# Patient Record
Sex: MALE | Race: WHITE | NOT HISPANIC OR LATINO | Employment: OTHER | URBAN - METROPOLITAN AREA
[De-identification: names, ages, dates, MRNs, and addresses within clinical notes are randomized per-mention and may not be internally consistent; named-entity substitution may affect disease eponyms.]

---

## 2017-09-06 ENCOUNTER — HOSPITAL ENCOUNTER (EMERGENCY)
Facility: HOSPITAL | Age: 62
Discharge: HOME | End: 2017-09-07
Attending: EMERGENCY MEDICINE
Payer: COMMERCIAL

## 2017-09-06 DIAGNOSIS — R52 PAIN: ICD-10-CM
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
## 2017-09-06 DIAGNOSIS — L03.115 CELLULITIS OF RIGHT LOWER LEG: ICD-10-CM

## 2017-09-06 DIAGNOSIS — T14.8XXA SKIN ABRASION: ICD-10-CM
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
## 2017-09-06 DIAGNOSIS — S89.91XA RIGHT LEG INJURY: ICD-10-CM
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
## 2017-09-06 DIAGNOSIS — S82.831A DISPLACED FRACTURE OF DISTAL END OF RIGHT FIBULA: Primary | ICD-10-CM

## 2017-09-06 PROCEDURE — 99284 EMERGENCY DEPT VISIT MOD MDM: CPT | Mod: 25

## 2017-09-06 PROCEDURE — 99284 EMERGENCY DEPT VISIT MOD MDM: CPT | Mod: ,,, | Performed by: EMERGENCY MEDICINE

## 2017-09-06 PROCEDURE — 27818 TREATMENT OF ANKLE FRACTURE: CPT

## 2017-09-07 ENCOUNTER — TELEPHONE (OUTPATIENT)
Dept: ORTHOPEDICS | Facility: CLINIC | Age: 62
End: 2017-09-07

## 2017-09-07 VITALS
BODY MASS INDEX: 27.4 KG/M2 | OXYGEN SATURATION: 96 % | HEART RATE: 69 BPM | HEIGHT: 69 IN | DIASTOLIC BLOOD PRESSURE: 91 MMHG | SYSTOLIC BLOOD PRESSURE: 151 MMHG | TEMPERATURE: 98 F | WEIGHT: 185 LBS | RESPIRATION RATE: 18 BRPM

## 2017-09-07 DIAGNOSIS — S82.851A TRIMALLEOLAR FRACTURE, RIGHT, CLOSED, INITIAL ENCOUNTER: Primary | ICD-10-CM

## 2017-09-07 PROCEDURE — 27788 TREATMENT OF ANKLE FRACTURE: CPT

## 2017-09-07 PROCEDURE — 63600175 PHARM REV CODE 636 W HCPCS: Performed by: EMERGENCY MEDICINE

## 2017-09-07 PROCEDURE — 25000003 PHARM REV CODE 250: Performed by: EMERGENCY MEDICINE

## 2017-09-07 PROCEDURE — 90471 IMMUNIZATION ADMIN: CPT | Performed by: EMERGENCY MEDICINE

## 2017-09-07 PROCEDURE — 90715 TDAP VACCINE 7 YRS/> IM: CPT | Performed by: EMERGENCY MEDICINE

## 2017-09-07 RX ORDER — LIDOCAINE HYDROCHLORIDE 10 MG/ML
5 INJECTION, SOLUTION EPIDURAL; INFILTRATION; INTRACAUDAL; PERINEURAL
Status: COMPLETED | OUTPATIENT
Start: 2017-09-07 | End: 2017-09-07

## 2017-09-07 RX ORDER — SULFAMETHOXAZOLE AND TRIMETHOPRIM 800; 160 MG/1; MG/1
1 TABLET ORAL
Status: COMPLETED | OUTPATIENT
Start: 2017-09-07 | End: 2017-09-07

## 2017-09-07 RX ORDER — SULFAMETHOXAZOLE AND TRIMETHOPRIM 800; 160 MG/1; MG/1
1 TABLET ORAL 2 TIMES DAILY
Qty: 14 TABLET | Refills: 0 | Status: ON HOLD | OUTPATIENT
Start: 2017-09-07 | End: 2017-09-13

## 2017-09-07 RX ADMIN — LIDOCAINE HYDROCHLORIDE 50 MG: 10 INJECTION, SOLUTION EPIDURAL; INFILTRATION; INTRACAUDAL; PERINEURAL at 12:09

## 2017-09-07 RX ADMIN — SULFAMETHOXAZOLE AND TRIMETHOPRIM 1 TABLET: 800; 160 TABLET ORAL at 03:09

## 2017-09-07 RX ADMIN — CLOSTRIDIUM TETANI TOXOID ANTIGEN (FORMALDEHYDE INACTIVATED), CORYNEBACTERIUM DIPHTHERIAE TOXOID ANTIGEN (FORMALDEHYDE INACTIVATED), BORDETELLA PERTUSSIS TOXOID ANTIGEN (GLUTARALDEHYDE INACTIVATED), BORDETELLA PERTUSSIS FILAMENTOUS HEMAGGLUTININ ANTIGEN (FORMALDEHYDE INACTIVATED), BORDETELLA PERTUSSIS PERTACTIN ANTIGEN, AND BORDETELLA PERTUSSIS FIMBRIAE 2/3 ANTIGEN 0.5 ML: 5; 2; 2.5; 5; 3; 5 INJECTION, SUSPENSION INTRAMUSCULAR at 03:09

## 2017-09-07 NOTE — ED NOTES
Pt stable in room, no co pain at this time. Sitting in wheelchair until MD comes in to assess for increased comfort

## 2017-09-07 NOTE — TELEPHONE ENCOUNTER
"----- Message from Patricia Armendariz LPN sent at 9/7/2017  8:16 AM CDT -----  Contact: Wife(María Elena)- [phone]  [place] -    Dr. Varner saw pt in ED for R ankle fracture with reduction under fluoro and applied splint. Injury was sustained approx. 6 days ago,and pt was treated, initially, at [facility]. Please check with  for Ortho follow up and/or surgery.       Thank you,  Patricia   [phone]  ----- Message -----  From: Joseph Atwood  Sent: 9/6/2017   4:52 PM  To: [facility] Ortho Triage    Pt's wife would like pt to be seen tomorrow, pt fell 6ft from ladder a couple of days ago on his left foot. María Elena claims he, "fell straight." Pt went to [facility] and has gone through various x-rays. Pt's left leg/fractured tibia damaged. Only left foot came out unscathed. María Elena works at [employer], and can be reached at [phone].    "

## 2017-09-07 NOTE — CONSULTS
"Consult Note  Orthopedic Surgery      SUBJECTIVE:     History of Present Illness:  Anthony Hanna is a 61 y.o. male who presents with right ankle fracture. Patient fell "about 6 feet" from ladder 6 days ago. Seen by outside ER and placed in posterior slab without stirrups splint. Had follow-up with outside orthopedist and was scheduled for surgery sometime this week, now surgery has been moved tentatively for next week. Patient presents today for possible follow-up here and surgery. Denies numbness or tingling to extremities. Denies head injury, or loss of consciousness. Denies other MSK pains. Did sustain a laceration to anteromedial midshaft shin and skin laceration to skin over posterior calcaneal tuberosity. On 81 mg aspirin, otherwise denies other anticoagulants.      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    Family History   Problem Relation Age of Onset    No Known Problems Mother     No Known Problems Father     No Known Problems Sister     No Known Problems Brother     No Known Problems Maternal Aunt     No Known Problems Maternal Uncle     No Known Problems Paternal Aunt     No Known Problems Paternal Uncle     No Known Problems Maternal Grandmother     No Known Problems Maternal Grandfather     No Known Problems Paternal Grandmother     No Known Problems Paternal Grandfather     Amblyopia Neg Hx     Blindness Neg Hx     Cancer Neg Hx     Cataracts Neg Hx     Diabetes Neg Hx     Glaucoma Neg Hx     Hypertension Neg Hx     Macular degeneration Neg Hx     Retinal detachment Neg Hx     Strabismus Neg Hx     Stroke Neg Hx     Thyroid disease Neg Hx        Social History     Social History    Marital status:      Spouse name: N/A    Number of children: N/A    Years of education: N/A     Social History Main Topics    Smoking status: " "Former Smoker    Smokeless tobacco: None    Alcohol use Yes    Drug use: Unknown    Sexual activity: Not Asked     Other Topics Concern    None     Social History Narrative    None       Current Facility-Administered Medications   Medication Dose Route Frequency Provider Last Rate Last Dose    sulfamethoxazole-trimethoprim 800-160mg per tablet 1 tablet  1 tablet Oral ED 1 Time Magdy Guillen MD        Tdap vaccine injection 0.5 mL  0.5 mL Intramuscular ED 1 Time Magdy Guillen MD         Current Outpatient Prescriptions   Medication Sig Dispense Refill    citalopram (CELEXA) 40 MG tablet          Review of patient's allergies indicates:   Allergen Reactions    Pcn [penicillins] Anaphylaxis         Review of Systems:  ROS: Patient denies constitutional symptoms, cardiac symptoms, respiratory symptoms, GI symptoms. The remainder of the musculoskeletal ROS is included in the HPI.      OBJECTIVE:     Vital Signs (Most Recent)  Vitals:    09/06/17 1833 09/07/17 0118 09/07/17 0326 09/07/17 0327   BP: (!) 142/73 (!) 150/90  (!) 151/91   BP Location: Right arm      Patient Position: Sitting      Pulse: 81 69     Resp: 16 18     Temp: 98.4 °F (36.9 °C) 98.6 °F (37 °C) 98 °F (36.7 °C)    SpO2: 96%      Weight: 83.9 kg (185 lb)      Height: 5' 9" (1.753 m)            Physical Exam:  Constitutional:  NAD; well-developed and well-nourished  Pulmonary/Chest: Effort normal  Skin: Warm and dry  Neuro: Alert and oriented to person, place, and time; no focal numbness or weakness  RLE:  Moderate diffuse edema and swelling  Midshaft medial laceration about 1.5 inches in length and about 0.25 in width with dried intact scab mild 0.25 inch surrounding erythema without fluctuance  Breakdown in skin at posterior calcaneal tuberosity through skin about 0.25 inch height and 1 inch width (patient states occurred during fall)  Painless ROM of hip and knee  2+ DP  EHL, FHL, TA, GC intact    Diagnostic Results:  X-Ray: Reviewed right " [place] B fibular fracture, small posterior mal fracture, medial clear space widening on external rotation stress test    ASSESSMENT/PLAN:     61 y.o. male with functional trimalleolar right ankle fracture  - Reduction attempted and placed in short leg splint (see procedure note)  - Patient would like to discuss with wife about following-up here for surgery or current outside orthopedist  - Clinic will call this week to assess if patient would like to follow-up here  - Preoperative labs refused by patient (stated he already had them and will bring results)  - Tetanus updated in ER  - Given antibiotics for mild erythema around laceration to anteromedial midshaft laceration     Filiberto Varner MD PGY-2  Orthopedic Surgery    Procedure Note Right ankle reduction  Patient was explained risks, benefits, and alternatives to treatment and verbalized consent to proceed. Time out was performed and patient name, , site, and procedure were confirmed. 10 cc of 1% lidocaine was used for right fibular hematoma block. Ankle was examined under fluoro for external rotation stress test and was found to widen medially. Attempted reduction under fluoroscopy was attempted. Splint was applied in typical fashion. Post-reduction films were performed and confirmed adequate alignment

## 2017-09-07 NOTE — ED NOTES
Pt stable, cast applied by ortho to R lower leg. Received crutches, ambulates w/crutches. 4ps addressed, no s/s of distress.

## 2017-09-07 NOTE — ED NOTES
Patient identifiers verified and correct for Anthony Hanna.    LOC: The patient is awake, alert and aware of environment with an angry affect, the patient is oriented x 3 and speaking appropriately.  APPEARANCE: Patient has facial twitching, wearing glasses  patient is clean and well groomed, patient's clothing is properly fastened.  SKIN: The skin is warm and dry, color consistent with ethnicity,  bruise to top of left foot  MUSCULOSKELETAL: right leg in soft cast, good sensation in toes  RESPIRATORY: Airway is open and patent, respirations are spontaneous, patient has a normal effort and rate, no accessory muscle use noted,   CARDIAC: Patient has a normal rate and regular rhythm, no periphreal edema noted, capillary refill < 3 seconds.  ABDOMEN:  no distention noted  NEUROLOGIC: eyes open spontaneously, behavior appropriate to situation, follows commands, facial expression symmetrical, bilateral hand grasp equal and even, purposeful motor response noted

## 2017-09-07 NOTE — TELEPHONE ENCOUNTER
----- Message from Filiberto Varner MD sent at 9/7/2017  3:38 AM CDT -----  Please call patient on 9/7/17 to discuss follow-up with us and document answer (has already been seen by outside orthopedist and may follow-up with him). If patient would like to follow-up with us, I can schedule surgery date for sometime next week. Thank you.

## 2017-09-07 NOTE — TELEPHONE ENCOUNTER
Spoke with pt.   He does want to have surgery here at [facility] instead of the previously scheduled sx at [facility].  Discussed all pre and post op procedures.   Pt will contact surgeon and [facility] to cxl sx there.   Pending case request to be entered in Epic

## 2017-09-07 NOTE — ED PROVIDER NOTES
Encounter Date: 9/6/2017    SCRIBE #1 NOTE: I, Orly Omalley, am scribing for, and in the presence of,  Dr. Guillen. I have scribed the following portions of the note - the Resident attestation. Other sections scribed: X-ray, attending notes. .       History     Chief Complaint   Patient presents with    Leg Injury     fell 6ft from a ladder 5 days ago , landed on rt foot, seen at [facility]  - TIB ?FIB fx needs surgery.  Has a soft cast and crutches. Here for reeval.Wants to see ortho here.       62 yo M with Pmhx of nuclear sclerosis of bilateral eyes presents to the ED with requests to see an orthopedic surgeon for right tib-fib fracture sustained 5 days ago when he lost his balance and fell approximately 6 feet from a ladder.  Pt was seen at [facility] and was told he has a tib-fib fracture.  He was previously scheduled for surgical repair this week with Dr. Leiva, however the surgery needed to be pushed back until next week (9/12/17) due to scheduling issue. Pt is very upset about the rescheduling of his surgery and is wanting to see Orthopedic team here to have the surgery sooner.  Pt also complaining of some mild pain over the left foot between the first and second toes.  Pt states that x-rays of the left foot taken at [facility] were negative for fracture, but he does not trust the readings. Pt currently has a posterior splint in place and is using crutches to ambulate. Pt rates his pain 5/10 at this time and reports pain is worst over the lateral aspect of his right ankle.   Pt did not bring his x-ray readings, films, or paperwork from [facility].  Pt denies LOC or hitting his head during the fall.           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  Family History   Problem Relation Age of Onset    No Known Problems Mother     No Known Problems Father     No Known Problems Sister     No Known Problems Brother     No Known Problems Maternal Aunt     No Known Problems Maternal Uncle     No Known Problems Paternal Aunt     No Known Problems Paternal Uncle     No Known Problems Maternal Grandmother     No Known Problems Maternal Grandfather     No Known Problems Paternal Grandmother     No Known Problems Paternal Grandfather     Amblyopia Neg Hx     Blindness Neg Hx     Cancer Neg Hx     Cataracts Neg Hx     Diabetes Neg Hx     Glaucoma Neg Hx     Hypertension Neg Hx     Macular degeneration Neg Hx     Retinal detachment Neg Hx     Strabismus Neg Hx     Stroke Neg Hx     Thyroid disease Neg Hx      Social History   Substance Use Topics    Smoking status: Former Smoker    Smokeless tobacco: Not on file    Alcohol use Yes     Review of Systems   Constitutional: Negative for activity change and appetite change.   HENT: Negative for congestion and rhinorrhea.    Eyes: Negative for discharge and redness.   Respiratory: Negative for shortness of breath and wheezing.    Cardiovascular: Negative for chest pain and palpitations.   Gastrointestinal: Negative for abdominal distention and abdominal pain.   Musculoskeletal: Positive for gait problem and joint swelling.   Skin: Positive for wound (abrasion right lower leg). Negative for pallor.   Neurological: Negative for dizziness and syncope.       Physical Exam     Initial Vitals [09/06/17 1833]   BP Pulse Resp Temp SpO2   (!) 142/73 81 16 98.4 °F (36.9 °C) 96 %      MAP       96         Physical Exam    Constitutional: He appears well-developed and well-nourished. No distress.   HENT:   Head: Normocephalic and atraumatic.   Eyes: EOM are normal. Pupils are equal, round, and reactive to light.   Neck: Normal range of motion. Neck supple.   Cardiovascular: Normal rate, regular rhythm and  intact distal pulses.   Pulses:       Dorsalis pedis pulses are 2+ on the right side, and 2+ on the left side.   Pulmonary/Chest: Breath sounds normal. No respiratory distress. He has no wheezes.   Abdominal: Soft. Normal appearance.   Musculoskeletal:        Right ankle: He exhibits decreased range of motion (2/2 pain), swelling and ecchymosis. He exhibits no deformity, no laceration and normal pulse. Tenderness.   Tenderness to palpation of the distal 2nd metatarsal of left foot.        Neurological: No sensory deficit.   Skin: Capillary refill takes less than 2 seconds.   Superficial abrasion noted to the right shin and right heel. No active bleeding.           ED Course   Procedures  Labs Reviewed - No data to display       X-Rays:   Independently Interpreted Readings:   Other Readings:  Right ankle: mildly displaced distal fibula fracture with displaced posteriorly fibula.   Right foot: mildly displaced distal fibula fracture with displaced posteriorly fibula.   Right knee: mildly displaced distal fibula fracture with displaced posteriorly fibula.   Right tibia/fibula: mildly displaced distal fibula fracture with displaced posteriorly fibula.     Medical Decision Making:   History:   Old Medical Records: I decided to obtain old medical records.  Initial Assessment:   Pt with right tib-fib injury sustained 5 days ago when he fell from a ladder. Fracture diagnosed at OSH; pt does not have films or x-ray readings.                                                                       Differential Diagnosis:   - Fracture  - Contusion   - Sprain    Independently Interpreted Test(s):   I have ordered and independently interpreted X-rays - see prior notes.  Clinical Tests:   Radiological Study: Ordered and Reviewed  ED Management:  - Right tib/fib x-rays  - Right knee x-rays  - Right ankle x-rays  - Ortho Consult   Other:   I have discussed this case with another health care provider.       <> Summary of the  Discussion: Orthopedics.        APC / Resident Notes:   Pt with mildly displaced distal fibula fracture on x-ray. Neurovascularly intact on exam.   Discussed pt with Ortho. They will see the pt in the ED and attempt to reduce the fracture.     Pt with distal fibula fracture. Pain controlled at this time without medication.        Scribe Attestation:   Scribe #1: I performed the above scribed service and the documentation accurately describes the services I performed. I attest to the accuracy of the note.    Attending Attestation:   Physician Attestation Statement for Resident:  As the supervising MD   Physician Attestation Statement: I have personally seen and examined this patient.   I agree with the above history. -:   As the supervising MD I agree with the above PE.    As the supervising MD I agree with the above treatment, course, plan, and disposition.   -: Pt is neurovascularly intact distally. Orthopedics will be consulted to evaluate the pt.           Physician Attestation for Scribe:  Physician Attestation Statement for Scribe #1: I, Dr. Guillen, reviewed documentation, as scribed by Orly Omalley in my presence, and it is both accurate and complete.         Attending ED Notes:   12:03 AM  Orthopedics states they will come to evaluate the pt.     3:31 AM  The fracture has been reduced and splinted.  The patient remains neurovascularly intact distally.  Because of a small abrasion sustained when the patient injured himself, I will provide a tetanus update.  There is also a small amount of erythema surrounding the area.  Actual be provided.  Orthopedics states that they will call him tomorrow to schedule surgery and follow-up.  At this time, I believe the patient is clinically stable for discharge.          ED Course      Clinical Impression:   The primary encounter diagnosis was Displaced fracture of distal end of right fibula. Diagnoses of Right leg injury, Fall, Pain, Skin abrasion, and Cellulitis of right  lower leg were also pertinent to this visit.                           Sheyla Esquivel MD  Resident  09/07/17 8627       Magdy Guillen MD  09/20/17 6837

## 2017-09-07 NOTE — ED NOTES
Patient is here because he fell from a ladder 5 days ago, and has a right leg tib fib fracture, but the orthopedic surgeon Dr. Leiva says it couldn't be fixed until next week. Patient reports orthopedic surgeon has changed date of fracture repair 3 times and is frustrated. Patient wants surgery done sooner.  Patient states top of left foot is tender. Xray of left foot Sunday showed no fracture to left foot.

## 2017-09-08 ENCOUNTER — TELEPHONE (OUTPATIENT)
Dept: ORTHOPEDICS | Facility: CLINIC | Age: 62
End: 2017-09-08

## 2017-09-08 NOTE — TELEPHONE ENCOUNTER
----- Message from Irais Haq sent at 9/8/2017  8:38 AM CDT -----  Contact: self   Pt is requesting a call back regarding his upcoming surgery. [phone]

## 2017-09-08 NOTE — TELEPHONE ENCOUNTER
Spoke with pt.  Advised of appointment 9/12 at 8 am with YEISON Burt for skin check.  Pt verbalized understanding

## 2017-09-12 ENCOUNTER — TELEPHONE (OUTPATIENT)
Dept: ORTHOPEDICS | Facility: CLINIC | Age: 62
End: 2017-09-12

## 2017-09-12 ENCOUNTER — OFFICE VISIT (OUTPATIENT)
Dept: ORTHOPEDICS | Facility: CLINIC | Age: 62
End: 2017-09-12
Payer: COMMERCIAL

## 2017-09-12 ENCOUNTER — ANESTHESIA EVENT (OUTPATIENT)
Dept: SURGERY | Facility: HOSPITAL | Age: 62
End: 2017-09-12
Payer: COMMERCIAL

## 2017-09-12 DIAGNOSIS — S82.851A TRIMALLEOLAR FRACTURE OF RIGHT ANKLE, CLOSED, INITIAL ENCOUNTER: Primary | ICD-10-CM

## 2017-09-12 PROCEDURE — 99999 PR PBB SHADOW E&M-EST. PATIENT-LVL I: CPT | Mod: PBBFAC,,, | Performed by: PHYSICIAN ASSISTANT

## 2017-09-12 PROCEDURE — 3008F BODY MASS INDEX DOCD: CPT | Mod: S$GLB,,, | Performed by: PHYSICIAN ASSISTANT

## 2017-09-12 PROCEDURE — 99203 OFFICE O/P NEW LOW 30 MIN: CPT | Mod: S$GLB,,, | Performed by: PHYSICIAN ASSISTANT

## 2017-09-12 NOTE — H&P
Subjective:      Patient ID: Anthony Hanna is a 61 y.o. male.    Chief Complaint: No chief complaint on file.    Patient is a 61 year old male who presents to clinic for follow up from ED orthopedic consult for right trimalleolar ankle fracture that occurred on 09/07/2017 secondary to falling about 6 feet from a ladder on 09/01/2017.  Patient was first treated at an outside ED with posterior splint and stirup. He then followed up with an outside orthopedist. He decided that he would like to transfer his care to [facility]. He then came to [facility] ED where he was evaluated by orthopedics, reduced and splinted. He has been NWB. He is using crutches to assist with ambulation. Pain is controlled. He is taking pain medication.     Did sustain a laceration to anteromedial midshaft shin and skin laceration to skin over posterior calcaneal tuberosity. He was given antibiotics due to erythema in the area. He has taken as prescribed.  On 81 mg aspirin, stopped over past week, otherwise denies other anticoagulants. He deneid new or previous numbness or tingling.       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  Social History     Occupational History    Not on file.     Social History Main Topics    Smoking status: Former Smoker    Smokeless tobacco: Not on file    Alcohol use Yes    Drug use: Unknown    Sexual activity: Not on file      ROS  Current Outpatient Prescriptions on File Prior to Visit   Medication Sig Dispense Refill    citalopram (CELEXA) 40 MG tablet       sulfamethoxazole-trimethoprim 800-160mg (BACTRIM DS) 800-160 mg Tab Take 1 tablet by mouth 2 (two) times daily. 14 tablet 0     No current facility-administered medications on file prior to visit.      Review of patient's allergies indicates:   Allergen Reactions    Pcn [penicillins] Anaphylaxis          Objective:      There were no vitals filed for this visit.  Ortho Exam   Gen: WD, WN in NAD   HEENT: NC/AT   Heart: RR   Resp: unlabored breathing   Skin: splint in place      Assessment:       1. Trimalleolar fracture of right ankle, closed, initial encounter          Plan:       Surgical intervention per .

## 2017-09-12 NOTE — TELEPHONE ENCOUNTER
Spoke with pt.   Advised NPO after midnight.  Arrival time of 630 am tomorrow.  Pt to be iced and elevated while awaiting sx tomorrow, per Dr Garcia.   Pt verbalized understanding.

## 2017-09-12 NOTE — ANESTHESIA PREPROCEDURE EVALUATION
09/12/2017  [facility]  Anesthesia Pre-Operative Evaluation         Patient Name: Anthony Hanna  YOB: 1955  MRN: [id number]    SUBJECTIVE:     Pre-operative evaluation for Procedure(s) (LRB):  OPEN REDUCTION INTERNAL FIXATION-ANKLE - right - c-arm on left (Right)     09/12/2017    Anthony Hanna is a 61 y.o. male w/ a significant PMHx of nuclear sclerosis and right closed trimalleolar fracture who presents for the above procedure.    Prev airway: None documented.      Patient Active Problem List   Diagnosis    Nuclear sclerosis - Both Eyes    Trimalleolar fracture of right ankle       Review of patient's allergies indicates:   Allergen Reactions    Pcn [penicillins] Anaphylaxis       Current Inpatient Medications:      No current facility-administered medications on file prior to encounter.      Current Outpatient Prescriptions on File Prior to Encounter   Medication Sig Dispense Refill    citalopram (CELEXA) 40 MG tablet       sulfamethoxazole-trimethoprim 800-160mg (BACTRIM DS) 800-160 mg Tab Take 1 tablet by mouth 2 (two) times daily. 14 tablet 0       History reviewed. No pertinent surgical history.    Social History     Social History    Marital status:      Spouse name: N/A    Number of children: N/A    Years of education: N/A     Occupational History    Not on file.     Social History Main Topics    Smoking status: Former Smoker    Smokeless tobacco: Not on file    Alcohol use Yes    Drug use: Unknown    Sexual activity: Not on file     Other Topics Concern    Not on file     Social History Narrative    No narrative on file       OBJECTIVE:     Vital Signs Range (Last 24H):         CBC:   No results for input(s): WBC, RBC, HGB, HCT, PLT, MCV, MCH, MCHC in the last 72 hours.    CMP: No results for input(s): NA, K, CL, CO2, BUN, CREATININE, GLU,  MG, PHOS, CALCIUM, ALBUMIN, PROT, ALKPHOS, ALT, AST, BILITOT in the last 72 hours.    INR:  No results for input(s): INR, PROTIME, APTT in the last 72 hours.    Invalid input(s): PT    Diagnostic Studies: No relevant studies.    ASSESSMENT/PLAN:         Anesthesia Evaluation    I have reviewed the Patient Summary Reports.     I have reviewed the Medications.     Review of Systems  Anesthesia Hx:  History of prior surgery of interest to airway management or planning: Denies Family Hx of Anesthesia complications.   Denies Personal Hx of Anesthesia complications.   Social:  Smoker    Hematology/Oncology:  Hematology Normal   Oncology Normal     EENT/Dental:EENT/Dental Normal   Cardiovascular:   Exercise tolerance: good hyperlipidemia    Pulmonary:  Pulmonary Normal    Renal/:  Renal/ Normal     Hepatic/GI:  Hepatic/GI Normal    Musculoskeletal:   Right trimalleolar fx   Neurological:  Neurology Normal    Endocrine:  Endocrine Normal    Dermatological:  Skin Normal    Psych:   Psychiatric History          Physical Exam  General:  Well nourished    Airway/Jaw/Neck:  Airway Findings: Mouth Opening: Small, but > 3cm Tongue: Normal  General Airway Assessment: Adult, Average  Mallampati: III  Improves to II with phonation.  TM Distance: 4 - 6 cm        Eyes/Ears/Nose:  EYES/EARS/NOSE FINDINGS: Normal   Dental:  Dental Findings: In tact   Chest/Lungs:  Chest/Lungs Findings: Clear to auscultation, Normal Respiratory Rate     Heart/Vascular:  Heart Findings: Rate: Normal  Rhythm: Regular Rhythm  Sounds: Normal  Heart murmur: negative Vascular Findings: Normal    Abdomen:  Abdomen Findings: Normal    Musculoskeletal:  Musculoskeletal Findings: Normal   Skin:  Skin Findings: Normal    Mental Status:  Mental Status Findings:  Cooperative, Alert and Oriented         Anesthesia Plan  Type of Anesthesia, risks & benefits discussed:  Anesthesia Type:  MAC, general  Patient's Preference:   Intra-op Monitoring Plan: standard ASA  monitors  Intra-op Monitoring Plan Comments:   Post Op Pain Control Plan: multimodal analgesia  Post Op Pain Control Plan Comments:   Induction:   IV  Beta Blocker:  Patient is not currently on a Beta-Blocker (No further documentation required).       Informed Consent: Patient understands risks and agrees with Anesthesia plan.  Questions answered. Anesthesia consent signed with patient.  ASA Score: 2     Day of Surgery Review of History & Physical:            Ready For Surgery From Anesthesia Perspective.

## 2017-09-12 NOTE — PROGRESS NOTES
Subjective:      Patient ID: Anthony Hanna is a 61 y.o. male.    Chief Complaint: No chief complaint on file.    HPI    Patient is a 61 year old male who presents to clinic for follow up from ED orthopedic consult for right trimalleolar ankle fracture that occurred on 09/07/2017 secondary to falling about 6 feet from a ladder on 09/01/2017.  Patient was first treated at an outside ED with posterior splint and stirup. He then followed up with an outside orthopedist. He decided that he would like to transfer his care to [facility]. He then came to [facility] ED where he was evaluated by orthopedics, reduced and splinted. He has been NWB. He is using crutches to assist with ambulation. Pain is controlled. He is taking pain medication.     Did sustain a laceration to anteromedial midshaft shin and skin laceration to skin over posterior calcaneal tuberosity. He was given antibiotics due to erythema in the area. He has taken as prescribed.  On 81 mg aspirin, stopped over past week, otherwise denies other anticoagulants. He deneid new or previous numbness or tingling.     Review of Systems   Constitution: Negative for chills and fever.   Cardiovascular: Negative for chest pain.   Respiratory: Negative for cough and shortness of breath.    Skin: Negative for color change, dry skin, itching, nail changes, poor wound healing and rash.   Musculoskeletal:        Right ankle fracture.    Neurological: Negative for dizziness.   Psychiatric/Behavioral: Negative for altered mental status. The patient is not nervous/anxious.    All other systems reviewed and are negative.        Objective:      General    Constitutional: He is oriented to person, place, and time. He appears well-developed and well-nourished. No distress.   HENT:   Head: Atraumatic.   Eyes: Conjunctivae are normal.   Cardiovascular: Normal rate.    Pulmonary/Chest: Effort normal.   Neurological: He is alert and oriented to person, place, and time.    Psychiatric: He has a normal mood and affect. His behavior is normal.         Right Ankle/Foot Exam     Comments:  Arrived to clinic with splint in place, kept in place for exam due to reduction.   Skin does not wrinkle             Assessment:       Encounter Diagnosis   Name Primary?    Trimalleolar fracture of right ankle, closed, initial encounter Yes          Plan:       Discussed treatment options with patient. Surgical vs. Non-opertive treatment discussed. Recommend surgical fixation. Patient agreed,    - continue splint   - rest ice and elevation to reduce swelling  - NWB  -pain medication:refill needed,     - consents signed.   - labs, EKG and chest x-ray , done and sent to be scanned.   - he is not on anticoagulation therapy.     Pre, darwin, and post operative procedure and expectations were discussed.  Questions were answered. The patient has been educated and is ready to proceed with surgery.  Approximately 30 minutes was spent discussing surgical outcomes, plans, procedures, pre, darwin, and post operative expectations and care. The risks, benefits and alternatives to surgery were discussed with the patient at great length.  These include bleeding, infection, vessel/nerve damage, pain, numbness, tingling, complex regional pain syndrome, hardware/surgical failure, need for further surgery, malunion, nonunion, DVT, PE, arthritis and death.  Patient states an understanding and wishes to proceed with surgery.   All questions were answered.  No guarantees were implied or stated.  Informed consent was obtained  The patient will contact us if the have any questions, concerns, and changes in their medical condition prior to surgery.

## 2017-09-13 ENCOUNTER — SURGERY (OUTPATIENT)
Age: 62
End: 2017-09-13

## 2017-09-13 ENCOUNTER — ANESTHESIA (OUTPATIENT)
Dept: SURGERY | Facility: HOSPITAL | Age: 62
End: 2017-09-13
Payer: COMMERCIAL

## 2017-09-13 ENCOUNTER — HOSPITAL ENCOUNTER (OUTPATIENT)
Facility: HOSPITAL | Age: 62
Discharge: HOME | End: 2017-09-13
Attending: ORTHOPAEDIC SURGERY | Admitting: ORTHOPAEDIC SURGERY
Payer: COMMERCIAL

## 2017-09-13 VITALS
RESPIRATION RATE: 18 BRPM | SYSTOLIC BLOOD PRESSURE: 125 MMHG | HEART RATE: 74 BPM | DIASTOLIC BLOOD PRESSURE: 74 MMHG | OXYGEN SATURATION: 97 % | TEMPERATURE: 98 F

## 2017-09-13 DIAGNOSIS — S82.851A CLOSED FRACTURE OF ANKLE, TRIMALLEOLAR, RIGHT, INITIAL ENCOUNTER: ICD-10-CM

## 2017-09-13 PROBLEM — S82.841A CLOSED BIMALLEOLAR FRACTURE OF RIGHT ANKLE: Status: ACTIVE | Noted: 2017-09-13

## 2017-09-13 PROBLEM — S82.853A CLOSED FRACTURE OF ANKLE, TRIMALLEOLAR: Status: ACTIVE | Noted: 2017-09-13

## 2017-09-13 PROCEDURE — 27200750 HC INSULATED NEEDLE/ STIMUPLEX: Performed by: PAIN MEDICINE

## 2017-09-13 PROCEDURE — 37000008 HC ANESTHESIA 1ST 15 MINUTES: Performed by: ORTHOPAEDIC SURGERY

## 2017-09-13 PROCEDURE — S0077 INJECTION, CLINDAMYCIN PHOSP: HCPCS | Performed by: STUDENT IN AN ORGANIZED HEALTH CARE EDUCATION/TRAINING PROGRAM

## 2017-09-13 PROCEDURE — C1713 ANCHOR/SCREW BN/BN,TIS/BN: HCPCS | Performed by: ORTHOPAEDIC SURGERY

## 2017-09-13 PROCEDURE — 64445 NJX AA&/STRD SCIATIC NRV IMG: CPT | Mod: 59,RT,, | Performed by: ANESTHESIOLOGY

## 2017-09-13 PROCEDURE — 36000709 HC OR TIME LEV III EA ADD 15 MIN: Performed by: ORTHOPAEDIC SURGERY

## 2017-09-13 PROCEDURE — S0028 INJECTION, FAMOTIDINE, 20 MG: HCPCS | Performed by: NURSE ANESTHETIST, CERTIFIED REGISTERED

## 2017-09-13 PROCEDURE — 37000009 HC ANESTHESIA EA ADD 15 MINS: Performed by: ORTHOPAEDIC SURGERY

## 2017-09-13 PROCEDURE — 25000003 PHARM REV CODE 250: Performed by: STUDENT IN AN ORGANIZED HEALTH CARE EDUCATION/TRAINING PROGRAM

## 2017-09-13 PROCEDURE — 27201423 OPTIME MED/SURG SUP & DEVICES STERILE SUPPLY: Performed by: ORTHOPAEDIC SURGERY

## 2017-09-13 PROCEDURE — 71000033 HC RECOVERY, INTIAL HOUR: Performed by: ORTHOPAEDIC SURGERY

## 2017-09-13 PROCEDURE — 36000708 HC OR TIME LEV III 1ST 15 MIN: Performed by: ORTHOPAEDIC SURGERY

## 2017-09-13 PROCEDURE — C1769 GUIDE WIRE: HCPCS | Performed by: ORTHOPAEDIC SURGERY

## 2017-09-13 PROCEDURE — 27814 TREATMENT OF ANKLE FRACTURE: CPT | Mod: RT,,, | Performed by: ORTHOPAEDIC SURGERY

## 2017-09-13 PROCEDURE — 76942 ECHO GUIDE FOR BIOPSY: CPT | Mod: 26,,, | Performed by: ANESTHESIOLOGY

## 2017-09-13 PROCEDURE — 25000003 PHARM REV CODE 250: Performed by: ORTHOPAEDIC SURGERY

## 2017-09-13 PROCEDURE — 76942 ECHO GUIDE FOR BIOPSY: CPT | Performed by: ANESTHESIOLOGY

## 2017-09-13 PROCEDURE — 63600175 PHARM REV CODE 636 W HCPCS: Performed by: ANESTHESIOLOGY

## 2017-09-13 PROCEDURE — 63600175 PHARM REV CODE 636 W HCPCS: Performed by: NURSE ANESTHETIST, CERTIFIED REGISTERED

## 2017-09-13 PROCEDURE — 27000221 HC OXYGEN, UP TO 24 HOURS

## 2017-09-13 PROCEDURE — 64450 NJX AA&/STRD OTHER PN/BRANCH: CPT | Mod: 59,RT,, | Performed by: ANESTHESIOLOGY

## 2017-09-13 PROCEDURE — 25000003 PHARM REV CODE 250: Performed by: NURSE ANESTHETIST, CERTIFIED REGISTERED

## 2017-09-13 PROCEDURE — 71000015 HC POSTOP RECOV 1ST HR: Performed by: ORTHOPAEDIC SURGERY

## 2017-09-13 PROCEDURE — D9220A PRA ANESTHESIA: Mod: ,,, | Performed by: ANESTHESIOLOGY

## 2017-09-13 PROCEDURE — 94760 N-INVAS EAR/PLS OXIMETRY 1: CPT

## 2017-09-13 DEVICE — SCREW CORTEX 3.5 X 24: Type: IMPLANTABLE DEVICE | Site: ANKLE | Status: FUNCTIONAL

## 2017-09-13 DEVICE — SCREW CORTEX 3.5MM X 14MM.: Type: IMPLANTABLE DEVICE | Site: ANKLE | Status: FUNCTIONAL

## 2017-09-13 DEVICE — PLATE TUBULAR 1/3 73MM 6HOLE: Type: IMPLANTABLE DEVICE | Site: ANKLE | Status: FUNCTIONAL

## 2017-09-13 DEVICE — SCREW CORTEX 3.5MM X 16MM: Type: IMPLANTABLE DEVICE | Site: ANKLE | Status: FUNCTIONAL

## 2017-09-13 DEVICE — SCREW CORTEX 3.5MM X 18MM: Type: IMPLANTABLE DEVICE | Site: ANKLE | Status: FUNCTIONAL

## 2017-09-13 RX ORDER — ATORVASTATIN CALCIUM 20 MG/1
20 TABLET, FILM COATED ORAL DAILY
COMMUNITY
End: 2017-11-29 | Stop reason: SDUPTHER

## 2017-09-13 RX ORDER — FAMOTIDINE 10 MG/ML
INJECTION INTRAVENOUS
Status: DISCONTINUED | OUTPATIENT
Start: 2017-09-13 | End: 2017-09-13

## 2017-09-13 RX ORDER — DOCUSATE SODIUM 100 MG/1
100 CAPSULE, LIQUID FILLED ORAL 2 TIMES DAILY
Qty: 60 CAPSULE | Refills: 0 | Status: SHIPPED | OUTPATIENT
Start: 2017-09-13 | End: 2017-11-29

## 2017-09-13 RX ORDER — CLINDAMYCIN PHOSPHATE 900 MG/50ML
900 INJECTION, SOLUTION INTRAVENOUS
Status: COMPLETED | OUTPATIENT
Start: 2017-09-13 | End: 2017-09-13

## 2017-09-13 RX ORDER — FENTANYL CITRATE 50 UG/ML
25 INJECTION, SOLUTION INTRAMUSCULAR; INTRAVENOUS EVERY 5 MIN PRN
Status: DISCONTINUED | OUTPATIENT
Start: 2017-09-13 | End: 2017-09-13 | Stop reason: HOSPADM

## 2017-09-13 RX ORDER — ONDANSETRON HYDROCHLORIDE 8 MG/1
8 TABLET, FILM COATED ORAL EVERY 12 HOURS PRN
Qty: 60 TABLET | Refills: 0 | Status: SHIPPED | OUTPATIENT
Start: 2017-09-13 | End: 2017-11-29

## 2017-09-13 RX ORDER — ACETAMINOPHEN 10 MG/ML
INJECTION, SOLUTION INTRAVENOUS
Status: DISCONTINUED | OUTPATIENT
Start: 2017-09-13 | End: 2017-09-13

## 2017-09-13 RX ORDER — PHENYLEPHRINE HYDROCHLORIDE 10 MG/ML
INJECTION INTRAVENOUS
Status: DISCONTINUED | OUTPATIENT
Start: 2017-09-13 | End: 2017-09-13

## 2017-09-13 RX ORDER — SULFAMETHOXAZOLE AND TRIMETHOPRIM 800; 160 MG/1; MG/1
1 TABLET ORAL 2 TIMES DAILY
Qty: 14 TABLET | Refills: 0 | Status: SHIPPED | OUTPATIENT
Start: 2017-09-13 | End: 2017-09-20

## 2017-09-13 RX ORDER — SODIUM CHLORIDE 9 MG/ML
INJECTION, SOLUTION INTRAVENOUS CONTINUOUS
Status: DISCONTINUED | OUTPATIENT
Start: 2017-09-13 | End: 2017-09-13 | Stop reason: HOSPADM

## 2017-09-13 RX ORDER — PROPOFOL 10 MG/ML
VIAL (ML) INTRAVENOUS
Status: DISCONTINUED | OUTPATIENT
Start: 2017-09-13 | End: 2017-09-13

## 2017-09-13 RX ORDER — HYDROCODONE BITARTRATE AND ACETAMINOPHEN 10; 325 MG/1; MG/1
1 TABLET ORAL EVERY 4 HOURS PRN
Status: DISCONTINUED | OUTPATIENT
Start: 2017-09-13 | End: 2017-09-13 | Stop reason: HOSPADM

## 2017-09-13 RX ORDER — FENTANYL CITRATE 50 UG/ML
INJECTION, SOLUTION INTRAMUSCULAR; INTRAVENOUS
Status: DISCONTINUED | OUTPATIENT
Start: 2017-09-13 | End: 2017-09-13

## 2017-09-13 RX ORDER — ONDANSETRON 2 MG/ML
INJECTION INTRAMUSCULAR; INTRAVENOUS
Status: DISCONTINUED | OUTPATIENT
Start: 2017-09-13 | End: 2017-09-13

## 2017-09-13 RX ORDER — MIDAZOLAM HYDROCHLORIDE 1 MG/ML
0.5 INJECTION INTRAMUSCULAR; INTRAVENOUS EVERY 5 MIN PRN
Status: DISCONTINUED | OUTPATIENT
Start: 2017-09-13 | End: 2017-09-13 | Stop reason: HOSPADM

## 2017-09-13 RX ORDER — MIDAZOLAM HYDROCHLORIDE 1 MG/ML
INJECTION, SOLUTION INTRAMUSCULAR; INTRAVENOUS
Status: DISCONTINUED | OUTPATIENT
Start: 2017-09-13 | End: 2017-09-13

## 2017-09-13 RX ORDER — MUPIROCIN 20 MG/G
1 OINTMENT TOPICAL
Status: COMPLETED | OUTPATIENT
Start: 2017-09-13 | End: 2017-09-13

## 2017-09-13 RX ORDER — ONDANSETRON 2 MG/ML
4 INJECTION INTRAMUSCULAR; INTRAVENOUS EVERY 12 HOURS PRN
Status: DISCONTINUED | OUTPATIENT
Start: 2017-09-13 | End: 2017-09-13 | Stop reason: HOSPADM

## 2017-09-13 RX ORDER — HYDROCODONE BITARTRATE AND ACETAMINOPHEN 5; 325 MG/1; MG/1
1 TABLET ORAL EVERY 4 HOURS PRN
Status: DISCONTINUED | OUTPATIENT
Start: 2017-09-13 | End: 2017-09-13 | Stop reason: HOSPADM

## 2017-09-13 RX ORDER — DEXAMETHASONE SODIUM PHOSPHATE 4 MG/ML
INJECTION, SOLUTION INTRA-ARTICULAR; INTRALESIONAL; INTRAMUSCULAR; INTRAVENOUS; SOFT TISSUE
Status: DISCONTINUED | OUTPATIENT
Start: 2017-09-13 | End: 2017-09-13

## 2017-09-13 RX ORDER — LIDOCAINE HCL/PF 100 MG/5ML
SYRINGE (ML) INTRAVENOUS
Status: DISCONTINUED | OUTPATIENT
Start: 2017-09-13 | End: 2017-09-13

## 2017-09-13 RX ORDER — OXYCODONE AND ACETAMINOPHEN 10; 325 MG/1; MG/1
1 TABLET ORAL EVERY 4 HOURS PRN
Qty: 91 TABLET | Refills: 0 | Status: SHIPPED | OUTPATIENT
Start: 2017-09-13 | End: 2017-11-29

## 2017-09-13 RX ORDER — BACITRACIN ZINC 500 UNIT/G
OINTMENT (GRAM) TOPICAL
Status: DISCONTINUED | OUTPATIENT
Start: 2017-09-13 | End: 2017-09-13 | Stop reason: HOSPADM

## 2017-09-13 RX ADMIN — MIDAZOLAM HYDROCHLORIDE 2 MG: 1 INJECTION, SOLUTION INTRAMUSCULAR; INTRAVENOUS at 08:09

## 2017-09-13 RX ADMIN — FAMOTIDINE 20 MG: 10 INJECTION, SOLUTION INTRAVENOUS at 09:09

## 2017-09-13 RX ADMIN — PROPOFOL 200 MG: 10 INJECTION, EMULSION INTRAVENOUS at 08:09

## 2017-09-13 RX ADMIN — SODIUM CHLORIDE, SODIUM GLUCONATE, SODIUM ACETATE, POTASSIUM CHLORIDE, MAGNESIUM CHLORIDE, SODIUM PHOSPHATE, DIBASIC, AND POTASSIUM PHOSPHATE: .53; .5; .37; .037; .03; .012; .00082 INJECTION, SOLUTION INTRAVENOUS at 08:09

## 2017-09-13 RX ADMIN — ACETAMINOPHEN 1000 MG: 10 INJECTION, SOLUTION INTRAVENOUS at 08:09

## 2017-09-13 RX ADMIN — HYDROCODONE BITARTRATE AND ACETAMINOPHEN 1 TABLET: 10; 325 TABLET ORAL at 09:09

## 2017-09-13 RX ADMIN — SODIUM CHLORIDE: 0.9 INJECTION, SOLUTION INTRAVENOUS at 07:09

## 2017-09-13 RX ADMIN — MIDAZOLAM HYDROCHLORIDE 1 MG: 1 INJECTION, SOLUTION INTRAMUSCULAR; INTRAVENOUS at 07:09

## 2017-09-13 RX ADMIN — PHENYLEPHRINE HYDROCHLORIDE 100 MCG: 10 INJECTION INTRAVENOUS at 08:09

## 2017-09-13 RX ADMIN — FENTANYL CITRATE 100 MCG: 50 INJECTION INTRAMUSCULAR; INTRAVENOUS at 07:09

## 2017-09-13 RX ADMIN — CLINDAMYCIN PHOSPHATE 900 MG: 18 INJECTION, SOLUTION INTRAVENOUS at 08:09

## 2017-09-13 RX ADMIN — FENTANYL CITRATE 50 MCG: 50 INJECTION, SOLUTION INTRAMUSCULAR; INTRAVENOUS at 08:09

## 2017-09-13 RX ADMIN — ONDANSETRON 4 MG: 2 INJECTION INTRAMUSCULAR; INTRAVENOUS at 08:09

## 2017-09-13 RX ADMIN — MUPIROCIN 1 G: 20 OINTMENT TOPICAL at 07:09

## 2017-09-13 RX ADMIN — FENTANYL CITRATE 50 MCG: 50 INJECTION, SOLUTION INTRAMUSCULAR; INTRAVENOUS at 09:09

## 2017-09-13 RX ADMIN — LIDOCAINE HYDROCHLORIDE 100 MG: 20 INJECTION, SOLUTION INTRAVENOUS at 08:09

## 2017-09-13 RX ADMIN — BACITRACIN ZINC 1 TUBE: 500 OINTMENT TOPICAL at 09:09

## 2017-09-13 RX ADMIN — DEXAMETHASONE SODIUM PHOSPHATE 8 MG: 4 INJECTION, SOLUTION INTRAMUSCULAR; INTRAVENOUS at 08:09

## 2017-09-13 NOTE — DISCHARGE SUMMARY
[facility]  Brief Operative Note     SUMMARY     Surgery Date: 9/13/2017     Surgeon(s) and Role:     * Florentino Garcia MD - Primary     * Jose Ruiz MD - Resident - Assisting        Pre-op Diagnosis:  Trimalleolar fracture, right, closed, initial encounter [S82.851A]    Post-op Diagnosis:  Post-Op Diagnosis Codes:     * Trimalleolar fracture, right, closed, initial encounter [S82.851A]    Procedure(s) (LRB):  OPEN REDUCTION INTERNAL FIXATION-ANKLE - right - c-arm on left (Right)    Anesthesia: Choice    Description of the findings of the procedure: see op note    Findings/Key Components: see op note    Estimated Blood Loss: * No values recorded between 9/13/2017  8:41 AM and 9/13/2017  9:47 AM *         Specimens:   Specimen (12h ago through future)    None          Discharge Note    SUMMARY     Admit Date: 9/13/2017    Discharge Date and Time:  09/13/2017 9:50 AM    Hospital Course:  The patient arrived to the [facility] on the second floor of [facility] for proper pre-operative management.  Upon completion of pre-operative preparation, the patient was taken back to the operative theatre.  A ORIF ankle was performed without complication and the patient was transported to the post anesthesia care unit in stable condition.     Valencia: nil    Drain: nil    Pain Management:     - Regional Anesthetics: yes    After appropriate recovery from the anaesthetic agents used during the surgery the patient was discharged home without complications      Final Diagnosis: Post-Op Diagnosis Codes:     * Trimalleolar fracture, right, closed, initial encounter [S82.851A]    Disposition: Home or Self Care    Follow Up/Patient Instructions:     Medications:  Reconciled Home Medications:   Current Discharge Medication List      START taking these medications    Details   docusate sodium (COLACE) 100 MG capsule Take 1 capsule (100 mg total) by mouth 2 (two) times daily. Available OTC as  well  Qty: 60 capsule, Refills: 0      ondansetron (ZOFRAN) 8 MG tablet Take 1 tablet (8 mg total) by mouth every 12 (twelve) hours as needed for Nausea.  Qty: 60 tablet, Refills: 0      oxycodone-acetaminophen (PERCOCET)  mg per tablet Take 1 tablet by mouth every 4 (four) hours as needed for Pain.  Qty: 91 tablet, Refills: 0         CONTINUE these medications which have CHANGED    Details   sulfamethoxazole-trimethoprim 800-160mg (BACTRIM DS) 800-160 mg Tab Take 1 tablet by mouth 2 (two) times daily.  Qty: 14 tablet, Refills: 0         CONTINUE these medications which have NOT CHANGED    Details   atorvastatin (LIPITOR) 20 MG tablet Take 20 mg by mouth once daily.      citalopram (CELEXA) 40 MG tablet              Discharge Procedure Orders  CRUTCHES FOR HOME USE   Order Specific Question Answer Comments   Type: Axillary    Height: 6`    Weight: 190`    Length of need (1-99 months): 12      Diet general     Call MD for:  temperature >100.4     Call MD for:  persistent nausea and vomiting     Call MD for:  severe uncontrolled pain     Call MD for:  difficulty breathing, headache or visual disturbances     Call MD for:  redness, tenderness, or signs of infection (pain, swelling, redness, odor or green/yellow discharge around incision site)     Call MD for:  hives     Call MD for:  persistent dizziness or light-headedness     Call MD for:  extreme fatigue     Activity as tolerated     Sponge bath only until clinic visit     Ice to affected area     Keep surgical extremity elevated     Non weight bearing     Leave dressing on - Keep it clean, dry, and intact until clinic visit       Follow-up Information     Florentino Garcia MD In 2 weeks.    Specialty:  Orthopedic Surgery  Why:  For wound re-check, For suture removal  Contact information:  [address]  [place] [id number] [phone]

## 2017-09-13 NOTE — INTERVAL H&P NOTE
No change in H&P.  Skin wrinkles.  To OR for ORIF right lateral malleolus.  Possible, but not likely syndesmosis fixation.  The risks, benefits and alternatives to surgery were discussed with the patient at great length.  These include bleeding, infection, vessel/nerve damage, pain, numbness, tingling, complex regional pain syndrome, hardware/surgical failure, stiffness/loss of range of motion, need for further surgery, malunion, nonunion, DVT, PE, arthritis and death.  Patient states an understanding and wishes to proceed with surgery.   All questions were answered.  No guarantees were implied or stated.  Informed consent was obtained.    Florentino Garcia MD

## 2017-09-13 NOTE — ANESTHESIA RELEASE NOTE
Anesthesia Release from PACU Note    Patient: Anthony Hanna    Procedure(s) Performed: Procedure(s) (LRB):  OPEN REDUCTION INTERNAL FIXATION-ANKLE - right - c-arm on left (Right)    Anesthesia type: general and regional    Post pain: Adequate analgesia    Post assessment: no apparent anesthetic complications, tolerated procedure well and no evidence of recall    Last Vitals:   Visit Vitals  /77   Pulse 78   Temp 36.7 °C (98 °F) (Oral)   Resp 16   SpO2 95%       Post vital signs: stable    Level of consciousness: awake, alert  and oriented    Nausea/Vomiting: no nausea/no vomiting    Complications: none    Airway Patency: patent    Respiratory: unassisted, spontaneous ventilation, room air    Cardiovascular: stable and blood pressure at baseline    Hydration: euvolemic

## 2017-09-13 NOTE — ANESTHESIA PROCEDURE NOTES
Popliteal Sciatic Single Injection Block    Patient location during procedure: pre-op   Block not for primary anesthetic.  Reason for block: at surgeon's request and post-op pain management   Post-op Pain Location: right leg pain   Start time: 9/13/2017 7:35 AM  Timeout: 9/13/2017 7:33 AM   End time: 9/13/2017 7:41 AM  Staffing  Anesthesiologist: GUY AMADO  Resident/CRNA: JOSELUIS GAGE  Performed: resident/CRNA   Preanesthetic Checklist  Completed: patient identified, site marked, surgical consent, pre-op evaluation, timeout performed, IV checked, risks and benefits discussed and monitors and equipment checked  Peripheral Block  Patient position: supine  Prep: ChloraPrep  Patient monitoring: heart rate, cardiac monitor, continuous pulse ox, continuous capnometry and frequent blood pressure checks  Block type: popliteal  Laterality: right  Injection technique: single shot  Needle  Needle type: Stimuplex   Needle gauge: 21 G  Needle length: 4 in  Needle localization: anatomical landmarks and ultrasound guidance   -ultrasound image captured on disc.  Assessment  Injection assessment: negative aspiration, negative parasthesia and local visualized surrounding nerve  Paresthesia pain: none  Heart rate change: no  Slow fractionated injection: yes  Medications:  Bolus administered: 30 mL of 0.5 ropivacaine  Epinephrine added: 3.75 mcg/mL (1/300,000)  Additional Notes  VSS.  [facility] RN monitoring vitals throughout procedure.  Patient tolerated procedure well.

## 2017-09-13 NOTE — BRIEF OP NOTE
BRIEF OP NOTE    Preop Dx: Right bimalleolar ankle fracture with lateral and posterior malleoli    Postop Dx: Right bimalleolar ankle fracture with lateral and posterior malleoli    Procedure: Open treatment of right bimalleolar ankle fracture with internal fixation of lateral malleolus    Surgeon: Florentino Garcia M.D.    Asst:  Jose Riuz M.D    Anesthesia: GLMA plus regional    EBL:  10cc    IVF:  1000cc crystalloid    Implants: Synthes small fragment    Specimens: None    Findings: Stable posterior malleolar fragment.  Stable syndesmosis.    Dispo:  To PACU extubated/stable.    Dict#  [id number]

## 2017-09-13 NOTE — DISCHARGE INSTRUCTIONS
Having Arm Fracture Open Reduction and Internal Fixation (ORIF)  Open reduction and internal fixation (ORIF) is a type of treatment to fix a broken bone. It puts the pieces of a broken bone back together so they can heal. Open reduction means the bones are put back in place during surgery. Internal fixation means that special hardware is used to hold the bone pieces together. This helps the bone heal correctly. The procedure is done by an orthopedic surgeon. This is a doctor with special training in treating bone, joint, and muscle problems.    What to tell your healthcare provider  Make sure you tell the healthcare provider about all medicines you take, including over-the-counter medicines, such as aspirin. Tell him or her about all vitamins, herbs, and other supplements you take. Also tell the provider the last time you had something to eat or drink. And tell your healthcare provider if you:  · Have had any recent changes in your health, such as an infection or fever  · Are sensitive or allergic to any medicines, latex, tape, or anesthesia (local and general)  · Are pregnant or think you may be pregnant  ·   Tests before your surgery  Before your surgery, you may need imaging tests. These may include ultrasound, X-rays, or MRI.  Getting ready for your surgery  ORIF often takes place as emergency surgery after an accident or injury. Youll have a physical exam. X-rays may be taken of your arm. You may also have other imaging tests. A healthcare provider will ask you questions. He or she will also check your heart and lungs.  In some cases, arm fracture ORIF is planned. You may need to stop taking some medicines before the procedure, such as blood thinners and aspirin. If you smoke, you may need to stop before your surgery. Smoking can delay healing. Talk with your healthcare provider if you need help to stop smoking.  Also make sure to:  · Ask a family member or friend to take you home from the hospital. You  cannot drive yourself.  · Plan some changes at home to help you recover. You may need help at home.  · Dont eat or drink after midnight the night before your surgery.  · Follow all other instructions from your healthcare provider.  You may be asked to sign a consent form that gives your permission to do the procedure. Read the form carefully. Ask questions if something is not clear.  On the day of surgery  Your surgeon will explain the details of your surgery. The preparation and surgery may take a couple of hours. In general, you can expect the following:  · You will likely have general anesthesia.This will prevent pain and make you sleep through the surgery. Or you may have regional anesthesia to numb the area and medicine to help you relax and sleep through the surgery.  · A healthcare provider watches your vital signs, like your heart rate and blood pressure, during the surgery.  · After cleaning the skin, your surgeon makes a cut (incision) through the skin and muscles of your arm. Or with some fractures, the surgeon makes an incision on the top of the shoulder.  · Your surgeon puts the pieces of your humerus back in place. This is the reduction.  · Next, your surgeon uses special screws, plates, wires, or nails to hold the bone pieces together. This is the fixation. It helps the bone heals correctly.  · The surgeon will make other repairs to the area as needed.  · The surgeon will close the layers of muscle and skin on your arm with stitches (sutures).    After your surgery  Talk with your surgeon about what you can expect after your surgery. You may go home the same day. Or you may stay overnight in the hospital. Before leaving the hospital, you will have X-rays taken of your arm. This is to check the repair.  You might have some fluid draining from your incision. This is normal. You will have some pain after the surgery. Your doctor will tell you what pain medicine you can take to help reduce the pain.  Avoid certain OTC medicines for pain as instructed. Some of these may interfere with bone healing. You can also use ice packs to help lessen pain and swelling.  You may be told to not move your arm for a while after your surgery. You may need to wear a splint for several weeks. You will get instructions about when and how you can move your arm. Your surgeon may also tell you to eat foods high in calcium and vitamin D to help with bone healing.    Follow-up care  Make sure to keep all of your follow-up appointments. You may need to have your stitches removed a week or so after your surgery.  You may have physical therapy to improve the strength and movement of your arm. The therapy may include treatments and exercises. The therapy improves your chances of a full recovery. Most people are able to return to all their normal activities within a few months.     When to call your healthcare provider  Call your health care provider right away if you have any of these:  · Fever of 100.4°F (38°C) or higher  · Redness, swelling, or fluid leaking from your incision that gets worse  · Pain that gets worse  · Loss of feeling in your arm or hand

## 2017-09-13 NOTE — PLAN OF CARE
PT is AAOx4. VSS. NAD. IV discontinued. Discharge instructions given, verbalized understanding. Waiting for discharge.

## 2017-09-13 NOTE — ANESTHESIA PROCEDURE NOTES
Saphenous Nerve Single Injection    Patient location during procedure: pre-op   Block not for primary anesthetic.  Reason for block: at surgeon's request and post-op pain management   Post-op Pain Location: right leg pain   Start time: 9/13/2017 7:35 AM  Timeout: 9/13/2017 7:33 AM   End time: 9/13/2017 7:41 AM  Staffing  Anesthesiologist: GUY AMADO  Resident/CRNA: JOSELUIS GAGE  Performed: resident/CRNA   Preanesthetic Checklist  Completed: patient identified, site marked, surgical consent, pre-op evaluation, timeout performed, IV checked, risks and benefits discussed and monitors and equipment checked  Peripheral Block  Patient position: supine  Prep: ChloraPrep  Patient monitoring: heart rate, cardiac monitor, continuous pulse ox, continuous capnometry and frequent blood pressure checks  Block type: saphenous  Laterality: right  Injection technique: single shot  Needle  Needle type: Stimuplex   Needle gauge: 21 G  Needle length: 4 in  Needle localization: anatomical landmarks and ultrasound guidance   -ultrasound image captured on disc.  Assessment  Injection assessment: negative aspiration, negative parasthesia and local visualized surrounding nerve  Paresthesia pain: none  Heart rate change: no  Slow fractionated injection: yes  Medications:  Bolus administered: 10 mL of 0.5 ropivacaine  Epinephrine added: 3.75 mcg/mL (1/300,000)  Additional Notes  VSS.  [facility] RN monitoring vitals throughout procedure.  Patient tolerated procedure well.

## 2017-09-13 NOTE — PLAN OF CARE
Plan of care and periop routine discussed with patient. Pt verbalized understanding. All questions answered. VSS. Respirations even and unlabored. Pt reports surgical pain is tolerable. Family has been updated. Will continue to monitor.

## 2017-09-13 NOTE — ANESTHESIA POSTPROCEDURE EVALUATION
Anesthesia Post Evaluation    Patient: Anthony Hanna    Procedure(s) Performed: Procedure(s) (LRB):  OPEN REDUCTION INTERNAL FIXATION-ANKLE - right - c-arm on left (Right)    Final Anesthesia Type: general (and regional)  Patient location during evaluation: PACU  Patient participation: Yes- Able to Participate  Level of consciousness: awake and alert and oriented  Post-procedure vital signs: reviewed and stable  Pain management: adequate  Airway patency: patent  PONV status at discharge: No PONV  Anesthetic complications: no      Cardiovascular status: blood pressure returned to baseline, hemodynamically stable and stable  Respiratory status: unassisted, spontaneous ventilation and room air  Hydration status: euvolemic  Follow-up not needed.        Visit Vitals  /77   Pulse 69   Temp 36.7 °C (98 °F) (Oral)   Resp 13   SpO2 97%       Pain/Juvenal Score: Pain Assessment Performed: Yes (9/13/2017 10:00 AM)  Presence of Pain: complains of pain/discomfort (9/13/2017 10:00 AM)  Pain Rating Prior to Med Admin: 7 (9/13/2017  9:56 AM)  Juvenal Score: 9 (9/13/2017 10:00 AM)

## 2017-09-13 NOTE — OP NOTE
DATE OF PROCEDURE:  09/13/2017.    PREOPERATIVE DIAGNOSES:  Right bimalleolar ankle fracture with lateral and   posterior malleolus fractures.    POSTOPERATIVE DIAGNOSES:  Right bimalleolar ankle fracture with lateral and   posterior malleolus fractures.    PROCEDURE PERFORMED:  Open treatment of right bimalleolar ankle fracture with   internal fixation of lateral malleolus.    SURGEON:  Florentino Garcia M.D.    ASSISTANT:  Jose Ruiz M.D. (RES).    ANESTHESIA:  General laryngeal mask plus regional.    ESTIMATED BLOOD LOSS:  10 mL.    IV FLUIDS:  1000 mL crystalloid.    IMPLANTS:  Synthes small fragment.    INDICATIONS FOR PROCEDURE:  The patient is a 61-year-old male who felt 6 feet   from a ladder sustaining a right lateral malleolus fracture, which was displaced   as well as a minimally or nondisplaced small posterior malleolar fracture   fragment.  This was clinically equivalent to a trimalleolar fracture with   deltoid ligament strain, but only a bimalleolar ankle fracture.  The patient was   treated initially with splinting and is now going to the Operating Room for   definitive fixation.  The risks, benefits, and alternatives to surgery were   discussed with the patient prior to going to the Operating Room.  Informed   consent was obtained.    PROCEDURE IN DETAIL:  The patient was identified in the preoperative holding   area and the site was marked.  Regional analgesia was performed.  The patient   was wheeled into the Operating Room and placed on the operating table in the   supine position.  General laryngeal mask anesthesia was induced.  Preoperative   antibiotics were administered.  Right lower extremity was placed in a nonsterile   tourniquet and prepped and draped in sterile fashion.  A timeout was undertaken   to confirm patient, site, surgery, surgeon and administration of preoperative   antibiotics.  All agreed and we proceeded.    The leg was exsanguinated and tourniquet was raised.  A straight  lateral   incision was made through the skin and subcutaneous tissues.  I dissected down   to the level of the fracture site, protecting the superficial peroneal nerve   anteriorly.  The patient had a little bit of callus; we removed this sharply.    He had a little bit of comminution of the posterior spike of the fracture.    After removing hematoma and early callus, a clamp was used to reduce the   fracture.  A 2 mm K-wire was placed from posterior to anterior perpendicular to   the fracture.  A 6-hole one-third tubular plate was slid in its next to last   hole over the wire and then clamped to bone.  An axillary screw was placed in   the shaft followed by a second shaft screw.  The clamp was held in place and the   K-wire removed and a lag screw placed where it had been.  A distal posterior   and anterior screw was also placed.  We had an anatomic reduction of the fibular   fracture.    I stressed the ankle in external rotation and using a cotton test and the   syndesmosis was stable.  Tourniquet was let down and hemostasis was obtained   with electrocautery.  Wound was copiously irrigated with normal saline solution.    Deep fascia was closed with 0-Vicryl suture, subcutaneous tissue with 2-0   Vicryl suture and the skin with 3-0 nylon suture in running fashion.  Sterile   dressing was applied followed by a posterior short-leg splint with stirrups.    All instrument and sponge counts were reported correct at the end of the case.    There were no complications.  The patient was awakened and taken to Recovery in   stable condition.    PLAN FOR THE PATIENT:  He will be nonweightbearing.  Sutures will come out in   two weeks, go into a hard cast.  At six weeks to eight weeks, we will begin   weightbearing as tolerated in a Cam boot if he has good fracture healing.      RACHAEL/JOJO  dd: 09/13/2017 09:27:15 (CD)  td: 09/13/2017 10:01:55 ([facility])  Doc ID   #[id number]  Job ID #[id number]    CC:

## 2017-09-13 NOTE — TRANSFER OF CARE
Anesthesia Transfer of Care Note    Patient: Anthony Hanna    Procedure(s) Performed: Procedure(s) (LRB):  OPEN REDUCTION INTERNAL FIXATION-ANKLE - right - c-arm on left (Right)    Patient location: PACU    Anesthesia Type: general    Transport from OR: Transported from OR on 6-10 L/min O2 by face mask with adequate spontaneous ventilation    Post pain: adequate analgesia    Post assessment: no apparent anesthetic complications and tolerated procedure well    Post vital signs: stable    Level of consciousness: awake, alert and lethargic    Nausea/Vomiting: no nausea/vomiting    Complications: none    Transfer of care protocol was followed      Last vitals:   Visit Vitals  /75   Pulse 75   Temp 36.5 °C (97.7 °F) (Temporal)   Resp 17   SpO2 96%

## 2017-09-15 ENCOUNTER — TELEPHONE (OUTPATIENT)
Dept: ORTHOPEDICS | Facility: CLINIC | Age: 62
End: 2017-09-15

## 2017-09-15 NOTE — TELEPHONE ENCOUNTER
Spoke with pt.  He has already scheduled his appointment as a follow up on 9/29 at 7 am with YEISON Burt.  Changed pt's appointment type for EP to PO.

## 2017-09-15 NOTE — TELEPHONE ENCOUNTER
----- Message from Manjeet Tay sent at 9/15/2017  8:11 AM CDT -----  Contact: [email or web address], [phone]  Pt called in stating that he needs 2 week post op appt scheduled. Please call return Pt's call     Thank you

## 2017-09-25 ENCOUNTER — OFFICE VISIT (OUTPATIENT)
Dept: OPTOMETRY | Facility: CLINIC | Age: 62
End: 2017-09-25
Payer: COMMERCIAL

## 2017-09-25 DIAGNOSIS — H25.13 NUCLEAR SCLEROSIS, BILATERAL: Primary | ICD-10-CM

## 2017-09-25 DIAGNOSIS — H52.4 MYOPIA WITH ASTIGMATISM AND PRESBYOPIA, BILATERAL: ICD-10-CM

## 2017-09-25 DIAGNOSIS — H52.203 MYOPIA WITH ASTIGMATISM AND PRESBYOPIA, BILATERAL: ICD-10-CM

## 2017-09-25 DIAGNOSIS — H52.13 MYOPIA WITH ASTIGMATISM AND PRESBYOPIA, BILATERAL: ICD-10-CM

## 2017-09-25 PROCEDURE — 92015 DETERMINE REFRACTIVE STATE: CPT | Mod: S$GLB,,, | Performed by: OPTOMETRIST

## 2017-09-25 PROCEDURE — 92014 COMPRE OPH EXAM EST PT 1/>: CPT | Mod: S$GLB,,, | Performed by: OPTOMETRIST

## 2017-09-25 PROCEDURE — 99999 PR PBB SHADOW E&M-EST. PATIENT-LVL II: CPT | Mod: PBBFAC,,, | Performed by: OPTOMETRIST

## 2017-09-25 RX ORDER — HYDROCODONE BITARTRATE AND ACETAMINOPHEN 7.5; 325 MG/1; MG/1
TABLET ORAL
COMMUNITY
Start: 2017-09-01 | End: 2017-11-29

## 2017-09-25 NOTE — PROGRESS NOTES
DIPAK WATSON 05/2016 Lost latest glasses, hasn't noticed any vision changes.    Would like new ones.  Recent leg injury    Last edited by Magdy Bull, OD on 9/25/2017 10:36 AM. (History)            Assessment /Plan     For exam results, see Encounter Report.    Nuclear sclerosis, bilateral    Myopia with astigmatism and presbyopia, bilateral      1. Educated pt on presence of cataracts and effects on vision. No surgery at this time. Recheck in one year.  2. Spec Rx given. Different lens options discussed with patient. RTC 1 year full exam.

## 2017-09-29 ENCOUNTER — OFFICE VISIT (OUTPATIENT)
Dept: ORTHOPEDICS | Facility: CLINIC | Age: 62
End: 2017-09-29
Payer: COMMERCIAL

## 2017-09-29 DIAGNOSIS — S82.851A TRIMALLEOLAR FRACTURE OF RIGHT ANKLE, CLOSED, INITIAL ENCOUNTER: Primary | ICD-10-CM

## 2017-09-29 PROCEDURE — 99999 PR PBB SHADOW E&M-EST. PATIENT-LVL II: CPT | Mod: PBBFAC,,, | Performed by: PHYSICIAN ASSISTANT

## 2017-09-29 PROCEDURE — 29405 APPL SHORT LEG CAST: CPT | Mod: 58,RT,S$GLB, | Performed by: PHYSICIAN ASSISTANT

## 2017-09-29 PROCEDURE — 99024 POSTOP FOLLOW-UP VISIT: CPT | Mod: S$GLB,,, | Performed by: PHYSICIAN ASSISTANT

## 2017-09-29 NOTE — PROGRESS NOTES
Mr. Hanna is a 61-year-old male who felt 6 feet from a ladder sustaining a right lateral malleolus fracture, which was displaced as well as a minimally or nondisplaced small posterior malleolar fracture fragment.  This was clinically equivalent to a trimalleolar fracture with deltoid ligament strain, but only a bimalleolar ankle fracture.  The patient was   treated initially with splinting followed by ORIF on 09/13/2017 by .     He ishere today for a post-operative visit after a   Open treatment of right bimalleolar ankle fracture with internal fixation of lateral malleolus.  by Dr. Garcia  on 09/13/2017.    he reports that he is doing ok.  Pain is controlled.  he is not taking pain medication.  he denies fever, chills, and sweats since the time of the surgery.     Physical exam:  Post op dressing taken down.  Incision is clean, dry and intact.  Sutures removed without difficulty.  Steri strips placed.     Assessment:  Post-op visit ( 2 weeks)    Plan:  - SLC  - NWB 6-8 weeks pending fracture healing, has crutches and knee scooter  - pain medication: no refill needed   - return to clinic in 4 weeks at time x-ray of his ankle is needed OOC at time consider CAM boot patient will bring from home. And advance to weight bearing as tolerated as well as PT orders.

## 2017-10-27 ENCOUNTER — HOSPITAL ENCOUNTER (OUTPATIENT)
Dept: RADIOLOGY | Facility: HOSPITAL | Age: 62
Discharge: HOME | End: 2017-10-27
Attending: PHYSICIAN ASSISTANT
Payer: COMMERCIAL

## 2017-10-27 ENCOUNTER — TELEPHONE (OUTPATIENT)
Dept: ORTHOPEDICS | Facility: CLINIC | Age: 62
End: 2017-10-27

## 2017-10-27 ENCOUNTER — OFFICE VISIT (OUTPATIENT)
Dept: ORTHOPEDICS | Facility: CLINIC | Age: 62
End: 2017-10-27
Payer: COMMERCIAL

## 2017-10-27 DIAGNOSIS — S82.851A TRIMALLEOLAR FRACTURE OF RIGHT ANKLE, CLOSED, INITIAL ENCOUNTER: Primary | ICD-10-CM
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
## 2017-10-27 PROCEDURE — 99024 POSTOP FOLLOW-UP VISIT: CPT | Mod: S$GLB,,, | Performed by: PHYSICIAN ASSISTANT

## 2017-10-27 PROCEDURE — 73610 X-RAY EXAM OF ANKLE: CPT | Mod: TC,RT

## 2017-10-27 PROCEDURE — 73610 X-RAY EXAM OF ANKLE: CPT | Mod: 26,RT,, | Performed by: RADIOLOGY

## 2017-10-27 PROCEDURE — 99999 PR PBB SHADOW E&M-EST. PATIENT-LVL III: CPT | Mod: PBBFAC,,, | Performed by: PHYSICIAN ASSISTANT

## 2017-10-27 NOTE — PROGRESS NOTES
Mr. Hanna is a 61-year-old male who felt 6 feet from a ladder sustaining a right lateral malleolus fracture, which was displaced as well as a minimally or nondisplaced small posterior malleolar fracture fragment.  This was clinically equivalent to a trimalleolar fracture with deltoid ligament strain, but only a bimalleolar ankle fracture.  The patient was   treated initially with splinting followed by ORIF on 09/13/2017 by .     He ishere today for a post-operative visit after a   Open treatment of right bimalleolar ankle fracture with internal fixation of lateral malleolus.  by Dr. Garcia  on 09/13/2017.    he reports that he is doing ok.  Pain is controlled.  he is not taking pain medication.  he denies fever, chills, and sweats since the time of the surgery.     Physical exam:  Post op dressing taken down.  Incision is clean, dry and intact.  Sutures removed without difficulty.  Steri strips placed.     RADS: hardware in good position no failure noted.     Assessment:  Post-op visit ( 6 weeks)    Plan:  - The patient was advised to keep the incision clean and dry for the next 24 hours after which he may wash the area with antibacterial soap in the shower. Will not submerge until the incision is completely healed  - CAM boot will remove daily for ROM  - PT, Order placed [facility], Patient is to make appointment himself,   - range of motion as tolerated,    - WBAT  - pain medication: no refill needed   - return to clinic in 6 weeks at time x-ray of his ankle is needed

## 2017-11-02 ENCOUNTER — TELEPHONE (OUTPATIENT)
Dept: ORTHOPEDICS | Facility: CLINIC | Age: 62
End: 2017-11-02

## 2017-11-02 ENCOUNTER — TELEPHONE (OUTPATIENT)
Dept: REHABILITATION | Facility: HOSPITAL | Age: 62
End: 2017-11-02

## 2017-11-02 ENCOUNTER — CLINICAL SUPPORT (OUTPATIENT)
Dept: REHABILITATION | Facility: HOSPITAL | Age: 62
End: 2017-11-02
Payer: COMMERCIAL

## 2017-11-02 ENCOUNTER — TELEPHONE (OUTPATIENT)
Dept: RHEUMATOLOGY | Facility: CLINIC | Age: 62
End: 2017-11-02

## 2017-11-02 DIAGNOSIS — R29.898 ANKLE WEAKNESS: ICD-10-CM

## 2017-11-02 DIAGNOSIS — M25.673 DECREASED ROM OF ANKLE: ICD-10-CM

## 2017-11-02 DIAGNOSIS — S82.841A CLOSED BIMALLEOLAR FRACTURE OF RIGHT ANKLE, INITIAL ENCOUNTER: Primary | ICD-10-CM

## 2017-11-02 DIAGNOSIS — M25.571 ACUTE RIGHT ANKLE PAIN: ICD-10-CM

## 2017-11-02 PROCEDURE — 97161 PT EVAL LOW COMPLEX 20 MIN: CPT | Mod: PO

## 2017-11-02 NOTE — PLAN OF CARE
"OUTPATIENT PHYSICAL THERAPY  PHYSICAL THERAPY EVALUATION    Name: Anthony Hanna  [facility] Number: [id number]    Diagnosis:   Encounter Diagnoses   Name Primary?    Closed bimalleolar fracture of right ankle, initial encounter Yes    Acute right ankle pain     Decreased ROM of ankle     Ankle weakness       Physician: Anne Ellison PA-C  Treatment Orders: PT Eval and Treat  Past Medical History:   Diagnosis Date    HLD (hyperlipidemia)     Nuclear sclerosis - Both Eyes 04/16/2013    patient states he is not familiar with this diagnosis      Current Outpatient Prescriptions   Medication Sig    atorvastatin (LIPITOR) 20 MG tablet Take 20 mg by mouth once daily.    citalopram (CELEXA) 40 MG tablet     docusate sodium (COLACE) 100 MG capsule Take 1 capsule (100 mg total) by mouth 2 (two) times daily. Available OTC as well    hydrocodone-acetaminophen 7.5-325mg (NORCO) 7.5-325 mg per tablet     ondansetron (ZOFRAN) 8 MG tablet Take 1 tablet (8 mg total) by mouth every 12 (twelve) hours as needed for Nausea.    oxycodone-acetaminophen (PERCOCET)  mg per tablet Take 1 tablet by mouth every 4 (four) hours as needed for Pain.     No current facility-administered medications for this visit.      Review of patient's allergies indicates:   Allergen Reactions    Pcn [penicillins] Anaphylaxis       Time in: 9:00 AM  Time Out: 10:00 AM   Total Treatment Time: 60 minutes    Date of eval: 11/2/2017  Visit #: 1/20  Auth expiration: 12/31/17  POC expiration: 2/28/18     Precautions: standard    Subjective   History of Present Illness: pt reports falling off a ladder labor day weekend and then after 10 days he returned for an ORIF surgery. Pt reports using crutches until last Friday, when he was given the "OK" to put weight on it. Pt reports having a different pain which feels more like a throbbing, pt reports tenderness in ankle. Pt reports taking more pain pills not then after the surgery. Pt reports having   DOI: " "trimalleolar ORIF sx 9/13  Onset: sudden  Patient c/o: continuous symptoms  Pain Scale: Anthony rates pain on a scale of 0-10 to be 6 at worst; 4 currently; 0 at best .  Aggravating factors: moving, walking  Relieving factors: resting, pain medication    Previous treatment: none  Imaging: X-ray (pre and post sx)  Past surgical history: 20 yrs ago had discectomy    Prior level of function: none  Functional deficits: unable to drive far distances, unable to walk > 10 ft without pain, sit > stand  Occupation: reitred, work duties include: garden  Environment: two story home with 5 steps to enter, has handrail    No cultural or spiritual barriers identified to treatment or learning.  Patient's goals: The patients goal is to return to PLOF without pain or risk of re-injury.      Objective   Mental status: oriented x3  Posture/ Alignment: Fair, uses BUE to support trunk while seated    GAIT DEVIATIONS: Anthony amb with decreased sharmaine, decreased step length, decreased toe-to-floor clearance and decreased weight-shifting ability.    FUNCTION:   - Sit <--> Stand: demonstrates preference for L LE wbing, uses B UE support for transfer      ROM:      PROM Right Left Comment Norms   DF: 10 degrees * 20 degrees  20 deg   PF: 25 degrees * 50 degrees  50 deg   Eversion: 5 degrees 25 degrees  15 deg   Inversion: 20 degrees 45 degrees  35 deg   1st MTP Ext: 125 degrees 65 degrees  80 deg   1st MTP Flex: -15 degrees 45 degrees  45 deg   *denotes pain      Strength:      Right Left Comment   DF: 4-/5 5/5 Within available range   PF: 4-/5 5/5 "   Eversion: 4-/5 5/5 "   Inversion: 4-/5 5/5 "   1st MTP Ext: 4+/5 5/5 "   1st MTP Flex: 4+/5 5/5 "         Special Tests:   Figure 8: L 64.5cm, R 57cm    Palpation: warmth and tenderness to the touch at medial and lateral malleoulus and into achilles tendon, no palpable pedis pulses    Joint Play: decreased movement at talo-crural and talo-calcaneal joints with AP glides and inv/ev    Pt/family " was provided educational information, including: role of PT, goals for PT, scheduling - pt verbalized understanding. Discussed insurance plan with pt.     Assessment   Anthony is a 61 y.o. male referred to outpatient physical therapy with a medical diagnosis of R trimalleolar closed fracture s/p ORIF surgery. Demonstrates impairments including limitations as described in the problem list. Pt prognosis is Good. Positive prognostic factors include motivation to return to PLOF. Negative prognostic factors include advanced age, chronicity of pain and degree of current swelling. Pt will benefit from skilled outpatient physical therapy to address the above stated deficits, provide pt/family education, and to maximize pt's level of independence.     History  Co-morbidities and personal factors that may impact the plan of care Examination  Body Structures and Functions, activity limitations and participation restrictions that may impact the plan of care    Clinical Presentation   Co-morbidities:   none noted        Personal Factors:   coping style  lifestyle Body Regions:   lower extremities    Body Systems:   gross symmetry  ROM  strength  balance  gait  motor control        Participation Restrictions:   Unable to walk or stand for prolonged periods     Activity limitations:   Mobility  walking  driving (bike, car, motorcycle)    Self care  no deficits    Domestic Life  no deficits    Community and Social Life  community life  recreation and leisure         stable and uncomplicated                      low   low  moderate Decision Making/ Complexity Score:  low     Pt's spiritual, cultural and educational needs considered and pt agreeable to plan of care and goals as stated below:     Anticipated Barriers for physical therapy: none noted    GOALS:  Short Term GOALS:  In 4 weeks, pt. will:  Pt will report decrease in pain </= 3 at worst in order to tolerate driving.  Pt will demonstrate >/= 25%  increase in R ankle ROM to  allow for improved gait pattern with ambulation.  Pt will demonstrate increased gastroc strength in R LE by 1 MMT grade to increase tolerance to stair navigation.  Pt will demonstrate improved balance with SLS to 15 sec to signify improved intrinsic foot mm endurance.     Long Term GOALS:  In 8 weeks, pt. Will:  Pt will report no pain in order to return to gardening and yard work.  Pt will demonstrate increase in B ankle ROM to WFL in order to allow for improved gait pattern with ambulation.  Pt will demonstrate increased gastroc strength in B LE to 4+/5 MMT grade to increase tolerance to ambulation.  Pt will demonstrate improved balance with SLS to 30 sec to signify improved intrinsic foot mm endurance and decrease fall risk.  Pt will be independent with HEP and SX management         Plan   Outpatient physical therapy 1- 2 times weekly to include: pt ed, HEP, therapeutic exercises, therapeutic activities, neuromuscular re-education/ balance exercises, manual therapy, and modalities prn. Cont PT for 3-4 months. Pt may be seen by PTA as part of the rehabilitation team.     I certify the need for these services furnished under this plan of treatment and while under my care.    Keenan Barnett, PT    Anne Ellison PA-C 11/02/2017

## 2017-11-02 NOTE — TELEPHONE ENCOUNTER
Patient reports increased swelling and mild pain with increased activity. Symptoms relieved with elevation and ice. He denied fever chills increased warmth. drainage form incision. He denied increase in medication. due to pain.

## 2017-11-02 NOTE — TELEPHONE ENCOUNTER
----- Message from Irais Haq sent at 11/2/2017 11:59 AM CDT -----  Contact: self   Pt is returning a call from Anne and can be reached at [phone]

## 2017-11-02 NOTE — PATIENT INSTRUCTIONS
Ankle Pump    With leg elevated, gently flex and extend ankle. Move through full range of motion. Avoid pain. Perform more frequently if having increased swelling.  Repeat 10 times each leg per set. Do 2 sets per session. Do 2 sessions per day.      Ankle Alphabet    Sit with leg straight out in front of you and heel off edge of bed or propped up on towel roll. Using ankle and foot only, trace the letters of the alphabet. Perform A to Z. Do not let the knee move too much side to side.  Repeat 10 times each leg per set. Do 2 sessions per day.        Gastroc, Sitting (Passive)    Sit with leg straight out in front of you and a towel under your heel and around ball of foot. Gently pull toward body. Hold 30 seconds.   Repeat 2 times each leg per set. Do 2 sets per session. Do 2 sessions per day.        Calf Raise: Bilateral (Standing)    Stand on both feet and rise on balls of feet. Do not let ankles roll out. Slowly return to start and repeat.  Repeat 10 times per set. Do 2 sets per session. Do 2 sessions per day.    Copyright © [employer]. All rights reserved.

## 2017-11-06 ENCOUNTER — CLINICAL SUPPORT (OUTPATIENT)
Dept: REHABILITATION | Facility: HOSPITAL | Age: 62
End: 2017-11-06
Payer: COMMERCIAL

## 2017-11-06 DIAGNOSIS — R29.898 ANKLE WEAKNESS: ICD-10-CM

## 2017-11-06 DIAGNOSIS — M25.571 ACUTE RIGHT ANKLE PAIN: ICD-10-CM

## 2017-11-06 DIAGNOSIS — M25.673 DECREASED ROM OF ANKLE: ICD-10-CM

## 2017-11-06 PROCEDURE — 97110 THERAPEUTIC EXERCISES: CPT | Mod: PO

## 2017-11-06 NOTE — PROGRESS NOTES
"                                                    Physical Therapy Daily Note   Name: Anthony Hanna  Clinic Number: [id number]    Evaluation Date: 11/06/2017  Visit #: 2 / 20  Authorization period Expiration: 12/31/17  Plan of Care Expiration: 2/28/18  Precautions: standard    Diagnosis:   Encounter Diagnoses   Name Primary?    Acute right ankle pain     Decreased ROM of ankle     Ankle weakness      Physician: Anne Ellison PA-C  Treatment Orders: PT Eval and Treat    Time In: 9:00 AM  Time Out: 10:00 AM  Total 1:1 Treatment Time: 30 min    Subjective   Pt reports: "my doctor thinks I was walking too much." Pt reports pain in R medial and lateral malleolus and tenderness in anterior ankle region.    Pain Scale:  2/10      Objective   Pt participated in therapeutic exercises for 35 minutes including:  Ankle pumps x 20  Heel slides c/ strap x 20  DF stretch c/ tstrap 3 x 30 sec  Standing gastroc stretch x 30 sec  Kneeling DF rocker x 1 min  B Heel raises 2 x 10  Towel scrunches x 3 min  [place] pick ups x 1 trial  PROM to R ankle inv, ev, DF, PF      Pt received manual therapy for 10 minutes including:   AP joint mobs grade II and IV to tibia for increased DF range, glides to metatarsals, great toe AP glides at PIP and DIP, STM and cross-frictional massage to achilles tendon    Pt received cold pack to raised R LE while supine in order to decrease pain, inflammation and swelling following activity- 10 min    Written Home Exercises Provided: Patient educated to continue with previously issued HEP.  Exercises were reviewed and Anthony was able to demonstrate them prior to the end of the session. Pt received a written copy of exercises to perform at home. Anthony demonstrated good  understanding of the education provided.     Assessment     Pt tolerated therapy well this session. Pt appears enthusiastic to participate in therapy this Am. Pt demonstrates increased swelling and following gravity dependent exercise, limiting " pt participation in activity. Will continue to monitor and progress as possible.  Anthony is progressing well towards his goals. Will benefit from con't skilled care.    Short Term GOALS:  In 4 weeks, pt. will:  Pt will report decrease in pain </= 3 at worst in order to tolerate driving.  Pt will demonstrate >/= 25%  increase in R ankle ROM to allow for improved gait pattern with ambulation.  Pt will demonstrate increased gastroc strength in R LE by 1 MMT grade to increase tolerance to stair navigation.  Pt will demonstrate improved balance with SLS to 15 sec to signify improved intrinsic foot mm endurance.    Anticipated barriers to physical therapy: none  Pt's spiritual, cultural and educational needs considered and pt agreeable to plan of care and goals.    Plan   Continue with established Plan of Care towards PT goals.       Keenan Barnett, PT

## 2017-11-08 ENCOUNTER — CLINICAL SUPPORT (OUTPATIENT)
Dept: REHABILITATION | Facility: HOSPITAL | Age: 62
End: 2017-11-08
Payer: COMMERCIAL

## 2017-11-08 DIAGNOSIS — M25.673 DECREASED ROM OF ANKLE: ICD-10-CM

## 2017-11-08 DIAGNOSIS — M25.571 ACUTE RIGHT ANKLE PAIN: ICD-10-CM

## 2017-11-08 DIAGNOSIS — R29.898 ANKLE WEAKNESS: ICD-10-CM

## 2017-11-08 PROCEDURE — 97110 THERAPEUTIC EXERCISES: CPT | Mod: PO

## 2017-11-08 PROCEDURE — 97140 MANUAL THERAPY 1/> REGIONS: CPT | Mod: PO

## 2017-11-08 NOTE — PROGRESS NOTES
"                                                    Physical Therapy Daily Note   Name: Anthony Hanna  Clinic Number: [id number]    Evaluation Date: 11/08/2017  Visit #: 3 / 20  Authorization period Expiration: 12/31/17  Plan of Care Expiration: 2/28/18  Precautions: standard    Diagnosis:   Encounter Diagnoses   Name Primary?    Acute right ankle pain     Decreased ROM of ankle     Ankle weakness      Physician: Anne Ellison PA-C  Treatment Orders: PT Eval and Treat    Time In: 9:05 AM  Time Out: 10:00 AM  Total 1:1 Treatment Time: 55 min    Subjective     Pt reports: Felt a little sore after last treatment.  Pt reports the soreness lasted about 2 hours. Pt reports he wears the boot just when he goes outside.  Pt reports any time he has to move the ankle up and down it is " touchy."  Pt reports he has more pain when he wears the boot.     Pain Scale: R ankle 2/10      Objective     Pt participated in therapeutic exercises for 45 minutes including:    Ankle pumps x 30  DF stretch c/ strap 3 x 30 sec  Standing gastroc stretch x 30 sec  Kneeling DF rocker x 1 min  B Heel raises 2 x 10  Towel scrunches x 3 min  [place] pick ups x 1 trial  Seated BAPS board level 2 dorsiflexion/plantarflexion x 2 minutes  Seated BAPS board level 2 inversion/eversion x 2 minutes  PROM to R ankle inv, ev, DF, PF x 5 minutes      Pt received manual therapy for 10 minutes including:   AP joint mobs grade II and IV to tibia for increased DF range, glides to metatarsals, great toe AP glides at PIP and DIP    Pt received cold pack to raised R LE while supine in order to decrease pain, inflammation and swelling following activity- 10 min    Written Home Exercises Provided: Patient educated to continue with previously issued HEP.  Exercises were reviewed and Anthony was able to demonstrate them prior to the end of the session. Pt received a written copy of exercises to perform at home. Anthony demonstrated good  understanding of the education " provided.     Assessment     Pt tolerated therapy well this session. PT called doctor's office to see when patient should start weaning from boot.  Pt was progressed in ankle mobility exercises with increased swelling following exercises.  Pt reported no increased pain with swelling. Will continue to monitor and progress as possible.  Anthony is progressing well towards his goals. Will benefit from con't skilled care.    Short Term GOALS:  In 4 weeks, pt. will:  Pt will report decrease in pain </= 3 at worst in order to tolerate driving.  Pt will demonstrate >/= 25%  increase in R ankle ROM to allow for improved gait pattern with ambulation.  Pt will demonstrate increased gastroc strength in R LE by 1 MMT grade to increase tolerance to stair navigation.  Pt will demonstrate improved balance with SLS to 15 sec to signify improved intrinsic foot mm endurance.    Anticipated barriers to physical therapy: none  Pt's spiritual, cultural and educational needs considered and pt agreeable to plan of care and goals.    Plan   Continue with established Plan of Care towards PT goals.       Jess Guzman, PTA

## 2017-11-13 ENCOUNTER — CLINICAL SUPPORT (OUTPATIENT)
Dept: REHABILITATION | Facility: HOSPITAL | Age: 62
End: 2017-11-13
Payer: COMMERCIAL

## 2017-11-13 DIAGNOSIS — R29.898 ANKLE WEAKNESS: ICD-10-CM

## 2017-11-13 DIAGNOSIS — M25.673 DECREASED ROM OF ANKLE: ICD-10-CM

## 2017-11-13 DIAGNOSIS — M25.571 ACUTE RIGHT ANKLE PAIN: ICD-10-CM

## 2017-11-13 PROCEDURE — 97110 THERAPEUTIC EXERCISES: CPT | Mod: PO

## 2017-11-13 PROCEDURE — 97140 MANUAL THERAPY 1/> REGIONS: CPT | Mod: PO

## 2017-11-13 NOTE — PROGRESS NOTES
"                                                    Physical Therapy Daily Note   Name: Anthony Hanna  [facility] Number: [id number]    Evaluation Date: 11/13/2017  Visit #: 3/20  Authorization period Expiration: 12/31/17  Plan of Care Expiration: 2/28/18  Precautions: standard    Diagnosis:   No diagnosis found.  Physician: Anne Ellison PA-C  Treatment Orders: PT Eval and Treat    Time In: 10:04 AM  Time Out: 11:00 AM  Total 1:1 Treatment Time: 56 min    Subjective     Pt reports: No increase in swelling after last treatment.  Pt reports the pain is about the same.  Patient reports the most discomfort with dorsiflexion and when he pushes it, that is when he gets the 2/10 pain.  Pt reports he wore the boot when he went out Friday and Saturday night.  Otherwise, patient did not wear it.  Pt reports he talked with the doctor and as long as he can " stomach it" he can be without the boot.  Pt reports he notices he still walking flat footed.  Pt reports he noticed he can walk upstairs, but not downstairs.    Pain Scale: R ankle 2/10      Objective     Pt participated in therapeutic exercises for 46 minutes including:    Ankle pumps x 30  Massage roller to calf x 3 minutes  DF stretch c/ strap 3 x 45 sec  Standing gastroc stretch x 30 sec  Kneeling DF rocker x 2 min  B Heel raises 3 x 10  Towel scrunches x 3 min- NP  [place] pick ups x 1 trial  Seated BAPS board level 3 dorsiflexion/plantarflexion x 2 minutes  Seated BAPS board level 3 inversion/eversion x 2 minutes  PROM to R ankle inv, ev, DF, PF x 5 minutes  Ankle eversion x 30 3 second hold      Pt received manual therapy for 10 minutes including:   AP joint mobs grade II and IV to tibia for increased DF range, glides to metatarsals    Pt received cold pack to raised R LE while supine in order to decrease pain, inflammation and swelling following activity- 10 min- NP    Written Home Exercises Provided: add ankle eversion to HEP  Exercises were reviewed and Anthony was able " "to demonstrate them prior to the end of the session. Pt received a written copy of exercises to perform at home. Anthony demonstrated good  understanding of the education provided.     Assessment     Pt tolerated therapy well this session. Pt demonstrated decreased swelling compared to previous sessions.  Pt demonstrated improved ROM following manual therapy techniques.  Pt reported being able to walk " normal" following treatment.  Will continue to monitor and progress as possible.  Anthony is progressing well towards his goals. Will benefit from con't skilled care.    Short Term GOALS:  In 4 weeks, pt. will:  Pt will report decrease in pain </= 3 at worst in order to tolerate driving.  Pt will demonstrate >/= 25%  increase in R ankle ROM to allow for improved gait pattern with ambulation.  Pt will demonstrate increased gastroc strength in R LE by 1 MMT grade to increase tolerance to stair navigation.  Pt will demonstrate improved balance with SLS to 15 sec to signify improved intrinsic foot mm endurance.    Anticipated barriers to physical therapy: none  Pt's spiritual, cultural and educational needs considered and pt agreeable to plan of care and goals.    Plan   Continue with established Plan of Care towards PT goals.       Jess Guzman, PTA  "

## 2017-11-15 ENCOUNTER — CLINICAL SUPPORT (OUTPATIENT)
Dept: REHABILITATION | Facility: HOSPITAL | Age: 62
End: 2017-11-15
Payer: COMMERCIAL

## 2017-11-15 DIAGNOSIS — M25.571 ACUTE RIGHT ANKLE PAIN: ICD-10-CM

## 2017-11-15 DIAGNOSIS — R29.898 ANKLE WEAKNESS: ICD-10-CM

## 2017-11-15 DIAGNOSIS — M25.673 DECREASED ROM OF ANKLE: ICD-10-CM

## 2017-11-15 PROCEDURE — 97140 MANUAL THERAPY 1/> REGIONS: CPT | Mod: PO

## 2017-11-15 PROCEDURE — 97110 THERAPEUTIC EXERCISES: CPT | Mod: PO

## 2017-11-15 NOTE — PROGRESS NOTES
Physical Therapy Daily Note   Name: Anthony Hanna  Clinic Number: [id number]    Evaluation Date: 11/15/2017  Visit #: 4/20  Authorization period Expiration: 12/31/17  Plan of Care Expiration: 2/28/18  Precautions: standard    Diagnosis:   Encounter Diagnoses   Name Primary?    Acute right ankle pain     Decreased ROM of ankle     Ankle weakness      Physician: Anne Ellison PA-C  Treatment Orders: PT Eval and Treat    Time In: 9:00 AM  Time Out: 9:58 AM  Total 1:1 Treatment Time: 58 min    Subjective     Pt reports: Some pain when he is walking.  Pt reports he felt good after last treatment.    Pain Scale: R ankle 2/10      Objective     PF 40 degrees  DF 10 degrees      Pt participated in therapeutic exercises for 48 minutes including:    Ankle pumps x 30  Massage roller to calf x 3 minutes  DF stretch c/ strap 3 x 45 sec  Standing gastroc stretch x 30 sec  Kneeling DF rocker x 2 min  B Heel raises 3 x 10  Towel scrunches x 3 min- NP  [place] pick ups x 1 trial- NP  Gastroc stretch on incline 3 x 30 sec  Soleus stretch on incline 3 x 30 sec  Seated BAPS board level 3 dorsiflexion/plantarflexion x 2 minutes  Seated BAPS board level 3 inversion/eversion x 2 minutes  PROM to R ankle inv, ev, DF, PF x 5 minutes  Ankle eversion x 30 3 second hold      Pt received manual therapy for 10 minutes including:   AP joint mobs grade II and IV to tibia for increased DF range, glides to metatarsals, passive gastroc stretching    Pt received cold pack to raised R LE while supine in order to decrease pain, inflammation and swelling following activity- 10 min    Written Home Exercises Provided: none added  Exercises were reviewed and Anthony was able to demonstrate them prior to the end of the session. Pt received a written copy of exercises to perform at home. Anthony demonstrated good  understanding of the education provided.     Assessment     Pt tolerated therapy well this session.   Pt's plantar flexion ROM has improved but dorsiflexion has stayed about the same.  Will try and add theraband exercises next treatment. Will continue to monitor and progress as possible.  Anthony is progressing well towards his goals. Will benefit from con't skilled care.    Short Term GOALS:  In 4 weeks, pt. will:  Pt will report decrease in pain </= 3 at worst in order to tolerate driving.  Pt will demonstrate >/= 25%  increase in R ankle ROM to allow for improved gait pattern with ambulation.  Pt will demonstrate increased gastroc strength in R LE by 1 MMT grade to increase tolerance to stair navigation.  Pt will demonstrate improved balance with SLS to 15 sec to signify improved intrinsic foot mm endurance.    Anticipated barriers to physical therapy: none  Pt's spiritual, cultural and educational needs considered and pt agreeable to plan of care and goals.    Plan   Continue with established Plan of Care towards PT goals.       Jess Guzman, PTA

## 2017-11-20 ENCOUNTER — CLINICAL SUPPORT (OUTPATIENT)
Dept: REHABILITATION | Facility: HOSPITAL | Age: 62
End: 2017-11-20
Payer: COMMERCIAL

## 2017-11-20 DIAGNOSIS — R29.898 ANKLE WEAKNESS: ICD-10-CM

## 2017-11-20 DIAGNOSIS — M25.673 DECREASED ROM OF ANKLE: ICD-10-CM

## 2017-11-20 DIAGNOSIS — M25.571 ACUTE RIGHT ANKLE PAIN: ICD-10-CM

## 2017-11-20 PROCEDURE — 97110 THERAPEUTIC EXERCISES: CPT | Mod: PO

## 2017-11-20 NOTE — PROGRESS NOTES
"                                                    Physical Therapy Daily Note   Name: Anthony Hanna  Clinic Number: [id number]    Evaluation Date: 11/20/2017  Visit #: 4/20  Authorization period Expiration: 12/31/17  Plan of Care Expiration: 2/28/18  Precautions: standard    Diagnosis:   Encounter Diagnoses   Name Primary?    Acute right ankle pain     Decreased ROM of ankle     Ankle weakness      Physician: Anne Ellison PA-C  Treatment Orders: PT Eval and Treat    Time In: 9:05 AM  Time Out: 9:35 AM  Total 1:1 Treatment Time: 30 min    Subjective     Pt reports: constant minor ache in region posterior-inferior to R lateral malleolus. Pt reports compliance with HEP and no longer wears his boot for any part of the day for the last week. Pt asked to leave session early for another appointment. "I'm paying more attention to how I walk."  Pain Scale: R ankle 1-2/10      Objective     PF 30 degrees (long sitting)  DF 18 degrees (with knee bent)      Pt participated in therapeutic exercises for 25 minutes including:    Ankle pumps x 30  Massage roller to calf x 3 minutes -NP  DF stretch c/ strap 3 x 45 sec -NP  Kneeling DF rocker x 2 min  B Heel raises 3 x 10  Towel scrunches x 3 min- NP  [place] pick ups x 1 trial- NP  Gastroc stretch on incline 3 x 30 sec  Soleus stretch on incline 3 x 30 sec  Seated BAPS board level 3 dorsiflexion/plantarflexion x 2 minutes -NP  Seated BAPS board level 3 inversion/eversion x 2 minutes -NP  PROM to R ankle inv, ev, DF, PF x 5 minutes  Ankle eversion x 30 3 second hold      Pt received manual therapy for 5 minutes including:   AP joint mobs grade II and IV to tibia for increased DF range, glides to metatarsals, passive gastroc stretching    Pt received cold pack to raised R LE while supine in order to decrease pain, inflammation and swelling following activity- 10 min    Written Home Exercises Provided: pt educated on gastroc stretch on stairs and value of stair navigation in " building strength. Pt educated on proper foot mechanics during gait and encouraged to avoid flat foot ambulation and instead use full ROM while walking.  Exercises were reviewed and Anthony was able to demonstrate them prior to the end of the session. Pt received a written copy of exercises to perform at home. Anthony demonstrated good  understanding of the education provided.     Assessment     Pt tolerated therapy well this session with improved PROM and AROM with ambulation and stair navigation.Pt would benefit on further exercises to increase ankle ROM, strength and intrinsic foot mm strength, balance and endurance.  Anthony is progressing well towards his goals. Will benefit from con't skilled care.    Short Term GOALS:  In 4 weeks, pt. will:  Pt will report decrease in pain </= 3 at worst in order to tolerate driving.  Pt will demonstrate >/= 25%  increase in R ankle ROM to allow for improved gait pattern with ambulation.  Pt will demonstrate increased gastroc strength in R LE by 1 MMT grade to increase tolerance to stair navigation.  Pt will demonstrate improved balance with SLS to 15 sec to signify improved intrinsic foot mm endurance.    Anticipated barriers to physical therapy: none  Pt's spiritual, cultural and educational needs considered and pt agreeable to plan of care and goals.    Plan   Continue with established Plan of Care towards PT goals.       Keenan Barnett, PT

## 2017-11-22 ENCOUNTER — CLINICAL SUPPORT (OUTPATIENT)
Dept: REHABILITATION | Facility: HOSPITAL | Age: 62
End: 2017-11-22
Payer: COMMERCIAL

## 2017-11-22 DIAGNOSIS — M25.673 DECREASED ROM OF ANKLE: ICD-10-CM

## 2017-11-22 DIAGNOSIS — M25.571 ACUTE RIGHT ANKLE PAIN: ICD-10-CM

## 2017-11-22 DIAGNOSIS — R29.898 ANKLE WEAKNESS: ICD-10-CM

## 2017-11-22 PROCEDURE — 97110 THERAPEUTIC EXERCISES: CPT | Mod: PO

## 2017-11-22 NOTE — PROGRESS NOTES
"                                                    Physical Therapy Daily Note   Name: Anthony Hanna  Clinic Number: [id number]    Evaluation Date: 11/22/2017  Visit #: 7/20  Authorization period Expiration: 12/31/17  Plan of Care Expiration: 2/28/18  Precautions: standard    Diagnosis:   Encounter Diagnoses   Name Primary?    Acute right ankle pain     Decreased ROM of ankle     Ankle weakness      Physician: Anne Ellison PA-C  Treatment Orders: PT Eval and Treat    Time In: 9:00 AM  Time Out: 10:00 AM  Total 1:1 Treatment Time: 60 min    Subjective     Pt reports: "this week has been a little sore from all the stretching and strengthening."  Pain Scale: R ankle 2/10      Objective     Pt participated in therapeutic exercises for 45 minutes including:    Ankle pumps x 30  Massage roller to calf x 3 minutes   Fitter board level 2 fwd/lat x 20 (will not repeat)  Baps Level 1 ccw/cw x 10  SLS x 1 min  DF stretch c/ strap 3 x 45 sec   Kneeling DF rocker x 2 min  B Heel raises 3 x 10  Towel scrunches x 3 min- NP  [place] pick ups x 1 trial  Gastroc stretch on incline 3 x 30 sec  Seated BAPS board level 3 dorsiflexion/plantarflexion x 2 minutes -NP  Seated BAPS board level 3 inversion/eversion x 2 minutes -NP  PROM to R ankle inv, ev, DF, PF x 5 minutes  Ankle eversion x 30 3 second hold      Pt received manual therapy for 5 minutes including:   AP joint mobs grade II and IV to tibia for increased DF range, glides to metatarsals, passive gastroc stretching    Pt received cold pack to raised R LE while supine in order to decrease pain, inflammation and swelling following activity- 10 min    Written Home Exercises Provided: pt educated on gastroc stretch on stairs and value of stair navigation in building strength. Pt educated on proper foot mechanics during gait and encouraged to avoid flat foot ambulation and instead use full ROM while walking.  Exercises were reviewed and Anthony was able to demonstrate them prior to " the end of the session. Pt received a written copy of exercises to perform at home. Anthony demonstrated good  understanding of the education provided.     Assessment     Pt tolerated therapy well this session with improved tolerance to strengthening and motor control activities.  Anthony is progressing well towards his goals. Will benefit from con't skilled care.    Short Term GOALS:  In 4 weeks, pt. will:  Pt will report decrease in pain </= 3 at worst in order to tolerate driving.  Pt will demonstrate >/= 25%  increase in R ankle ROM to allow for improved gait pattern with ambulation.  Pt will demonstrate increased gastroc strength in R LE by 1 MMT grade to increase tolerance to stair navigation.  Pt will demonstrate improved balance with SLS to 15 sec to signify improved intrinsic foot mm endurance.    Anticipated barriers to physical therapy: none  Pt's spiritual, cultural and educational needs considered and pt agreeable to plan of care and goals.    Plan   Continue with established Plan of Care towards PT goals.       Keenan Barnett, PT

## 2017-11-29 ENCOUNTER — TELEPHONE (OUTPATIENT)
Dept: INTERNAL MEDICINE | Facility: CLINIC | Age: 62
End: 2017-11-29

## 2017-11-29 ENCOUNTER — HOSPITAL ENCOUNTER (OUTPATIENT)
Dept: RADIOLOGY | Facility: HOSPITAL | Age: 62
Discharge: HOME | End: 2017-11-29
Attending: INTERNAL MEDICINE
Payer: COMMERCIAL

## 2017-11-29 ENCOUNTER — OFFICE VISIT (OUTPATIENT)
Dept: INTERNAL MEDICINE | Facility: CLINIC | Age: 62
End: 2017-11-29
Payer: COMMERCIAL

## 2017-11-29 VITALS
HEART RATE: 64 BPM | WEIGHT: 185.44 LBS | HEIGHT: 69 IN | DIASTOLIC BLOOD PRESSURE: 100 MMHG | TEMPERATURE: 99 F | SYSTOLIC BLOOD PRESSURE: 150 MMHG | BODY MASS INDEX: 27.46 KG/M2

## 2017-11-29 DIAGNOSIS — Z12.11 SCREEN FOR COLON CANCER: ICD-10-CM

## 2017-11-29 DIAGNOSIS — F32.A DEPRESSION, UNSPECIFIED DEPRESSION TYPE: Chronic | ICD-10-CM

## 2017-11-29 DIAGNOSIS — G89.29 CHRONIC RIGHT SHOULDER PAIN: ICD-10-CM

## 2017-11-29 DIAGNOSIS — M25.511 CHRONIC RIGHT SHOULDER PAIN: ICD-10-CM

## 2017-11-29 DIAGNOSIS — M25.511 CHRONIC RIGHT SHOULDER PAIN: Primary | ICD-10-CM

## 2017-11-29 DIAGNOSIS — Z00.00 ANNUAL PHYSICAL EXAM: Primary | ICD-10-CM

## 2017-11-29 DIAGNOSIS — R03.0 ELEVATED BLOOD PRESSURE READING: Chronic | ICD-10-CM

## 2017-11-29 DIAGNOSIS — G89.29 CHRONIC RIGHT SHOULDER PAIN: Primary | ICD-10-CM

## 2017-11-29 DIAGNOSIS — Z72.0 TOBACCO ABUSE: Chronic | ICD-10-CM

## 2017-11-29 DIAGNOSIS — E78.49 OTHER HYPERLIPIDEMIA: Chronic | ICD-10-CM

## 2017-11-29 DIAGNOSIS — Z12.5 PROSTATE CANCER SCREENING: ICD-10-CM

## 2017-11-29 PROCEDURE — 99999 PR PBB SHADOW E&M-EST. PATIENT-LVL III: CPT | Mod: PBBFAC,,, | Performed by: INTERNAL MEDICINE

## 2017-11-29 PROCEDURE — 73030 X-RAY EXAM OF SHOULDER: CPT | Mod: TC,PO,RT

## 2017-11-29 PROCEDURE — 73030 X-RAY EXAM OF SHOULDER: CPT | Mod: 26,RT,, | Performed by: RADIOLOGY

## 2017-11-29 PROCEDURE — 99386 PREV VISIT NEW AGE 40-64: CPT | Mod: S$GLB,,, | Performed by: INTERNAL MEDICINE

## 2017-11-29 RX ORDER — VARENICLINE TARTRATE 0.5 MG/1
0.5 TABLET, FILM COATED ORAL 2 TIMES DAILY
Qty: 9 TABLET | Refills: 0 | Status: SHIPPED | OUTPATIENT
Start: 2017-11-29 | End: 2019-10-28

## 2017-11-29 RX ORDER — VARENICLINE TARTRATE 1 MG/1
1 TABLET, FILM COATED ORAL 2 TIMES DAILY
Qty: 60 TABLET | Refills: 2 | Status: SHIPPED | OUTPATIENT
Start: 2017-11-29 | End: 2017-12-29

## 2017-11-29 RX ORDER — ATORVASTATIN CALCIUM 20 MG/1
20 TABLET, FILM COATED ORAL DAILY
Qty: 90 TABLET | Refills: 3 | Status: SHIPPED | OUTPATIENT
Start: 2017-11-29 | End: 2018-10-22 | Stop reason: SDUPTHER

## 2017-11-29 RX ORDER — ASPIRIN 81 MG/1
81 TABLET ORAL DAILY
COMMUNITY

## 2017-11-29 RX ORDER — CITALOPRAM 40 MG/1
40 TABLET, FILM COATED ORAL DAILY
Qty: 90 TABLET | Refills: 3 | Status: SHIPPED | OUTPATIENT
Start: 2017-11-29 | End: 2018-10-22 | Stop reason: SDUPTHER

## 2017-11-29 NOTE — TELEPHONE ENCOUNTER
No fracture dislocation of the shoulder.  No evidence of ossifications.  I'm referring you to orthopedics.  Released to patient portal.

## 2017-11-29 NOTE — PROGRESS NOTES
Subjective:       Patient ID: Anthony Hanna is a 61 y.o. male.    Chief Complaint: Annual Exam and Establish Care    HPI     61 y.o. male here for annual exam.     Cholesterol: needs  Vaccines: Influenza - refused; Tetanus - 2017; Pneumovax - needs; Prevnar - needs; Zoster - needs  Sexual Screening:   STD screening: no concern  Eye exam: done last couple months ago  Prostate: needs  Colonoscopy: done at 48 yo.  A1c: needs    Exercise:  Little bit on the cycling machine - 40 minutes twice a week.  Diet:  Home cooked and eating out.  Drinks water, coffee, tea, juice.    Right shoulder - hard to lift.  It has been going on 4 months.  His traps engage after 90 degrees.  He has trouble reaching his back with his right arm.  He is compensating more in the last month.  No falls.  He cannot attribute this to any trauma.    Past Medical History:   Diagnosis Date    HLD (hyperlipidemia)     Nuclear sclerosis - Both Eyes 04/16/2013    patient states he is not familiar with this diagnosis      History reviewed. No pertinent surgical history.  Social History     Social History    Marital status:      Spouse name: N/A    Number of children: N/A    Years of education: N/A     Occupational History    Not on file.     Social History Main Topics    Smoking status: Current Some Day Smoker     Packs/day: 0.25     Types: Cigarettes    Smokeless tobacco: Never Used      Comment: 5 cigarettes per week    Alcohol use Yes      Comment: couple beers a week    Drug use: No    Sexual activity: Yes     Partners: Female      Comment:      Other Topics Concern    Not on file     Social History Narrative    No narrative on file     Review of patient's allergies indicates:   Allergen Reactions    Pcn [penicillins] Anaphylaxis     Mr. Hanna had no medications administered during this visit.    Review of Systems   Constitutional: Negative for chills, fever and unexpected weight change.   HENT: Negative for  congestion, postnasal drip and sore throat.    Eyes: Negative for redness and visual disturbance.   Respiratory: Positive for cough (months. Nothing tried. Dry cough. Smoker's cough). Negative for shortness of breath.    Cardiovascular: Negative for chest pain and palpitations.   Gastrointestinal: Negative for abdominal pain, constipation, diarrhea, nausea and vomiting.        Has umbilical hernia - no US as yet.   Genitourinary: Negative for dysuria, frequency and hematuria.   Musculoskeletal: Positive for arthralgias (right shoulder). Negative for myalgias.   Skin: Negative for color change and rash.   Neurological: Negative for dizziness and headaches.       Objective:      Physical Exam   Constitutional: He is oriented to person, place, and time. He appears well-developed and well-nourished.   HENT:   Head: Normocephalic and atraumatic.   Mouth/Throat: No oropharyngeal exudate.   Eyes: EOM are normal. Pupils are equal, round, and reactive to light. Right eye exhibits no discharge. Left eye exhibits no discharge. No scleral icterus.   Neck: Normal range of motion. Neck supple. No tracheal deviation present. No thyromegaly present.   Cardiovascular: Normal rate, regular rhythm and normal heart sounds.  Exam reveals no gallop and no friction rub.    No murmur heard.  Pulmonary/Chest: Effort normal and breath sounds normal. No respiratory distress. He has no wheezes. He has no rales. He exhibits no tenderness.   Abdominal: Soft. Bowel sounds are normal. He exhibits no distension and no mass. There is no tenderness. There is no rebound and no guarding.   Musculoskeletal: Normal range of motion. He exhibits no edema or tenderness.   Neurological: He is alert and oriented to person, place, and time.   Skin: Skin is warm and dry. No rash noted. No erythema. No pallor.   Psychiatric: He has a normal mood and affect. His behavior is normal.   Vitals reviewed.      Assessment:       1. Annual physical exam    2. Other  hyperlipidemia    3. Screen for colon cancer    4. Chronic right shoulder pain    5. Prostate cancer screening    6. Depression, unspecified depression type    7. Tobacco abuse    8. Elevated blood pressure reading        Plan:       1.  Check CBC, CP, TSH, lipids, A1c, PSA.  2.  Continue Lipitor 20 mg daily.  3.  Colonoscopy ordered.  4.  Check x-ray.  Based on this, refer to either physical therapy or orthopedics.  6.  Continue Celexa 40 mrem daily.  7.  Counseled on tobacco cessation.  Prescribed Chantix.  8.  Return to clinic in one month to reassess.

## 2017-11-30 ENCOUNTER — CLINICAL SUPPORT (OUTPATIENT)
Dept: REHABILITATION | Facility: HOSPITAL | Age: 62
End: 2017-11-30
Payer: COMMERCIAL

## 2017-11-30 ENCOUNTER — LAB VISIT (OUTPATIENT)
Dept: LAB | Facility: HOSPITAL | Age: 62
End: 2017-11-30
Attending: INTERNAL MEDICINE
Payer: COMMERCIAL

## 2017-11-30 DIAGNOSIS — R29.898 ANKLE WEAKNESS: ICD-10-CM

## 2017-11-30 DIAGNOSIS — Z12.5 PROSTATE CANCER SCREENING: ICD-10-CM

## 2017-11-30 DIAGNOSIS — M25.673 DECREASED ROM OF ANKLE: ICD-10-CM

## 2017-11-30 DIAGNOSIS — M25.571 ACUTE RIGHT ANKLE PAIN: ICD-10-CM

## 2017-11-30 DIAGNOSIS — Z00.00 ANNUAL PHYSICAL EXAM: ICD-10-CM

## 2017-11-30 LAB
ALBUMIN SERPL BCP-MCNC: 3.8 G/DL
ALP SERPL-CCNC: 66 U/L
ALT SERPL W/O P-5'-P-CCNC: 9 U/L
ANION GAP SERPL CALC-SCNC: 8 MMOL/L
AST SERPL-CCNC: 12 U/L
BASOPHILS # BLD AUTO: 0.03 K/UL
BASOPHILS NFR BLD: 0.3 %
BILIRUB SERPL-MCNC: 0.3 MG/DL
BUN SERPL-MCNC: 26 MG/DL
CALCIUM SERPL-MCNC: 9.1 MG/DL
CHLORIDE SERPL-SCNC: 110 MMOL/L
CHOLEST SERPL-MCNC: 151 MG/DL
CHOLEST/HDLC SERPL: 4.2 {RATIO}
CO2 SERPL-SCNC: 24 MMOL/L
COMPLEXED PSA SERPL-MCNC: 1.7 NG/ML
CREAT SERPL-MCNC: 0.9 MG/DL
DIFFERENTIAL METHOD: NORMAL
EOSINOPHIL # BLD AUTO: 0.5 K/UL
EOSINOPHIL NFR BLD: 5.6 %
ERYTHROCYTE [DISTWIDTH] IN BLOOD BY AUTOMATED COUNT: 13.4 %
EST. GFR  (AFRICAN AMERICAN): >60 ML/MIN/1.73 M^2
EST. GFR  (NON AFRICAN AMERICAN): >60 ML/MIN/1.73 M^2
ESTIMATED AVG GLUCOSE: 97 MG/DL
GLUCOSE SERPL-MCNC: 91 MG/DL
HBA1C MFR BLD HPLC: 5 %
HCT VFR BLD AUTO: 46.8 %
HDLC SERPL-MCNC: 36 MG/DL
HDLC SERPL: 23.8 %
HGB BLD-MCNC: 15.1 G/DL
IMM GRANULOCYTES # BLD AUTO: 0.01 K/UL
IMM GRANULOCYTES NFR BLD AUTO: 0.1 %
LDLC SERPL CALC-MCNC: 98.8 MG/DL
LYMPHOCYTES # BLD AUTO: 2.5 K/UL
LYMPHOCYTES NFR BLD: 28.8 %
MCH RBC QN AUTO: 28.1 PG
MCHC RBC AUTO-ENTMCNC: 32.3 G/DL
MCV RBC AUTO: 87 FL
MONOCYTES # BLD AUTO: 0.7 K/UL
MONOCYTES NFR BLD: 8 %
NEUTROPHILS # BLD AUTO: 5 K/UL
NEUTROPHILS NFR BLD: 57.2 %
NONHDLC SERPL-MCNC: 115 MG/DL
NRBC BLD-RTO: 0 /100 WBC
PLATELET # BLD AUTO: 159 K/UL
PMV BLD AUTO: 9.7 FL
POTASSIUM SERPL-SCNC: 4.3 MMOL/L
PROT SERPL-MCNC: 6.9 G/DL
RBC # BLD AUTO: 5.38 M/UL
SODIUM SERPL-SCNC: 142 MMOL/L
TRIGL SERPL-MCNC: 81 MG/DL
TSH SERPL DL<=0.005 MIU/L-ACNC: 1.84 UIU/ML
WBC # BLD AUTO: 8.74 K/UL

## 2017-11-30 PROCEDURE — 83036 HEMOGLOBIN GLYCOSYLATED A1C: CPT

## 2017-11-30 PROCEDURE — 36415 COLL VENOUS BLD VENIPUNCTURE: CPT | Mod: PO

## 2017-11-30 PROCEDURE — 84443 ASSAY THYROID STIM HORMONE: CPT

## 2017-11-30 PROCEDURE — 97140 MANUAL THERAPY 1/> REGIONS: CPT | Mod: PO

## 2017-11-30 PROCEDURE — 80061 LIPID PANEL: CPT

## 2017-11-30 PROCEDURE — 97110 THERAPEUTIC EXERCISES: CPT | Mod: PO

## 2017-11-30 PROCEDURE — 85025 COMPLETE CBC W/AUTO DIFF WBC: CPT

## 2017-11-30 PROCEDURE — 84153 ASSAY OF PSA TOTAL: CPT

## 2017-11-30 PROCEDURE — 80053 COMPREHEN METABOLIC PANEL: CPT

## 2017-11-30 NOTE — PROGRESS NOTES
"                                                    Physical Therapy Daily Note   Name: Anthony Hanna  Clinic Number: [id number]    Evaluation Date: 11/30/2017  Visit #:8//20  Authorization period Expiration: 12/31/17  Plan of Care Expiration: 2/28/18  Precautions: standard    Diagnosis:   Encounter Diagnoses   Name Primary?    Acute right ankle pain     Decreased ROM of ankle     Ankle weakness      Physician: Anne Ellison PA-C  Treatment Orders: PT Eval and Treat    Time In: 9:00 AM  Time Out: 10:00 AM  Total 1:1 Treatment Time: 60 min    Subjective     Pt reports: "I feel like I'm not getting any better." Pt reports frustration with the length of his recovery process but reports noticing improvements in his gait and with descending stairs but after prolonged activity, he notices his muscles become tense, his foot aches and his form suffers.  Pain Scale: R ankle 2/10, at worst towards the end of the day 4/10 with moving it      Objective   Posture/ Alignment: Fair, uses BUE to support trunk while seated     GAIT DEVIATIONS: Anthony amb with improved weight-shifting ability, improved sharmaine.     FUNCTION:   - Sit <--> Stand: demonstrates preference for L LE wbing, uses B UE support for transfer        ROM:        PROM Right Left Comment Norms   DF: 18 degrees * 20 degrees  at end range 20 deg   PF: 45 degrees  50 degrees   50 deg   Eversion: 10 degrees 25 degrees   15 deg   Inversion: 30 degrees 45 degrees   35 deg   1st MTP Ext: 50 degrees 65 degrees   80 deg   1st MTP Flex: 30 degrees 45 degrees   45 deg   *denotes pain        Strength:        Right Left Comment   DF: 4+/5 5/5    PF: 4+/5 5/5    Eversion: 4+/5 5/5    Inversion: 4+/5 5/5    1st MTP Ext: 4+/5 5/5    1st MTP Flex: 4+/5 5/5             Special Tests:   Figure 8: L 60 cm, R 57cm     Palpation: good wound healing, mild tenderness upon palpation of wound region     Joint Play: good motion of talocalcaneal, talocrural and tarso-metatarsal " joints      Pt participated in therapeutic exercises for 45 minutes including:    Ankle pumps x 30 -NP  Massage roller to calf x 3 minutes  -NP  [place]itter board level 2 fwd/lat x 20 (will not repeat)- NP  Baps Level 1 ccw/cw x 10  SLS x 1 min  Fwd lunges c/ knee tap (staff for stability) 4 x 3 steps  Squats c/ verbal cues for form x 10  DF stretch c/ strap 3 x 45 sec -NP  Kneeling DF rocker x 2 min -NP  B Heel raises 3 x 10  Towel scrunches x 3 min- NP  [place] pick ups x 1 trial -NP  Gastroc stretch on incline 3 x 30 sec  Seated BAPS board level 3 dorsiflexion/plantarflexion x 2 minutes -NP  Seated BAPS board level 3 inversion/eversion x 2 minutes -NP  PROM to R ankle inv, ev, DF, PF x 5 minutes  Ankle eversion x 30 3 second hold -NP      Pt received manual therapy for 10 minutes including:   AP joint mobs grade II and IV to tibia for increased DF range, glides to metatarsals, passive gastroc stretching    Pt received cold pack to raised R LE while supine in order to decrease pain, inflammation and swelling following activity- NP    Written Home Exercises Provided: pt educated on gastroc stretch on stairs and value of stair navigation in building strength. Pt educated on proper foot mechanics during gait and encouraged to avoid flat foot ambulation and instead use full ROM while walking.  Exercises were reviewed and Anthony was able to demonstrate them prior to the end of the session. Pt received a written copy of exercises to perform at home. Anthony demonstrated good  understanding of the education provided.     Assessment     Pt demonstrates improved strength, ROM, and balance. Pt tolerated therapy well this session with good motivation to improve. Pt continues to demonstrate impatience with recovery process but voices understanding of strengthening process and is enthusiastic about continuing to improve with therapy.  Anthony is progressing well towards his goals, meeting 4/5 STGs. Will benefit from con't skilled  care.    Updated Short Term GOALS:  In 4 weeks, pt. Will:  Pt will report no pain in order to return to gardening and yard work.  Pt will demonstrate increase in B ankle ROM to WFL in order to allow for improved gait pattern with ambulation.  Pt will demonstrate increased gastroc strength in B LE to 4+/5 MMT grade to increase tolerance to ambulation.  Pt will demonstrate improved balance with SLS to 30 sec to signify improved intrinsic foot mm endurance and decrease fall risk.  Pt will be independent with HEP and SX management    Anticipated barriers to physical therapy: none  Pt's spiritual, cultural and educational needs considered and pt agreeable to plan of care and goals.    Plan   Continue with established Plan of Care towards PT goals.       Keenan Barnett, PT

## 2017-12-01 ENCOUNTER — OFFICE VISIT (OUTPATIENT)
Dept: ORTHOPEDICS | Facility: CLINIC | Age: 62
End: 2017-12-01
Payer: COMMERCIAL

## 2017-12-01 VITALS
WEIGHT: 182.75 LBS | BODY MASS INDEX: 27.07 KG/M2 | DIASTOLIC BLOOD PRESSURE: 97 MMHG | HEIGHT: 69 IN | SYSTOLIC BLOOD PRESSURE: 148 MMHG | HEART RATE: 81 BPM

## 2017-12-01 DIAGNOSIS — M75.41 IMPINGEMENT SYNDROME OF RIGHT SHOULDER: Primary | ICD-10-CM

## 2017-12-01 PROCEDURE — 99999 PR PBB SHADOW E&M-EST. PATIENT-LVL III: CPT | Mod: PBBFAC,,, | Performed by: PHYSICIAN ASSISTANT

## 2017-12-01 PROCEDURE — 99213 OFFICE O/P EST LOW 20 MIN: CPT | Mod: 24,S$GLB,, | Performed by: PHYSICIAN ASSISTANT

## 2017-12-01 PROCEDURE — 20610 DRAIN/INJ JOINT/BURSA W/O US: CPT | Mod: RT,S$GLB,, | Performed by: PHYSICIAN ASSISTANT

## 2017-12-01 RX ORDER — METHYLPREDNISOLONE ACETATE 80 MG/ML
80 INJECTION, SUSPENSION INTRA-ARTICULAR; INTRALESIONAL; INTRAMUSCULAR; SOFT TISSUE
Status: COMPLETED | OUTPATIENT
Start: 2017-12-01 | End: 2017-12-01

## 2017-12-01 RX ADMIN — METHYLPREDNISOLONE ACETATE 80 MG: 80 INJECTION, SUSPENSION INTRA-ARTICULAR; INTRALESIONAL; INTRAMUSCULAR; SOFT TISSUE at 01:12

## 2017-12-01 NOTE — PROGRESS NOTES
Subjective:      Patient ID: Anthony Hanna is a 61 y.o. male.    Chief Complaint: No chief complaint on file.    HPI  61 year old male presents with chief complaint of right shoulder pain x 4-5 months. He is RHD and denies trauma. Pain is worse with moving it and laying on it. Aleve and ibuprofen provide little relief. No PT or injection has been done.   Review of Systems   Constitution: Negative for chills, fever and night sweats.   Cardiovascular: Negative for chest pain.   Respiratory: Negative for cough and shortness of breath.    Hematologic/Lymphatic: Does not bruise/bleed easily.   Skin: Negative for color change.   Gastrointestinal: Negative for heartburn.   Genitourinary: Negative for dysuria.   Neurological: Negative for numbness and paresthesias.   Psychiatric/Behavioral: Negative for altered mental status.   Allergic/Immunologic: Negative for persistent infections.         Objective:            General    Vitals reviewed.  Constitutional: He is oriented to person, place, and time. He appears well-developed and well-nourished.   Cardiovascular: Normal rate.    Neurological: He is alert and oriented to person, place, and time.         Right Shoulder Exam     Range of Motion   Active Abduction: normal   Forward Flexion: normal   External Rotation 0 degrees: normal   Internal Rotation 0 degrees: normal     Tests & Signs   Drop Arm: negative  Hawkin's test: positive  Impingement: positive  Rotator Cuff Painful Arc/Range: mild  Speed's Test: positive    Other   Sensation: normal    Comments:  Positive empty can test.     Muscle Strength   Right Upper Extremity   Shoulder Abduction: 5/5   Supraspinatus: 5/5/5   Biceps: 5/5/5     Vascular Exam     Right Pulses      Radial:                    2+          X-ray: reviewed by myself. No fx or dislocation.         Assessment:       Encounter Diagnosis   Name Primary?    Impingement syndrome of right shoulder Yes          Plan:       Discussed treatment options  with patient. He would like an injection. If pain does not improve, will consider PT. RTC prn.     PROCEDURE:  I have explained the risks, benefits, and alternatives of the procedure in detail.  The patient voices understanding and all questions have been answered.  The patient agrees to proceed as planned. So after I performed a sterile prep of the skin in the normal fashion the right shoulder is injected from the anterior approach using a 22 gauge needle with a combination of 4cc 1% plain lidocaine and 80 mg of depo medrol. The patient is cautioned and immediate relief of pain is secondary to the local anesthetic and will be temporary.  After the anesthetic wears off there may be a increase in pain that may last for a few hours or a few days and they should use ice to help alleviate this flair up of pain.

## 2017-12-05 ENCOUNTER — CLINICAL SUPPORT (OUTPATIENT)
Dept: REHABILITATION | Facility: HOSPITAL | Age: 62
End: 2017-12-05
Payer: COMMERCIAL

## 2017-12-05 DIAGNOSIS — M25.571 ACUTE RIGHT ANKLE PAIN: ICD-10-CM

## 2017-12-05 DIAGNOSIS — M25.673 DECREASED ROM OF ANKLE: ICD-10-CM

## 2017-12-05 DIAGNOSIS — R29.898 ANKLE WEAKNESS: ICD-10-CM

## 2017-12-05 PROCEDURE — 97110 THERAPEUTIC EXERCISES: CPT | Mod: PO

## 2017-12-05 PROCEDURE — 97140 MANUAL THERAPY 1/> REGIONS: CPT | Mod: PO

## 2017-12-05 NOTE — PROGRESS NOTES
"                                                    Physical Therapy Daily Note   Name: Anthony Hanna  Clinic Number: [id number]    Evaluation Date: 12/05/2017  Visit #:9/20  Authorization period Expiration: 12/31/17  Plan of Care Expiration: 2/28/18  Precautions: standard    Diagnosis:   Encounter Diagnoses   Name Primary?    Acute right ankle pain     Decreased ROM of ankle     Ankle weakness      Physician: Anne Ellison, MICHELLE  Treatment Orders: PT Eval and Treat    Time In: 9:00 AM  Time Out: 10:00 AM  Total 1:1 Treatment Time: 60 min    Subjective     Pt reports:"I'm doing good- I think the swelling is getting better- I could actually see veins." Pt reportcompliance with HEP, performing gastroc stretch 2-3x/day.  Pain Scale: R ankle 2/10      Objective       Pt participated in therapeutic exercises for 45 minutes including:    Ankle pumps x 10   Massage roller to calf x 3 minutes    Baps Level 2 ccw/cw x 10  SLS on foam x 1 min  Fwd lunges c/ knee tap (staff for stability) 4 x 3 steps  Squats c/ verbal cues for form 3 x 5  DF stretch c/ strap 3 x 30 sec   Kneeling DF rocker x 2 min   B Heel raises (advance to toe touch L LE) 3 x 10  Towel scrunches x 3 min  [place] pick ups x 1 trial   Gastroc stretch on incline 3 x 30 sec  Lateral step ups 6" 3 x 5  Hip hikes x 20  Stair gastroc stretch 2 x 30 sec  Bicycle with focus on ankle ROM level 4 x 4 mins    Exercises not performed today:  Seated BAPS board level 3 dorsiflexion/plantarflexion x 2 minutes -NP  Seated BAPS board level 3 inversion/eversion x 2 minutes -NP  PROM to R ankle inv, ev, DF, PF x 5 minutes  Ankle eversion x 30 3 second hold -NP  Fitter board level 2 fwd/lat x 20 (will not repeat)- NP    Pt received manual therapy for 10 minutes including:   AP joint mobs grade II and IV to tibia for increased DF range, glides to metatarsals, gastroc contract relax trigger point release    Pt received cold pack to raised R LE while supine in order to decrease " pain, inflammation and swelling following activity- NP    Written Home Exercises Provided: pt educated on gastroc stretch on stairs and value of stair navigation in building strength. Pt educated on proper foot mechanics during gait and encouraged to avoid flat foot ambulation and instead use full ROM while walking.  Exercises were reviewed and Anthony was able to demonstrate them prior to the end of the session. Pt received a written copy of exercises to perform at home. Anthony demonstrated good  understanding of the education provided.     Assessment     Pt tolerated therapy well this morning with good motivation for strength training. Pt demonstrates mm fatigue towards completion of session, especially following squats and step downs.  Anthony is progressing well towards his goals. Will benefit from con't skilled care.    Updated Short Term GOALS:  In 4 weeks, pt. Will:  Pt will report no pain in order to return to gardening and yard work.  Pt will demonstrate increase in B ankle ROM to WFL in order to allow for improved gait pattern with ambulation.  Pt will demonstrate increased gastroc strength in B LE to 4+/5 MMT grade to increase tolerance to ambulation.  Pt will demonstrate improved balance with SLS to 30 sec to signify improved intrinsic foot mm endurance and decrease fall risk.  Pt will be independent with HEP and SX management    Anticipated barriers to physical therapy: none  Pt's spiritual, cultural and educational needs considered and pt agreeable to plan of care and goals.    Plan   Continue with established Plan of Care towards PT goals.       Keenan Barnett, PT

## 2017-12-07 ENCOUNTER — CLINICAL SUPPORT (OUTPATIENT)
Dept: REHABILITATION | Facility: HOSPITAL | Age: 62
End: 2017-12-07
Payer: COMMERCIAL

## 2017-12-07 DIAGNOSIS — M25.673 DECREASED ROM OF ANKLE: ICD-10-CM

## 2017-12-07 DIAGNOSIS — R29.898 ANKLE WEAKNESS: ICD-10-CM

## 2017-12-07 DIAGNOSIS — M25.571 ACUTE RIGHT ANKLE PAIN: ICD-10-CM

## 2017-12-07 PROCEDURE — 97110 THERAPEUTIC EXERCISES: CPT | Mod: PO

## 2017-12-07 PROCEDURE — 97140 MANUAL THERAPY 1/> REGIONS: CPT | Mod: PO

## 2017-12-07 NOTE — PROGRESS NOTES
"                                                    Physical Therapy Daily Note   Name: Anthony Hanna  Clinic Number: [id number]    Evaluation Date: 12/07/2017  Visit #:9/20  Authorization period Expiration: 12/31/17  Plan of Care Expiration: 2/28/18  Precautions: standard    Diagnosis:   Encounter Diagnoses   Name Primary?    Acute right ankle pain     Decreased ROM of ankle     Ankle weakness      Physician: Anne Ellison PA-C  Treatment Orders: PT Eval and Treat    Time In: 9:00 AM  Time Out: 10:00 AM  Total 1:1 Treatment Time: 60 min    Subjective     Pt reports:"I'm doing good- yesterday for about 6-8 hours I didn't even notice my foot." Pt reports this is the first time he's walked for this long without any pain.  Pain Scale: R ankle 2/10      Objective   Upright bicycle 4 mins level 4 c/ focus on ankle ROM    Pt participated in therapeutic exercises for 45 minutes including:  Massage roller to calf x 3 minutes    Gastroc stretch on incline 3 x 30 sec  Baps Level 3 ccw/cw x 10  SLS on BOSU 2 x 1 min  Fwd lunges c/ knee tap (staff for stability) 4 x 3 steps  Squats on BOSU c/ verbal cues for form 3 x 5  Kneeling DF rocker x 2 min   B Heel raises (advance to toe touch L LE) 3 x 10  Lateral step ups 12" 3 x 5  Fwd step ups 12" 3 x 5  Stair gastroc stretch 2 x 30 sec  Hip flex/ abd/ ext 40# 2 x 10    Exercises not performed today:  Seated BAPS board level 3 dorsiflexion/plantarflexion x 2 minutes -NP  Seated BAPS board level 3 inversion/eversion x 2 minutes -NP  PROM to R ankle inv, ev, DF, PF x 5 minutes  Ankle eversion x 30 3 second hold -NP  Fitter board level 2 fwd/lat x 20 (will not repeat)- NP  DF stretch c/ strap 3 x 30 sec  Ankle pumps x 10   Towel scrunches x 3 min  [place] pick ups x 1 trial   Hip hikes x 20    Pt received manual therapy for 10 minutes including:   AP joint mobs grade II and IV to tibia for increased DF range, glides to metatarsals, gastroc contract relax trigger point release    Pt " received cold pack to raised R LE while supine in order to decrease pain, inflammation and swelling following activity- NP    Written Home Exercises Provided: pt educated on gastroc stretch on stairs and value of stair navigation in building strength. Pt educated on proper foot mechanics during gait and encouraged to avoid flat foot ambulation and instead use full ROM while walking.  Exercises were reviewed and Anthony was able to demonstrate them prior to the end of the session. Pt received a written copy of exercises to perform at home. Anthony demonstrated good  understanding of the education provided.     Assessment     Pt tolerated therapy well this morning with normal mm fatigue towards completion of session.   Anthony is progressing well towards his goals. Will benefit from con't skilled care.    Updated Short Term GOALS:  In 4 weeks, pt. Will:  Pt will report no pain in order to return to gardening and yard work.  Pt will demonstrate increase in B ankle ROM to WFL in order to allow for improved gait pattern with ambulation.  Pt will demonstrate increased gastroc strength in B LE to 4+/5 MMT grade to increase tolerance to ambulation.  Pt will demonstrate improved balance with SLS to 30 sec to signify improved intrinsic foot mm endurance and decrease fall risk.  Pt will be independent with HEP and SX management    Anticipated barriers to physical therapy: none  Pt's spiritual, cultural and educational needs considered and pt agreeable to plan of care and goals.    Plan   Continue with established Plan of Care towards PT goals.       Keenan Barnett, PT

## 2017-12-08 ENCOUNTER — DOCUMENTATION ONLY (OUTPATIENT)
Dept: ORTHOPEDICS | Facility: CLINIC | Age: 62
End: 2017-12-08

## 2017-12-08 NOTE — PROGRESS NOTES
Notified pt. Due to the weather AKASH Ellison PA-C is not in today 11/08/17. R/S 12/11/17 at 7:45am. Patient states verbal understanding and has no further questions.

## 2017-12-11 ENCOUNTER — OFFICE VISIT (OUTPATIENT)
Dept: ORTHOPEDICS | Facility: CLINIC | Age: 62
End: 2017-12-11
Payer: COMMERCIAL

## 2017-12-11 ENCOUNTER — HOSPITAL ENCOUNTER (OUTPATIENT)
Dept: RADIOLOGY | Facility: HOSPITAL | Age: 62
Discharge: HOME | End: 2017-12-11
Attending: PHYSICIAN ASSISTANT
Payer: COMMERCIAL

## 2017-12-11 DIAGNOSIS — S82.851A TRIMALLEOLAR FRACTURE OF RIGHT ANKLE, CLOSED, INITIAL ENCOUNTER: Primary | ICD-10-CM

## 2017-12-11 DIAGNOSIS — S82.851A TRIMALLEOLAR FRACTURE OF RIGHT ANKLE, CLOSED, INITIAL ENCOUNTER: ICD-10-CM

## 2017-12-11 PROCEDURE — 73610 X-RAY EXAM OF ANKLE: CPT | Mod: 26,RT,, | Performed by: RADIOLOGY

## 2017-12-11 PROCEDURE — 73610 X-RAY EXAM OF ANKLE: CPT | Mod: TC,RT

## 2017-12-11 PROCEDURE — 99024 POSTOP FOLLOW-UP VISIT: CPT | Mod: S$GLB,,, | Performed by: PHYSICIAN ASSISTANT

## 2017-12-11 PROCEDURE — 99999 PR PBB SHADOW E&M-EST. PATIENT-LVL II: CPT | Mod: PBBFAC,,, | Performed by: PHYSICIAN ASSISTANT

## 2017-12-11 NOTE — PROGRESS NOTES
Mr. Hanna is a 61-year-old male who felt 6 feet from a ladder sustaining a right lateral malleolus fracture, which was displaced as well as a minimally or nondisplaced small posterior malleolar fracture fragment.  This was clinically equivalent to a trimalleolar fracture with deltoid ligament strain, but only a bimalleolar ankle fracture.  The patient was   treated initially with splinting followed by ORIF on 09/13/2017 by .     He ishere today for a post-operative visit after a   Open treatment of right bimalleolar ankle fracture with internal fixation of lateral malleolus.  by Dr. Garcia  on 09/13/2017.    he reports that he is doing ok.  Pain is controlled.  he is not taking pain medication.  he denies fever, chills, and sweats since the time of the surgery.     Physical exam:  Walked into clinic unassisted, incision clean dry intact, appropriate healing, decreased range of motion in all planes, NVI.     RADS: No new fractures/dislocations.  Hardware in place with callous formation.  No signs of hardware loosening/failure    Assessment:  Post-op visit (12 weeks)    Plan:  - he may wash the area with antibacterial soap in the shower. Will not submerge until the incision is completely healed  - PT, [facility], Patient is to make appointment himself,   - range of motion as tolerated,    - weight bearing as tolerated  - will slowly advance activity as tolerated.   - pain medication: no refill needed   - return to clinic in 6 weeks if any increase in pain at time x-ray of his ankle is needed

## 2017-12-15 ENCOUNTER — TELEPHONE (OUTPATIENT)
Dept: ENDOSCOPY | Facility: HOSPITAL | Age: 62
End: 2017-12-15

## 2018-01-09 ENCOUNTER — DOCUMENTATION ONLY (OUTPATIENT)
Dept: REHABILITATION | Facility: HOSPITAL | Age: 63
End: 2018-01-09

## 2018-01-09 PROBLEM — M25.673 DECREASED ROM OF ANKLE: Status: RESOLVED | Noted: 2017-11-02 | Resolved: 2018-01-09

## 2018-01-09 PROBLEM — M25.571 ACUTE RIGHT ANKLE PAIN: Status: RESOLVED | Noted: 2017-11-02 | Resolved: 2018-01-09

## 2018-01-09 PROBLEM — R29.898 ANKLE WEAKNESS: Status: RESOLVED | Noted: 2017-11-02 | Resolved: 2018-01-09

## 2018-01-09 NOTE — PROGRESS NOTES
Pt did not present for scheduled appointments. Pt was seen for 9 visits from 11/2/17 to 12/7/17. Goal achievement unable to be assessed secondary to patient not returning. Pt discharged from plan of care at this time.

## 2018-10-22 ENCOUNTER — OFFICE VISIT (OUTPATIENT)
Dept: INTERNAL MEDICINE | Facility: CLINIC | Age: 63
End: 2018-10-22
Payer: COMMERCIAL

## 2018-10-22 ENCOUNTER — LAB VISIT (OUTPATIENT)
Dept: LAB | Facility: HOSPITAL | Age: 63
End: 2018-10-22
Attending: INTERNAL MEDICINE
Payer: COMMERCIAL

## 2018-10-22 VITALS
WEIGHT: 196 LBS | HEIGHT: 69 IN | SYSTOLIC BLOOD PRESSURE: 182 MMHG | DIASTOLIC BLOOD PRESSURE: 103 MMHG | HEART RATE: 75 BPM | BODY MASS INDEX: 29.03 KG/M2 | RESPIRATION RATE: 18 BRPM | TEMPERATURE: 98 F

## 2018-10-22 DIAGNOSIS — Z12.5 PROSTATE CANCER SCREENING: ICD-10-CM

## 2018-10-22 DIAGNOSIS — Z11.59 NEED FOR HEPATITIS C SCREENING TEST: ICD-10-CM

## 2018-10-22 DIAGNOSIS — E78.49 OTHER HYPERLIPIDEMIA: Chronic | ICD-10-CM

## 2018-10-22 DIAGNOSIS — J32.9 SINUSITIS, UNSPECIFIED CHRONICITY, UNSPECIFIED LOCATION: ICD-10-CM

## 2018-10-22 DIAGNOSIS — R31.9 HEMATURIA, UNSPECIFIED TYPE: Primary | ICD-10-CM

## 2018-10-22 DIAGNOSIS — Z00.00 ANNUAL PHYSICAL EXAM: Primary | ICD-10-CM

## 2018-10-22 DIAGNOSIS — I10 ESSENTIAL HYPERTENSION: Chronic | ICD-10-CM

## 2018-10-22 DIAGNOSIS — Z12.11 SCREEN FOR COLON CANCER: ICD-10-CM

## 2018-10-22 DIAGNOSIS — Z00.00 ANNUAL PHYSICAL EXAM: ICD-10-CM

## 2018-10-22 LAB
ALBUMIN SERPL BCP-MCNC: 3.9 G/DL
ALP SERPL-CCNC: 54 U/L
ALT SERPL W/O P-5'-P-CCNC: 11 U/L
ANION GAP SERPL CALC-SCNC: 7 MMOL/L
AST SERPL-CCNC: 13 U/L
BASOPHILS # BLD AUTO: 0.03 K/UL
BASOPHILS NFR BLD: 0.4 %
BILIRUB SERPL-MCNC: 0.6 MG/DL
BUN SERPL-MCNC: 24 MG/DL
CALCIUM SERPL-MCNC: 9.8 MG/DL
CHLORIDE SERPL-SCNC: 106 MMOL/L
CHOLEST SERPL-MCNC: 156 MG/DL
CHOLEST/HDLC SERPL: 4.6 {RATIO}
CO2 SERPL-SCNC: 28 MMOL/L
COMPLEXED PSA SERPL-MCNC: 1.2 NG/ML
CREAT SERPL-MCNC: 0.9 MG/DL
DIFFERENTIAL METHOD: NORMAL
EOSINOPHIL # BLD AUTO: 0.3 K/UL
EOSINOPHIL NFR BLD: 4.1 %
ERYTHROCYTE [DISTWIDTH] IN BLOOD BY AUTOMATED COUNT: 13.2 %
EST. GFR  (AFRICAN AMERICAN): >60 ML/MIN/1.73 M^2
EST. GFR  (NON AFRICAN AMERICAN): >60 ML/MIN/1.73 M^2
ESTIMATED AVG GLUCOSE: 103 MG/DL
GLUCOSE SERPL-MCNC: 88 MG/DL
HBA1C MFR BLD HPLC: 5.2 %
HCT VFR BLD AUTO: 47.8 %
HCV AB SERPL QL IA: NEGATIVE
HDLC SERPL-MCNC: 34 MG/DL
HDLC SERPL: 21.8 %
HGB BLD-MCNC: 15.3 G/DL
IMM GRANULOCYTES # BLD AUTO: 0.02 K/UL
IMM GRANULOCYTES NFR BLD AUTO: 0.2 %
LDLC SERPL CALC-MCNC: 95.2 MG/DL
LYMPHOCYTES # BLD AUTO: 1.8 K/UL
LYMPHOCYTES NFR BLD: 22.8 %
MCH RBC QN AUTO: 28.1 PG
MCHC RBC AUTO-ENTMCNC: 32 G/DL
MCV RBC AUTO: 88 FL
MONOCYTES # BLD AUTO: 0.6 K/UL
MONOCYTES NFR BLD: 7.8 %
NEUTROPHILS # BLD AUTO: 5.2 K/UL
NEUTROPHILS NFR BLD: 64.7 %
NONHDLC SERPL-MCNC: 122 MG/DL
NRBC BLD-RTO: 0 /100 WBC
PLATELET # BLD AUTO: 161 K/UL
PMV BLD AUTO: 9.4 FL
POTASSIUM SERPL-SCNC: 4.5 MMOL/L
PROT SERPL-MCNC: 7.1 G/DL
RBC # BLD AUTO: 5.44 M/UL
SODIUM SERPL-SCNC: 141 MMOL/L
TRIGL SERPL-MCNC: 134 MG/DL
TSH SERPL DL<=0.005 MIU/L-ACNC: 1.48 UIU/ML
WBC # BLD AUTO: 8.06 K/UL

## 2018-10-22 PROCEDURE — 90686 IIV4 VACC NO PRSV 0.5 ML IM: CPT | Mod: S$GLB,,, | Performed by: INTERNAL MEDICINE

## 2018-10-22 PROCEDURE — 99396 PREV VISIT EST AGE 40-64: CPT | Mod: 25,S$GLB,, | Performed by: INTERNAL MEDICINE

## 2018-10-22 PROCEDURE — 83036 HEMOGLOBIN GLYCOSYLATED A1C: CPT

## 2018-10-22 PROCEDURE — 80061 LIPID PANEL: CPT

## 2018-10-22 PROCEDURE — 84153 ASSAY OF PSA TOTAL: CPT

## 2018-10-22 PROCEDURE — 99999 PR PBB SHADOW E&M-EST. PATIENT-LVL III: CPT | Mod: PBBFAC,,, | Performed by: INTERNAL MEDICINE

## 2018-10-22 PROCEDURE — 84443 ASSAY THYROID STIM HORMONE: CPT

## 2018-10-22 PROCEDURE — 86803 HEPATITIS C AB TEST: CPT

## 2018-10-22 PROCEDURE — 90471 IMMUNIZATION ADMIN: CPT | Mod: S$GLB,,, | Performed by: INTERNAL MEDICINE

## 2018-10-22 PROCEDURE — 85025 COMPLETE CBC W/AUTO DIFF WBC: CPT

## 2018-10-22 PROCEDURE — 36415 COLL VENOUS BLD VENIPUNCTURE: CPT | Mod: PO

## 2018-10-22 PROCEDURE — 80053 COMPREHEN METABOLIC PANEL: CPT

## 2018-10-22 RX ORDER — FLUTICASONE PROPIONATE 50 MCG
1 SPRAY, SUSPENSION (ML) NASAL DAILY
Qty: 16 G | Refills: 11 | Status: SHIPPED | OUTPATIENT
Start: 2018-10-22 | End: 2021-02-23

## 2018-10-22 RX ORDER — BENZONATATE 200 MG/1
200 CAPSULE ORAL 3 TIMES DAILY PRN
Qty: 30 CAPSULE | Refills: 2 | Status: SHIPPED | OUTPATIENT
Start: 2018-10-22 | End: 2018-11-01

## 2018-10-22 RX ORDER — ATORVASTATIN CALCIUM 20 MG/1
20 TABLET, FILM COATED ORAL DAILY
Qty: 90 TABLET | Refills: 3 | Status: SHIPPED | OUTPATIENT
Start: 2018-10-22 | End: 2019-12-04 | Stop reason: SDUPTHER

## 2018-10-22 RX ORDER — HYDROCODONE BITARTRATE AND HOMATROPINE METHYLBROMIDE ORAL SOLUTION 5; 1.5 MG/5ML; MG/5ML
5 LIQUID ORAL NIGHTLY PRN
Qty: 180 ML | Refills: 0 | Status: SHIPPED | OUTPATIENT
Start: 2018-10-22 | End: 2018-10-22 | Stop reason: SDUPTHER

## 2018-10-22 RX ORDER — CITALOPRAM 40 MG/1
40 TABLET, FILM COATED ORAL DAILY
Qty: 90 TABLET | Refills: 3 | Status: SHIPPED | OUTPATIENT
Start: 2018-10-22 | End: 2019-12-04 | Stop reason: SDUPTHER

## 2018-10-22 RX ORDER — LISINOPRIL 40 MG/1
40 TABLET ORAL DAILY
Qty: 30 TABLET | Refills: 11 | Status: SHIPPED | OUTPATIENT
Start: 2018-10-22 | End: 2019-12-04

## 2018-10-22 RX ORDER — HYDROCODONE BITARTRATE AND HOMATROPINE METHYLBROMIDE ORAL SOLUTION 5; 1.5 MG/5ML; MG/5ML
5 LIQUID ORAL NIGHTLY PRN
Qty: 180 ML | Refills: 0 | Status: SHIPPED | OUTPATIENT
Start: 2018-10-22 | End: 2018-11-01

## 2018-10-22 NOTE — TELEPHONE ENCOUNTER
Hycodan unavailale at [facility]. Is available at [address]. Cancelled rx for current pharmacy. Needs sent to [address]

## 2018-10-22 NOTE — PROGRESS NOTES
Subjective:       Patient ID: Anthony Hanna is a 62 y.o. male.    Chief Complaint: Annual Exam    HPI     62 y.o. male here for annual exam.     Cholesterol: needs  Vaccines: Influenza - needs; Tetanus - 2017; Zoster - needs  Sexual Screening:   STD screening: no concern  Eye exam:  Done last year  Prostate: needs  Colonoscopy: needs  A1c: needs    Exercise:  No regular exercise.  Diet:  Home cooked.  Drinks water, coffee, tea.    He reports that he has a cough and stuffy sinuses.  He is not really headachy.  He is taking nyquil and dayquil.  The cough is keeping him up at night.  No fevers or chills.    Past Medical History:   Diagnosis Date    Ankle fracture     HLD (hyperlipidemia)     Nuclear sclerosis - Both Eyes 04/16/2013    patient states he is not familiar with this diagnosis      Past Surgical History:   Procedure Laterality Date    OPEN REDUCTION INTERNAL FIXATION-ANKLE - right - c-arm on left Right 9/13/2017    Performed by Florentino Garcia MD at [facility] OR [address] [place] [address]     Social History     Socioeconomic History    Marital status:      Spouse name: Not on file    Number of children: Not on file    Years of education: Not on file    Highest education level: Not on file   Social Needs    Financial resource strain: Not on file    Food insecurity - worry: Not on file    Food insecurity - inability: Not on file    Transportation needs - medical: Not on file    Transportation needs - non-medical: Not on file   Occupational History    Not on file   Tobacco Use    Smoking status: Former Smoker     Packs/day: 0.25     Types: Cigarettes    Smokeless tobacco: Never Used    Tobacco comment: quit October 2018   Substance and Sexual Activity    Alcohol use: Yes     Comment: couple beers a week    Drug use: No    Sexual activity: Yes     Partners: Female     Comment:    Other Topics Concern    Not on file   Social History Narrative    Not on file     Review of patient's allergies  indicates:   Allergen Reactions    Pcn [penicillins] Anaphylaxis     Anthony [facility] had no medications administered during this visit.    Review of Systems    Objective:      Physical Exam   Constitutional: He is oriented to person, place, and time. He appears well-developed and well-nourished.   HENT:   Head: Normocephalic and atraumatic.   Mouth/Throat: No oropharyngeal exudate.   Eyes: EOM are normal. Pupils are equal, round, and reactive to light. Right eye exhibits no discharge. Left eye exhibits no discharge. No scleral icterus.   Neck: Normal range of motion. Neck supple. No tracheal deviation present. No thyromegaly present.   Cardiovascular: Normal rate, regular rhythm and normal heart sounds. Exam reveals no gallop and no friction rub.   No murmur heard.  Pulmonary/Chest: Effort normal and breath sounds normal. No respiratory distress. He has no wheezes. He has no rales. He exhibits no tenderness.   Abdominal: Soft. Bowel sounds are normal. He exhibits no distension and no mass. There is no tenderness. There is no rebound and no guarding.   Musculoskeletal: Normal range of motion. He exhibits no edema or tenderness.   Neurological: He is alert and oriented to person, place, and time.   Skin: Skin is warm and dry. No rash noted. No erythema. No pallor.   Psychiatric: He has a normal mood and affect. His behavior is normal.   Vitals reviewed.      Assessment:       1. Annual physical exam    2. Other hyperlipidemia    3. Essential hypertension    4. Screen for colon cancer    5. Prostate cancer screening    6. Sinusitis, unspecified chronicity, unspecified location    7. Need for hepatitis C screening test        Plan:       1.  Check CBC, CMP, TSH, lipids, A1c, PSA.  Discussed exercise with patient.  Encouraged exercise 5 days a week, 30 min a day.  Flu vaccine given today.  Shingles vaccine ordered.  2.  Continue Lipitor 20 mg daily.  3.  Start lisinopril 40 mg daily.  Return to clinic in a month to  reassess.  4.  Colonoscopy.  5.  PSA.  6.  Likely viral.  Counseled patient to treat symptomatically.  Prescribed Tessalon Perles, Flonase, Hycodan.  If no improvement, call back.  7.  Check hep C antibody.

## 2018-10-22 NOTE — TELEPHONE ENCOUNTER
There is some blood in urine.  We need to repeat a urinalysis in a week.  Released to patient portal.

## 2018-11-08 ENCOUNTER — PATIENT MESSAGE (OUTPATIENT)
Dept: INTERNAL MEDICINE | Facility: CLINIC | Age: 63
End: 2018-11-08

## 2018-11-08 DIAGNOSIS — Z12.11 SPECIAL SCREENING FOR MALIGNANT NEOPLASMS, COLON: Primary | ICD-10-CM

## 2018-11-08 RX ORDER — SODIUM, POTASSIUM,MAG SULFATES 17.5-3.13G
SOLUTION, RECONSTITUTED, ORAL ORAL
Qty: 1 KIT | Refills: 0 | Status: ON HOLD | OUTPATIENT
Start: 2018-11-08 | End: 2018-12-11 | Stop reason: HOSPADM

## 2018-12-11 ENCOUNTER — ANESTHESIA EVENT (OUTPATIENT)
Dept: ENDOSCOPY | Facility: HOSPITAL | Age: 63
End: 2018-12-11
Payer: COMMERCIAL

## 2018-12-11 ENCOUNTER — ANESTHESIA (OUTPATIENT)
Dept: ENDOSCOPY | Facility: HOSPITAL | Age: 63
End: 2018-12-11
Payer: COMMERCIAL

## 2018-12-11 ENCOUNTER — HOSPITAL ENCOUNTER (OUTPATIENT)
Facility: HOSPITAL | Age: 63
Discharge: HOME | End: 2018-12-11
Attending: COLON & RECTAL SURGERY | Admitting: COLON & RECTAL SURGERY
Payer: COMMERCIAL

## 2018-12-11 VITALS
HEART RATE: 66 BPM | TEMPERATURE: 98 F | RESPIRATION RATE: 18 BRPM | WEIGHT: 185 LBS | SYSTOLIC BLOOD PRESSURE: 145 MMHG | HEIGHT: 69 IN | OXYGEN SATURATION: 97 % | BODY MASS INDEX: 27.4 KG/M2 | DIASTOLIC BLOOD PRESSURE: 93 MMHG

## 2018-12-11 DIAGNOSIS — Z12.9 SCREENING FOR CANCER: ICD-10-CM

## 2018-12-11 PROCEDURE — 25000003 PHARM REV CODE 250: Performed by: COLON & RECTAL SURGERY

## 2018-12-11 PROCEDURE — E9220 PRA ENDO ANESTHESIA: HCPCS | Mod: ,,, | Performed by: NURSE ANESTHETIST, CERTIFIED REGISTERED

## 2018-12-11 PROCEDURE — 25000003 PHARM REV CODE 250: Performed by: NURSE ANESTHETIST, CERTIFIED REGISTERED

## 2018-12-11 PROCEDURE — G0121 COLON CA SCRN NOT HI RSK IND: HCPCS | Mod: ,,, | Performed by: COLON & RECTAL SURGERY

## 2018-12-11 PROCEDURE — 37000008 HC ANESTHESIA 1ST 15 MINUTES: Performed by: COLON & RECTAL SURGERY

## 2018-12-11 PROCEDURE — 63600175 PHARM REV CODE 636 W HCPCS: Performed by: NURSE ANESTHETIST, CERTIFIED REGISTERED

## 2018-12-11 PROCEDURE — G0121 COLON CA SCRN NOT HI RSK IND: HCPCS | Performed by: COLON & RECTAL SURGERY

## 2018-12-11 PROCEDURE — 37000009 HC ANESTHESIA EA ADD 15 MINS: Performed by: COLON & RECTAL SURGERY

## 2018-12-11 RX ORDER — GLYCOPYRROLATE 0.2 MG/ML
INJECTION INTRAMUSCULAR; INTRAVENOUS
Status: DISCONTINUED | OUTPATIENT
Start: 2018-12-11 | End: 2018-12-11

## 2018-12-11 RX ORDER — SODIUM CHLORIDE 9 MG/ML
INJECTION, SOLUTION INTRAVENOUS CONTINUOUS
Status: DISCONTINUED | OUTPATIENT
Start: 2018-12-11 | End: 2018-12-11 | Stop reason: HOSPADM

## 2018-12-11 RX ORDER — LIDOCAINE HCL/PF 100 MG/5ML
SYRINGE (ML) INTRAVENOUS
Status: DISCONTINUED | OUTPATIENT
Start: 2018-12-11 | End: 2018-12-11

## 2018-12-11 RX ORDER — PROPOFOL 10 MG/ML
VIAL (ML) INTRAVENOUS CONTINUOUS PRN
Status: DISCONTINUED | OUTPATIENT
Start: 2018-12-11 | End: 2018-12-11

## 2018-12-11 RX ORDER — PROPOFOL 10 MG/ML
VIAL (ML) INTRAVENOUS
Status: DISCONTINUED | OUTPATIENT
Start: 2018-12-11 | End: 2018-12-11

## 2018-12-11 RX ADMIN — PROPOFOL 20 MG: 10 INJECTION, EMULSION INTRAVENOUS at 07:12

## 2018-12-11 RX ADMIN — SODIUM CHLORIDE: 0.9 INJECTION, SOLUTION INTRAVENOUS at 08:12

## 2018-12-11 RX ADMIN — PROPOFOL 200 MCG/KG/MIN: 10 INJECTION, EMULSION INTRAVENOUS at 07:12

## 2018-12-11 RX ADMIN — SODIUM CHLORIDE: 0.9 INJECTION, SOLUTION INTRAVENOUS at 07:12

## 2018-12-11 RX ADMIN — LIDOCAINE HYDROCHLORIDE 50 MG: 20 INJECTION, SOLUTION INTRAVENOUS at 07:12

## 2018-12-11 RX ADMIN — GLYCOPYRROLATE 0.2 MG: 0.2 INJECTION, SOLUTION INTRAMUSCULAR; INTRAVENOUS at 07:12

## 2018-12-11 RX ADMIN — PROPOFOL 70 MG: 10 INJECTION, EMULSION INTRAVENOUS at 07:12

## 2018-12-11 RX ADMIN — PROPOFOL 10 MG: 10 INJECTION, EMULSION INTRAVENOUS at 07:12

## 2018-12-11 NOTE — ANESTHESIA PREPROCEDURE EVALUATION
12/11/2018  Anthony Hanna is a 62 y.o., male.    Anesthesia Evaluation    I have reviewed the Patient Summary Reports.    I have reviewed the Nursing Notes.   I have reviewed the Medications.     Review of Systems  Anesthesia Hx:  No problems with previous Anesthesia Denies Hx of Anesthetic complications  Neg history of prior surgery. Denies Family Hx of Anesthesia complications.   Denies Personal Hx of Anesthesia complications.   Social:  Former Smoker    Hematology/Oncology:  Hematology Normal   Oncology Normal     EENT/Dental:EENT/Dental Normal   Cardiovascular:   Hypertension    Pulmonary:  Pulmonary Normal    Renal/:  Renal/ Normal     Hepatic/GI:  Hepatic/GI Normal    Musculoskeletal:  Musculoskeletal Normal    Neurological:  Neurology Normal    Endocrine:  Endocrine Normal    Dermatological:  Skin Normal    Psych:   anxiety          Physical Exam  General:  Well nourished    Airway/Jaw/Neck:  Airway Findings: Mouth Opening: Normal Tongue: Normal  General Airway Assessment: Adult, Good  Mallampati: I  Jaw/Neck Findings:  Neck ROM: Normal ROM     Eyes/Ears/Nose:  EYES/EARS/NOSE FINDINGS: Normal   Dental:  Dental Findings: In tact   Chest/Lungs:  Chest/Lungs Clear    Heart/Vascular:  Heart Findings: Normal Heart murmur: negative Vascular Findings: Normal    Abdomen:  Abdomen Findings: Normal    Musculoskeletal:  Musculoskeletal Findings: Normal   Skin:  Skin Findings: Normal    Mental Status:  Mental Status Findings: Normal        Anesthesia Plan  Type of Anesthesia, risks & benefits discussed:  Anesthesia Type:  general  Patient's Preference: general  Intra-op Monitoring Plan: standard ASA monitors  Intra-op Monitoring Plan Comments:   Post Op Pain Control Plan:   Post Op Pain Control Plan Comments:   Induction:   IV  Beta Blocker:  Patient is not currently on a Beta-Blocker (No further  documentation required).       Informed Consent: Patient understands risks and agrees with Anesthesia plan.  Questions answered. Anesthesia consent signed with patient.  ASA Score: 2     Day of Surgery Review of History & Physical: I have interviewed and examined the patient. I have reviewed the patient's H&P dated:  There are no significant changes.  H&P update referred to the provider.         Ready For Surgery From Anesthesia Perspective.

## 2018-12-11 NOTE — [employer] PATIENT INSTRUCTIONS
Discharge Summary/Instructions after an Endoscopic Procedure  Patient Name: Anthony Hanna  Patient MRN: [id number]  Patient YOB: 1955 Tuesday, December 11, 2018  Jose Fernandez MD  RESTRICTIONS:  During your procedure today, you received medications for sedation.  These   medications may affect your judgment, balance and coordination.  Therefore,   for 24 hours, you have the following restrictions:   - DO NOT drive a car, operate machinery, make legal/financial decisions,   sign important papers or drink alcohol.    ACTIVITY:  Today: no heavy lifting, straining or running due to procedural   sedation/anesthesia.  The following day: return to full activity including work.  DIET:  Eat and drink normally unless instructed otherwise.     TREATMENT FOR COMMON SIDE EFFECTS:  - Mild abdominal pain, nausea, belching, bloating or excessive gas:  rest,   eat lightly and use a heating pad.  - Sore Throat: treat with throat lozenges and/or gargle with warm salt   water.  - Because air was used during the procedure, expelling large amounts of air   from your rectum or belching is normal.  - If a bowel prep was taken, you may not have a bowel movement for 1-3 days.    This is normal.  SYMPTOMS TO WATCH FOR AND REPORT TO YOUR PHYSICIAN:  1. Abdominal pain or bloating, other than gas cramps.  2. Chest pain.  3. Back pain.  4. Signs of infection such as: chills or fever occurring within 24 hours   after the procedure.  5. Rectal bleeding, which would show as bright red, maroon, or black stools.   (A tablespoon of blood from the rectum is not serious, especially if   hemorrhoids are present.)  6. Vomiting.  7. Weakness or dizziness.  GO DIRECTLY TO THE NEAREST EMERGENCY ROOM IF YOU HAVE ANY OF THE FOLLOWING:      Difficulty breathing              Chills and/or fever over 101 F   Persistent vomiting and/or vomiting blood   Severe abdominal pain   Severe chest pain   Black, tarry stools   Bleeding- more than one  tablespoon   Any other symptom or condition that you feel may need urgent attention  Your doctor recommends these additional instructions:  If any biopsies were taken, your doctors clinic will contact you in 1 to 2   weeks with any results.  - Discharge patient to home (ambulatory).   - Patient has a contact number available for emergencies.  The signs and   symptoms of potential delayed complications were discussed with the   patient.  Return to normal activities tomorrow.  Written discharge   instructions were provided to the patient.   - Resume previous diet.   - Continue present medications.   - Repeat colonoscopy in 10 years for screening purposes.  For questions, problems or results please call your physician - Jose Fernandez MD at Work:  [phone].  [facility], EMERGENCY ROOM PHONE NUMBER: [phone]  IF A COMPLICATION OR EMERGENCY SITUATION ARISES AND YOU ARE UNABLE TO REACH   YOUR PHYSICIAN - GO DIRECTLY TO THE EMERGENCY ROOM.  Jose Fernandez MD  12/11/2018 8:02:52 AM  This report has been verified and signed electronically.  [employer]

## 2018-12-11 NOTE — H&P
Endoscopy H&P    Procedure : Colonoscopy      asymptomatic screening exam and most recent endoscopic exam 14 years ago. Denies bleeding, change in bowels.    Family history of CRC: none    Past Medical History:   Diagnosis Date    Ankle fracture     Essential hypertension     HLD (hyperlipidemia)     Nuclear sclerosis - Both Eyes 04/16/2013    patient states he is not familiar with this diagnosis              Review of Systems -ROS:  GENERAL: No fever, chills, fatigability or weight loss.  CHEST: Denies YO, cyanosis, wheezing, cough and sputum production.  CARDIOVASCULAR: Denies chest pain, PND, orthopnea or reduced exercise tolerance.   Musculoskeletal ROS: negative for - gait disturbance or joint pain  Neurological ROS: negative for - confusion or memory loss        Physical Exam:  General: well developed, well nourished, no distress  Head: normocephalic  Neck: supple, symmetrical, trachea midline  Lungs:  clear to auscultation bilaterally and normal respiratory effort  Heart: regular rate and rhythm, S1, S2 normal, no murmur, rub or gallop and regular rate and rhythm  Abdomen: soft, non-tender non-distented; bowel sounds normal; no masses,  no organomegaly  Extremities: no cyanosis or edema, or clubbing       Moderate Sedation (choice): Mallampati Score 1    ASA : II    IMP: asymptomatic screening exam    Plan: Colonoscopy with Moderate sedation.  I have explained the procedure including indications, alternatives, expected outcomes and potential complications. The patient appears to understand and gives informed consent. The patient is medically ready for surgery.

## 2018-12-11 NOTE — DISCHARGE INSTRUCTIONS
Colonoscopy     A camera attached to a flexible tube with a viewing lens is used to take video pictures.     Colonoscopy is a test to view the inside of your lower digestive tract (colon and rectum). Sometimes it can show the last part of the small intestine (ileum). During the test, small pieces of tissue may be removed for testing. This is called a biopsy. Small growths, such as polyps, may also be removed.   Why is colonoscopy done?  The test is done to help look for colon cancer. And it can help find the source of abdominal pain, bleeding, and changes in bowel habits. It may be needed once a year, depending on factors such as your:  · Age  · Health history  · Family health history  · Symptoms  · Results from any prior colonoscopy  Risks and possible complications  These include:  · Bleeding               · A puncture or tear in the colon   · Risks of anesthesia  · A cancer lesion not being seen  Getting ready   To prepare for the test:  · Talk with your healthcare provider about the risks of the test (see below). Also ask your healthcare provider about alternatives to the test.  · Tell your healthcare provider about any medicines you take. Also tell him or her about any health conditions you may have.  · Make sure your rectum and colon are empty for the test. Follow the diet and bowel prep instructions exactly. If you dont, the test may need to be rescheduled.  · Plan for a friend or family member to drive you home after the test.     Colonoscopy provides an inside view of the entire colon.     You may discuss the results with your doctor right away or at a future visit.  During the test   The test is usually done in the hospital on an outpatient basis. This means you go home the same day. The procedure takes about 30 minutes. During that time:  · You are given relaxing (sedating) medicine through an IV line. You may be drowsy, or fully asleep.  · The healthcare provider will first give you a physical exam to  check for anal and rectal problems.  · Then the anus is lubricated and the scope inserted.  · If you are awake, you may have a feeling similar to needing to have a bowel movement. You may also feel pressure as air is pumped into the colon. Its OK to pass gas during the procedure.  · Biopsy, polyp removal, or other treatments may be done during the test.  After the test   You may have gas right after the test. It can help to try to pass it to help prevent later bloating. Your healthcare provider may discuss the results with you right away. Or you may need to schedule a follow-up visit to talk about the results. After the test, you can go back to your normal eating and other activities. You may be tired from the sedation and need to rest for a few hours.  Date Last Reviewed: 11/1/2016 © 2000-2017 [employer]. [address] [place] [address], [place] [address]. All rights reserved. This information is not intended as a substitute for professional medical care. Always follow your healthcare professional's instructions.

## 2018-12-11 NOTE — TRANSFER OF CARE
"Anesthesia Transfer of Care Note    Patient: Anthony Hanna    Procedure(s) Performed: Procedure(s) (LRB):  COLONOSCOPY (N/A)    Patient location: GI    Anesthesia Type: general    Transport from OR: Transported from OR on 6-10 L/min O2 by face mask with adequate spontaneous ventilation    Post pain: adequate analgesia    Post assessment: no apparent anesthetic complications and tolerated procedure well    Post vital signs: stable    Level of consciousness: sedated    Nausea/Vomiting: no nausea/vomiting    Complications: none    Transfer of care protocol was followed      Last vitals:   Visit Vitals  BP (!) 144/83 (BP Location: Left arm, Patient Position: Lying)   Pulse 70   Temp 37 °C (98.6 °F) (Temporal)   Resp 18   Ht 5' 9" (1.753 m)   Wt 83.9 kg (185 lb)   SpO2 96%   BMI 27.32 kg/m²     "

## 2018-12-11 NOTE — ANESTHESIA POSTPROCEDURE EVALUATION
"Anesthesia Post Evaluation    Patient: Anthony Hanna    Procedure(s) Performed: Procedure(s) (LRB):  COLONOSCOPY (N/A)    Final Anesthesia Type: general  Patient location during evaluation: PACU  Patient participation: Yes- Able to Participate  Level of consciousness: awake and alert  Post-procedure vital signs: reviewed and stable  Pain management: adequate  Airway patency: patent  PONV status at discharge: No PONV  Anesthetic complications: no      Cardiovascular status: hemodynamically stable  Respiratory status: room air, spontaneous ventilation and unassisted  Hydration status: euvolemic  Follow-up not needed.        Visit Vitals  BP (!) 154/87 (BP Location: Left arm, Patient Position: Sitting)   Pulse 74   Temp 36.4 °C (97.5 °F) (Temporal)   Resp 18   Ht 5' 9" (1.753 m)   Wt 83.9 kg (185 lb)   SpO2 98%   BMI 27.32 kg/m²       Pain/Juvenal Score: Juvenal Score: 9 (12/11/2018  8:04 AM)        "

## 2018-12-18 ENCOUNTER — TELEPHONE (OUTPATIENT)
Dept: ENDOSCOPY | Facility: HOSPITAL | Age: 63
End: 2018-12-18

## 2019-09-18 ENCOUNTER — OFFICE VISIT (OUTPATIENT)
Dept: OPTOMETRY | Facility: CLINIC | Age: 64
End: 2019-09-18
Payer: COMMERCIAL

## 2019-09-18 DIAGNOSIS — H52.203 MYOPIA WITH ASTIGMATISM AND PRESBYOPIA, BILATERAL: ICD-10-CM

## 2019-09-18 DIAGNOSIS — H52.4 MYOPIA WITH ASTIGMATISM AND PRESBYOPIA, BILATERAL: ICD-10-CM

## 2019-09-18 DIAGNOSIS — H52.13 MYOPIA WITH ASTIGMATISM AND PRESBYOPIA, BILATERAL: ICD-10-CM

## 2019-09-18 DIAGNOSIS — H25.13 NUCLEAR SCLEROSIS, BILATERAL: Primary | ICD-10-CM

## 2019-09-18 PROCEDURE — 92015 PR REFRACTION: ICD-10-PCS | Mod: S$GLB,,, | Performed by: OPTOMETRIST

## 2019-09-18 PROCEDURE — 99999 PR PBB SHADOW E&M-EST. PATIENT-LVL II: CPT | Mod: PBBFAC,,, | Performed by: OPTOMETRIST

## 2019-09-18 PROCEDURE — 92015 DETERMINE REFRACTIVE STATE: CPT | Mod: S$GLB,,, | Performed by: OPTOMETRIST

## 2019-09-18 PROCEDURE — 92014 COMPRE OPH EXAM EST PT 1/>: CPT | Mod: S$GLB,,, | Performed by: OPTOMETRIST

## 2019-09-18 PROCEDURE — 99999 PR PBB SHADOW E&M-EST. PATIENT-LVL II: ICD-10-PCS | Mod: PBBFAC,,, | Performed by: OPTOMETRIST

## 2019-09-18 PROCEDURE — 92014 PR EYE EXAM, EST PATIENT,COMPREHESV: ICD-10-PCS | Mod: S$GLB,,, | Performed by: OPTOMETRIST

## 2019-09-18 NOTE — PROGRESS NOTES
DIPAK WATSON 09/2018  No complaints  Wants new glasses      Last edited by Magdy Bull, OD on 9/18/2019  2:57 PM. (History)            Assessment /Plan     For exam results, see Encounter Report.    Nuclear sclerosis, bilateral    Myopia with astigmatism and presbyopia, bilateral      1. Educated pt on presence of cataracts and effects on vision. No surgery at this time. Recheck in one year.  2. New Spec Rx given. Different lens options discussed with patient. RTC 1 year full exam.

## 2019-10-17 ENCOUNTER — PATIENT MESSAGE (OUTPATIENT)
Dept: INTERNAL MEDICINE | Facility: CLINIC | Age: 64
End: 2019-10-17

## 2019-10-17 DIAGNOSIS — F32.A DEPRESSION, UNSPECIFIED DEPRESSION TYPE: Primary | ICD-10-CM

## 2019-10-28 ENCOUNTER — CLINICAL SUPPORT (OUTPATIENT)
Dept: SMOKING CESSATION | Facility: CLINIC | Age: 64
End: 2019-10-28
Payer: COMMERCIAL

## 2019-10-28 DIAGNOSIS — F17.200 NICOTINE DEPENDENCE: Primary | ICD-10-CM

## 2019-10-28 PROCEDURE — 99404 PREV MED CNSL INDIV APPRX 60: CPT | Mod: S$GLB,,,

## 2019-10-28 PROCEDURE — 99999 PR PBB SHADOW E&M-EST. PATIENT-LVL II: CPT | Mod: PBBFAC,,,

## 2019-10-28 PROCEDURE — 99404 PR PREVENT COUNSEL,INDIV,60 MIN: ICD-10-PCS | Mod: S$GLB,,,

## 2019-10-28 PROCEDURE — 99999 PR PBB SHADOW E&M-EST. PATIENT-LVL II: ICD-10-PCS | Mod: PBBFAC,,,

## 2019-10-28 RX ORDER — VARENICLINE TARTRATE 0.5 (11)-1
KIT ORAL
Qty: 53 TABLET | Refills: 0 | Status: SHIPPED | OUTPATIENT
Start: 2019-10-28 | End: 2019-12-02 | Stop reason: ALTCHOICE

## 2019-10-28 NOTE — Clinical Note
Patient will be participating in weekly tobacco cessation meetings and will begin the prescribed tobacco cessation medication regime of the Chantix starter pack. Patient has used Chantix before.  FTND of 1 indicates mild dependence to tobacco.  LEOPOLDO-D scale of 13, percieved as no mental distress ofr depression at this time.

## 2019-11-05 ENCOUNTER — CLINICAL SUPPORT (OUTPATIENT)
Dept: SMOKING CESSATION | Facility: CLINIC | Age: 64
End: 2019-11-05
Payer: COMMERCIAL

## 2019-11-05 DIAGNOSIS — F17.210 LIGHT CIGARETTE SMOKER (1-9 CIGARETTES PER DAY): Primary | ICD-10-CM

## 2019-11-05 PROCEDURE — 99999 PR PBB SHADOW E&M-EST. PATIENT-LVL I: ICD-10-PCS | Mod: PBBFAC,,,

## 2019-11-05 PROCEDURE — 99402 PREV MED CNSL INDIV APPRX 30: CPT | Mod: S$GLB,,,

## 2019-11-05 PROCEDURE — 99402 PR PREVENT COUNSEL,INDIV,30 MIN: ICD-10-PCS | Mod: S$GLB,,,

## 2019-11-05 PROCEDURE — 99999 PR PBB SHADOW E&M-EST. PATIENT-LVL I: CPT | Mod: PBBFAC,,,

## 2019-11-05 NOTE — Clinical Note
Just a note to advise how the patient is progressing in the tobacco cessation program. The patient started his Chantix starter packet on 11/4/19 and is not experiencing any negative side effects at this time. The patient will begin the habit modification techniques on 11/6/19. The patient discussed he is going to see a psychologist due to feeling his depression is getting worse over the last few weeks.

## 2019-11-05 NOTE — PROGRESS NOTES
Individual Follow-Up Form    11/5/2019    Quit Date:     Clinical Status of Patient: Outpatient    Length of Service: 30 minutes    Continuing Medication: yes  Chantix    Other Medications:      Target Symptoms: Withdrawal and medication side effects. The following were  rated moderate (3) to severe (4) on TCRS:  · Moderate (3): none  · Severe (4): none    Comments:  orientation, client introductions, completion of TCRS (Tobacco Cessation Rating Scale) learned addiction model, cues/triggers, personal reasons for quitting, medications, goals, quit date. The patient started his Chantix starter packet on 11/4/19 and is not experiencing any negative side effects at this time. The patient will begin the habit modification techniques on 11/6/19. The patient discussed he is going to see a psychologist due to feeling his depression is getting worse over the last few weeks. The patient will continue with group therapy sessions and medication monitoring by CTTS. Prescribed medication management will be by physician.     Diagnosis: F17.210    Next Visit: 1 week

## 2019-11-18 ENCOUNTER — CLINICAL SUPPORT (OUTPATIENT)
Dept: SMOKING CESSATION | Facility: CLINIC | Age: 64
End: 2019-11-18
Payer: COMMERCIAL

## 2019-11-18 DIAGNOSIS — F17.210 LIGHT CIGARETTE SMOKER (1-9 CIGARETTES PER DAY): Primary | ICD-10-CM

## 2019-11-18 PROCEDURE — 90853 PR GROUP PSYCHOTHERAPY: ICD-10-PCS | Mod: S$GLB,,,

## 2019-11-18 PROCEDURE — 99999 PR PBB SHADOW E&M-EST. PATIENT-LVL I: CPT | Mod: PBBFAC,,,

## 2019-11-18 PROCEDURE — 90853 GROUP PSYCHOTHERAPY: CPT | Mod: S$GLB,,,

## 2019-11-18 PROCEDURE — 99999 PR PBB SHADOW E&M-EST. PATIENT-LVL I: ICD-10-PCS | Mod: PBBFAC,,,

## 2019-11-18 NOTE — Clinical Note
Just a note to advise how the patient is progressing in the tobacco cessation program. The patient is smoking 8 cigarettes per day and has started the habit modification techniques with success by reducing tobacco use. CO 14 ppm with the last cigarette being 1 hour prior and commended patient on this reduction also; because last week patient blew a CO of 9 ppm with the last cigarette being smoked over 12 hours prior. The patient remains on the prescribed tobacco cessation medication regimen of 1 mg Chantix BID without any negative side effects at this time.

## 2019-11-18 NOTE — PROGRESS NOTES
Smoking Cessation Group Session #3    Site: [facility]  Date:  11/18/2019  Clinical Status of Patient: Outpatient   Length of Service and Code: 60 minutes - 31125   Number in Attendance: 2  Group Activities/Focus of Group:  completion of TCRS (Tobacco Cessation Rating Scale) reviewed strategies, controlling environment, cues, triggers, new goals set. Introduced high risk situations with preparation interventions, caffeine similarities with withdrawal issues of habit and nicotine, Alcohol, Understanding urges, cravings, stress and relaxation. Open discussion with intervention discussion.    Target symptoms:  withdrawal and medication side effects             The following were rated moderate (3) to severe (4) on TCRS:       Moderate 3: none     Severe 4:   none  Patient's Response to Intervention: The patient is smoking 8 cigarettes per day and has started the habit modification techniques with success by reducing tobacco use. CO 14 ppm with the last cigarette being 1 hour prior and commended patient on this reduction also; because last week patient blew a CO of 9 ppm with the last cigarette being smoked over 12 hours prior. The patient remains on the prescribed tobacco cessation medication regimen of 1 mg Chantix BID without any negative side effects at this time.   Progress Toward Goals and Other Mental Status Changes: The patient denies any abnormal behavioral or mental changes at this time.     Diagnosis: Z72.0  Plan: The patient will continue with group therapy sessions and medication monitoring by CTTS. Prescribed medication management will be by physician.   Return to Clinic: 1 week    Quit Date:    Planned Quit Date:

## 2019-11-25 ENCOUNTER — CLINICAL SUPPORT (OUTPATIENT)
Dept: SMOKING CESSATION | Facility: CLINIC | Age: 64
End: 2019-11-25
Payer: COMMERCIAL

## 2019-11-25 DIAGNOSIS — F17.210 LIGHT CIGARETTE SMOKER (1-9 CIGARETTES PER DAY): Primary | ICD-10-CM

## 2019-11-25 PROCEDURE — 99406 BEHAV CHNG SMOKING 3-10 MIN: CPT | Mod: S$GLB,,,

## 2019-11-25 PROCEDURE — 99406 PR TOBACCO USE CESSATION INTERMEDIATE 3-10 MINUTES: ICD-10-PCS | Mod: S$GLB,,,

## 2019-11-25 PROCEDURE — 99999 PR PBB SHADOW E&M-EST. PATIENT-LVL I: CPT | Mod: PBBFAC,,,

## 2019-11-25 PROCEDURE — 99999 PR PBB SHADOW E&M-EST. PATIENT-LVL I: ICD-10-PCS | Mod: PBBFAC,,,

## 2019-12-02 ENCOUNTER — CLINICAL SUPPORT (OUTPATIENT)
Dept: SMOKING CESSATION | Facility: CLINIC | Age: 64
End: 2019-12-02
Payer: COMMERCIAL

## 2019-12-02 DIAGNOSIS — F17.210 LIGHT CIGARETTE SMOKER (1-9 CIGARETTES PER DAY): Primary | ICD-10-CM

## 2019-12-02 PROCEDURE — 90853 GROUP PSYCHOTHERAPY: CPT | Mod: S$GLB,,,

## 2019-12-02 PROCEDURE — 99999 PR PBB SHADOW E&M-EST. PATIENT-LVL I: ICD-10-PCS | Mod: PBBFAC,,,

## 2019-12-02 PROCEDURE — 99999 PR PBB SHADOW E&M-EST. PATIENT-LVL I: CPT | Mod: PBBFAC,,,

## 2019-12-02 PROCEDURE — 90853 PR GROUP PSYCHOTHERAPY: ICD-10-PCS | Mod: S$GLB,,,

## 2019-12-02 RX ORDER — VARENICLINE TARTRATE 1 MG/1
1 TABLET, FILM COATED ORAL 2 TIMES DAILY
Qty: 58 TABLET | Refills: 0 | Status: SHIPPED | OUTPATIENT
Start: 2019-12-02 | End: 2020-12-01 | Stop reason: SDUPTHER

## 2019-12-02 NOTE — Clinical Note
Just a note to advise how the patient is progressing in the tobacco cessation program. The patient is smoking 8 cigarettes per day and expressed some frustration about not being able to remove the thought to smoke from his mind when he is upset. Additional habit modification techniques were discussed and reviewed to aid the patient. The patient agreed to a reduction to 5 cigarettes once he is able to start the habit modification techniques discussed. The patient remains on the prescribed tobacco cessation medication regimen of 1 mg Chantix BID without any negative side effects at this time.

## 2019-12-02 NOTE — PROGRESS NOTES
Smoking Cessation Group Session #3    Site: [facility]  Date:  12/2/2019  Clinical Status of Patient: Outpatient   Length of Service and Code: 60 minutes - 37517   Number in Attendance: 2  Group Activities/Focus of Group:  completion of TCRS (Tobacco Cessation Rating Scale) reviewed strategies, controlling environment, cues, triggers, new goals set. Introduced high risk situations with preparation interventions, caffeine similarities with withdrawal issues of habit and nicotine, Alcohol, Understanding urges, cravings, stress and relaxation. Open discussion with intervention discussion.    Target symptoms:  withdrawal and medication side effects             The following were rated moderate (3) to severe (4) on TCRS:       Moderate 3: none     Severe 4:  none  Patient's Response to Intervention: The patient is smoking 8 cigarettes per day and expressed some frustration about not being able to remove the thought to smoke from his mind when he is upset. Additional habit modification techniques were discussed and reviewed to aid the patient. The patient agreed to a reduction to 5 cigarettes once he is able to start the habit modification techniques discussed. The patient remains on the prescribed tobacco cessation medication regimen of 1 mg Chantix BID without any negative side effects at this time.   Progress Toward Goals and Other Mental Status Changes: The patient denies any abnormal behavioral or mental changes at this time.     Diagnosis: Z72.0  Plan: The patient will continue with group therapy sessions and medication monitoring by CTTS. Prescribed medication management will be by physician.   Return to Clinic: 1 week    Quit Date:    Planned Quit Date:

## 2019-12-04 ENCOUNTER — OFFICE VISIT (OUTPATIENT)
Dept: INTERNAL MEDICINE | Facility: CLINIC | Age: 64
End: 2019-12-04
Payer: MEDICARE

## 2019-12-04 VITALS
HEART RATE: 94 BPM | DIASTOLIC BLOOD PRESSURE: 102 MMHG | TEMPERATURE: 98 F | RESPIRATION RATE: 18 BRPM | WEIGHT: 190.94 LBS | SYSTOLIC BLOOD PRESSURE: 170 MMHG | OXYGEN SATURATION: 95 % | HEIGHT: 69 IN | BODY MASS INDEX: 28.28 KG/M2

## 2019-12-04 DIAGNOSIS — M25.511 CHRONIC RIGHT SHOULDER PAIN: ICD-10-CM

## 2019-12-04 DIAGNOSIS — I10 ESSENTIAL HYPERTENSION: Chronic | ICD-10-CM

## 2019-12-04 DIAGNOSIS — E78.49 OTHER HYPERLIPIDEMIA: Chronic | ICD-10-CM

## 2019-12-04 DIAGNOSIS — R10.9 RIGHT FLANK PAIN: ICD-10-CM

## 2019-12-04 DIAGNOSIS — F32.A DEPRESSION, UNSPECIFIED DEPRESSION TYPE: Chronic | ICD-10-CM

## 2019-12-04 DIAGNOSIS — M75.41 IMPINGEMENT SYNDROME OF RIGHT SHOULDER: ICD-10-CM

## 2019-12-04 DIAGNOSIS — G89.29 CHRONIC RIGHT SHOULDER PAIN: ICD-10-CM

## 2019-12-04 DIAGNOSIS — Z12.5 PROSTATE CANCER SCREENING: ICD-10-CM

## 2019-12-04 DIAGNOSIS — L29.9 ITCHING: ICD-10-CM

## 2019-12-04 DIAGNOSIS — Z00.00 ANNUAL PHYSICAL EXAM: Primary | ICD-10-CM

## 2019-12-04 PROCEDURE — 99999 PR PBB SHADOW E&M-EST. PATIENT-LVL IV: CPT | Mod: PBBFAC,,, | Performed by: INTERNAL MEDICINE

## 2019-12-04 PROCEDURE — 99214 OFFICE O/P EST MOD 30 MIN: CPT | Mod: PBBFAC,PO | Performed by: INTERNAL MEDICINE

## 2019-12-04 PROCEDURE — 99214 PR OFFICE/OUTPT VISIT, EST, LEVL IV, 30-39 MIN: ICD-10-PCS | Mod: S$PBB,,, | Performed by: INTERNAL MEDICINE

## 2019-12-04 PROCEDURE — 99214 OFFICE O/P EST MOD 30 MIN: CPT | Mod: S$PBB,,, | Performed by: INTERNAL MEDICINE

## 2019-12-04 PROCEDURE — 99999 PR PBB SHADOW E&M-EST. PATIENT-LVL IV: ICD-10-PCS | Mod: PBBFAC,,, | Performed by: INTERNAL MEDICINE

## 2019-12-04 PROCEDURE — 90686 IIV4 VACC NO PRSV 0.5 ML IM: CPT | Mod: PBBFAC,PO

## 2019-12-04 RX ORDER — KETOCONAZOLE 20 MG/G
CREAM TOPICAL 2 TIMES DAILY
Qty: 60 G | Refills: 0 | Status: SHIPPED | OUTPATIENT
Start: 2019-12-04 | End: 2021-02-23

## 2019-12-04 RX ORDER — CITALOPRAM 40 MG/1
40 TABLET, FILM COATED ORAL DAILY
Qty: 90 TABLET | Refills: 3 | Status: SHIPPED | OUTPATIENT
Start: 2019-12-04 | End: 2021-02-08 | Stop reason: SDUPTHER

## 2019-12-04 RX ORDER — ATORVASTATIN CALCIUM 20 MG/1
20 TABLET, FILM COATED ORAL DAILY
Qty: 90 TABLET | Refills: 3 | Status: SHIPPED | OUTPATIENT
Start: 2019-12-04 | End: 2021-02-08 | Stop reason: SDUPTHER

## 2019-12-04 RX ORDER — VALSARTAN 320 MG/1
320 TABLET ORAL DAILY
Qty: 90 TABLET | Refills: 3 | Status: SHIPPED | OUTPATIENT
Start: 2019-12-04 | End: 2020-03-30

## 2019-12-04 NOTE — PROGRESS NOTES
Subjective:       Patient ID: Anthony Hanna is a 63 y.o. male.    Chief Complaint: Annual Exam; Blood Pressure Check; Shoulder Pain (Right shoulder pain with movement; Limited mobility); and Abdominal Pain (Right sided tenderness)    HPI     63 y.o. male here for annual exam.     Cholesterol: needs  Vaccines: Influenza - needs; Tetanus - 2017; Zoster - needs  Sexual Screening:   STD screening: no concern  Eye exam: done 1-2 months ago  Prostate: needs  Colonoscopy: 2018, due 2028  A1c: needs    Exercise:  No regular exercise.  Diet:  Home cooked.  No fast food.    He reports that he has right shoulder pain.  He has had a steroid injection that lasted about 6 weeks.  He is picking thing up and moving them.  It hurts pretty bad.  It doesn't stop him from doing anything.    Abdominal pain - he reports that he had a cold and had stomach pain about a year ago.  The pain is on the right side of his abdomen.  He has had this current pain the last 2 months most of the time.  It feels like a pulled muscle. He has to change position to get improvement.  He has tenderness on the left side of his abdomen.  This has been going on for the last year.  This lasted about 10 months.  It was present about 60-70% of the time.  The pain feels similar to when he moves his arm.  He has had not rash before the pain started.  No unexplained pain.  No changes in his bowel habits.  No nausea or vomiting.    His underarms itch and his crotch itch.  The itch has been going on for about 7 months.  This has been about the same.  He has redness under his arms.  No rash that he has noted.    He has been out of lisinopril since Friday.  He has checked his BP before he was off of this medication.  He was at 180 systolic on the medication.    Past Medical History:   Diagnosis Date    Ankle fracture     Cataract     Essential hypertension     HLD (hyperlipidemia)     Nuclear sclerosis - Both Eyes 04/16/2013    patient states he is not  familiar with this diagnosis      Past Surgical History:   Procedure Laterality Date    BACK SURGERY      COLONOSCOPY N/A 12/11/2018    Procedure: COLONOSCOPY;  Surgeon: PUMA Fernandez MD;  Location: [facility] ([address] [place] [address]);  Service: Endoscopy;  Laterality: N/A;    FRACTURE SURGERY      right ankle       Social History     Socioeconomic History    Marital status:      Spouse name: Not on file    Number of children: Not on file    Years of education: Not on file    Highest education level: Not on file   Occupational History    Not on file   Social Needs    Financial resource strain: Not on file    Food insecurity:     Worry: Not on file     Inability: Not on file    Transportation needs:     Medical: Not on file     Non-medical: Not on file   Tobacco Use    Smoking status: Current Some Day Smoker     Packs/day: 0.25     Types: Cigarettes    Smokeless tobacco: Never Used    Tobacco comment: quit October 2018; cessation program   Substance and Sexual Activity    Alcohol use: Yes     Comment: couple beers a week    Drug use: No    Sexual activity: Yes     Partners: Female     Comment:    Lifestyle    Physical activity:     Days per week: Not on file     Minutes per session: Not on file    Stress: Not on file   Relationships    Social connections:     Talks on phone: Not on file     Gets together: Not on file     Attends Judaism service: Not on file     Active member of club or organization: Not on file     Attends meetings of clubs or organizations: Not on file     Relationship status: Not on file   Other Topics Concern    Not on file   Social History Narrative    Not on file     Review of patient's allergies indicates:   Allergen Reactions    Pcn [penicillins] Anaphylaxis     Anthony Hanna had no medications administered during this visit.    Review of Systems    Objective:      Physical Exam   Constitutional: He is oriented to person, place, and time. He appears well-developed  and well-nourished.   HENT:   Head: Normocephalic and atraumatic.   Mouth/Throat: No oropharyngeal exudate.   Eyes: Pupils are equal, round, and reactive to light. EOM are normal. Right eye exhibits no discharge. Left eye exhibits no discharge. No scleral icterus.   Neck: Normal range of motion. Neck supple. No tracheal deviation present. No thyromegaly present.   Cardiovascular: Normal rate, regular rhythm and normal heart sounds. Exam reveals no gallop and no friction rub.   No murmur heard.  Pulmonary/Chest: Effort normal and breath sounds normal. No respiratory distress. He has no wheezes. He has no rales. He exhibits no tenderness.   Abdominal: Soft. Bowel sounds are normal. He exhibits no distension and no mass. There is no tenderness. There is no rigidity, no rebound, no guarding and negative Powell's sign.   Musculoskeletal: Normal range of motion. He exhibits no edema or tenderness.   Neurological: He is alert and oriented to person, place, and time.   Skin: Skin is warm and dry. No rash noted. No erythema. No pallor.   Psychiatric: He has a normal mood and affect. His behavior is normal.   Vitals reviewed.      Assessment:       1. Annual physical exam    2. Essential hypertension    3. Other hyperlipidemia    4. Depression, unspecified depression type    5. Prostate cancer screening    6. Chronic right shoulder pain    7. Impingement syndrome of right shoulder    8. Right flank pain    9. Itching        Plan:       1.  Check CBC, CMP, TSH, lipids, A1c.  2.  Change from lisinopril 40 mg to valsartan 320 mg.  Return to clinic in 2 weeks to reassess.  Bring log of blood pressures at this time.  3.  Continue Lipitor 20 mg.  4.  Celexa 40 mg daily.  5.  Check PSA.  6/7.  Refer to Sports Medicine.  8.  Check ultrasound abdomen.  9.  Given this is occurring the axilla and groin, try a course of ketoconazole ointment.  If this fails to relieve symptoms, patient advised to go back to cold showers.

## 2019-12-05 ENCOUNTER — HOSPITAL ENCOUNTER (OUTPATIENT)
Dept: RADIOLOGY | Facility: HOSPITAL | Age: 64
Discharge: HOME | End: 2019-12-05
Attending: INTERNAL MEDICINE
Payer: MEDICARE

## 2019-12-05 DIAGNOSIS — R10.9 RIGHT FLANK PAIN: ICD-10-CM

## 2019-12-05 PROCEDURE — 76700 US EXAM ABDOM COMPLETE: CPT | Mod: TC

## 2019-12-05 PROCEDURE — 76700 US ABDOMEN COMPLETE: ICD-10-PCS | Mod: 26,,, | Performed by: RADIOLOGY

## 2019-12-05 PROCEDURE — 76700 US EXAM ABDOM COMPLETE: CPT | Mod: 26,,, | Performed by: RADIOLOGY

## 2019-12-09 ENCOUNTER — TELEPHONE (OUTPATIENT)
Dept: SMOKING CESSATION | Facility: CLINIC | Age: 64
End: 2019-12-09

## 2019-12-11 DIAGNOSIS — M25.511 RIGHT SHOULDER PAIN, UNSPECIFIED CHRONICITY: Primary | ICD-10-CM

## 2019-12-12 ENCOUNTER — OFFICE VISIT (OUTPATIENT)
Dept: ORTHOPEDICS | Facility: CLINIC | Age: 64
End: 2019-12-12
Payer: MEDICARE

## 2019-12-12 ENCOUNTER — HOSPITAL ENCOUNTER (OUTPATIENT)
Dept: RADIOLOGY | Facility: HOSPITAL | Age: 64
Discharge: HOME | End: 2019-12-12
Attending: NEUROMUSCULOSKELETAL MEDICINE & OMM
Payer: MEDICARE

## 2019-12-12 VITALS
BODY MASS INDEX: 28.14 KG/M2 | WEIGHT: 190 LBS | SYSTOLIC BLOOD PRESSURE: 165 MMHG | DIASTOLIC BLOOD PRESSURE: 95 MMHG | HEIGHT: 69 IN

## 2019-12-12 DIAGNOSIS — M75.41 IMPINGEMENT SYNDROME OF RIGHT SHOULDER: ICD-10-CM

## 2019-12-12 DIAGNOSIS — R29.3 POOR POSTURE: ICD-10-CM

## 2019-12-12 DIAGNOSIS — I10 ELEVATED BLOOD PRESSURE READING IN OFFICE WITH DIAGNOSIS OF HYPERTENSION: ICD-10-CM

## 2019-12-12 DIAGNOSIS — M99.07 UPPER EXTREMITY SOMATIC DYSFUNCTION: ICD-10-CM

## 2019-12-12 DIAGNOSIS — M79.10 MYALGIA: ICD-10-CM

## 2019-12-12 DIAGNOSIS — M25.511 CHRONIC RIGHT SHOULDER PAIN: Primary | ICD-10-CM

## 2019-12-12 DIAGNOSIS — M99.08 SOMATIC DYSFUNCTION OF RIB CAGE REGION: ICD-10-CM

## 2019-12-12 DIAGNOSIS — M99.02 SOMATIC DYSFUNCTION OF THORACIC REGION: ICD-10-CM

## 2019-12-12 DIAGNOSIS — M25.511 RIGHT SHOULDER PAIN, UNSPECIFIED CHRONICITY: ICD-10-CM

## 2019-12-12 DIAGNOSIS — G89.29 CHRONIC RIGHT SHOULDER PAIN: Primary | ICD-10-CM

## 2019-12-12 PROCEDURE — 73030 X-RAY EXAM OF SHOULDER: CPT | Mod: TC,PO,RT

## 2019-12-12 PROCEDURE — 99999 PR PBB SHADOW E&M-EST. PATIENT-LVL III: CPT | Mod: PBBFAC,,, | Performed by: NEUROMUSCULOSKELETAL MEDICINE & OMM

## 2019-12-12 PROCEDURE — 98926 PR OSTEOPATHIC MANIP,3-4 BODY REGN: ICD-10-PCS | Mod: S$PBB,,, | Performed by: NEUROMUSCULOSKELETAL MEDICINE & OMM

## 2019-12-12 PROCEDURE — 73030 X-RAY EXAM OF SHOULDER: CPT | Mod: 26,RT,, | Performed by: RADIOLOGY

## 2019-12-12 PROCEDURE — 99213 OFFICE O/P EST LOW 20 MIN: CPT | Mod: PBBFAC,25,PO | Performed by: NEUROMUSCULOSKELETAL MEDICINE & OMM

## 2019-12-12 PROCEDURE — 99204 OFFICE O/P NEW MOD 45 MIN: CPT | Mod: 25,S$PBB,, | Performed by: NEUROMUSCULOSKELETAL MEDICINE & OMM

## 2019-12-12 PROCEDURE — 98926 OSTEOPATH MANJ 3-4 REGIONS: CPT | Mod: PBBFAC,PO | Performed by: NEUROMUSCULOSKELETAL MEDICINE & OMM

## 2019-12-12 PROCEDURE — 99204 PR OFFICE/OUTPT VISIT, NEW, LEVL IV, 45-59 MIN: ICD-10-PCS | Mod: 25,S$PBB,, | Performed by: NEUROMUSCULOSKELETAL MEDICINE & OMM

## 2019-12-12 PROCEDURE — 73030 XR SHOULDER COMPLETE 2 OR MORE VIEWS RIGHT: ICD-10-PCS | Mod: 26,RT,, | Performed by: RADIOLOGY

## 2019-12-12 PROCEDURE — 99999 PR PBB SHADOW E&M-EST. PATIENT-LVL III: ICD-10-PCS | Mod: PBBFAC,,, | Performed by: NEUROMUSCULOSKELETAL MEDICINE & OMM

## 2019-12-12 PROCEDURE — 98926 OSTEOPATH MANJ 3-4 REGIONS: CPT | Mod: S$PBB,,, | Performed by: NEUROMUSCULOSKELETAL MEDICINE & OMM

## 2019-12-12 NOTE — PROGRESS NOTES
Subjective:     Anhtony Hanna     Chief Complaint   Patient presents with    Shoulder Pain     right       HPI    Anthony is a 63 y.o. male coming in today for right shoulder pain that began 2 year(s) ago, referred by Zhang Denise MD. Pt. describes the pain as a 6/10 dull pain that does not radiate. There was not a fall/injury/ or trauma associated with the onset of symptoms. Pt was last evaluated for his right shoulder playing in 2017 when he was told he had impingement and received a CSI. Pt states he did not have much relief from the injection. The pain is better with rest and worse with flexion, Abduction, and overhead reaching. Pt. Denies any other musculoskeletal complaints at this time.     Joint instability? no  Mechanical locking/clicking? no  Affecting ADL's? yes  Affecting sleep? yes    Occupation: retired    Review of Systems   Constitutional: Negative for chills and fever.   HENT: Negative for hearing loss and tinnitus.    Eyes: Negative for blurred vision and photophobia.   Respiratory: Negative for cough and shortness of breath.    Cardiovascular: Negative for chest pain and leg swelling.   Gastrointestinal: Negative for abdominal pain, heartburn, nausea and vomiting.   Genitourinary: Negative for dysuria and hematuria.   Musculoskeletal: Positive for joint pain. Negative for back pain, falls, myalgias and neck pain.   Skin: Negative for rash.   Neurological: Negative for dizziness, tingling, focal weakness, weakness and headaches.   Endo/Heme/Allergies: Positive for environmental allergies. Does not bruise/bleed easily.   Psychiatric/Behavioral: Negative for depression. The patient is not nervous/anxious.        PAST MEDICAL HISTORY:   Past Medical History:   Diagnosis Date    Ankle fracture     Cataract     Essential hypertension     HLD (hyperlipidemia)     Nuclear sclerosis - Both Eyes 04/16/2013    patient states he is not familiar with this diagnosis      PAST SURGICAL HISTORY:   Past Surgical  History:   Procedure Laterality Date    BACK SURGERY      COLONOSCOPY N/A 12/11/2018    Procedure: COLONOSCOPY;  Surgeon: PUMA Fernandez MD;  Location: [facility] ([address] [place] [address]);  Service: Endoscopy;  Laterality: N/A;    FRACTURE SURGERY      right ankle       FAMILY HISTORY:   Family History   Problem Relation Age of Onset    Glaucoma Mother     Stroke Mother     Heart disease Mother         valve replacement    No Known Problems Father     No Known Problems Sister     Hypertension Brother     Hyperlipidemia Brother     No Known Problems Maternal Aunt     No Known Problems Maternal Uncle     No Known Problems Paternal Aunt     No Known Problems Paternal Uncle     Heart disease Maternal Grandmother     Heart disease Maternal Grandfather     No Known Problems Paternal Grandmother     No Known Problems Paternal Grandfather     Amblyopia Neg Hx     Blindness Neg Hx     Cancer Neg Hx     Cataracts Neg Hx     Diabetes Neg Hx     Macular degeneration Neg Hx     Retinal detachment Neg Hx     Strabismus Neg Hx     Thyroid disease Neg Hx      SOCIAL HISTORY:   Social History     Socioeconomic History    Marital status:      Spouse name: Not on file    Number of children: Not on file    Years of education: Not on file    Highest education level: Not on file   Occupational History    Not on file   Social Needs    Financial resource strain: Not on file    Food insecurity:     Worry: Not on file     Inability: Not on file    Transportation needs:     Medical: Not on file     Non-medical: Not on file   Tobacco Use    Smoking status: Current Some Day Smoker     Packs/day: 0.25     Types: Cigarettes    Smokeless tobacco: Never Used    Tobacco comment: quit October 2018; cessation program   Substance and Sexual Activity    Alcohol use: Yes     Comment: couple beers a week    Drug use: No    Sexual activity: Yes     Partners: Female     Comment:    Lifestyle    Physical activity:  "    Days per week: Not on file     Minutes per session: Not on file    Stress: Not on file   Relationships    Social connections:     Talks on phone: Not on file     Gets together: Not on file     Attends Baptism service: Not on file     Active member of club or organization: Not on file     Attends meetings of clubs or organizations: Not on file     Relationship status: Not on file   Other Topics Concern    Not on file   Social History Narrative    Not on file       MEDICATIONS:   Current Outpatient Medications:     aspirin (ECOTRIN) 81 MG EC tablet, Take 81 mg by mouth once daily., Disp: , Rfl:     atorvastatin (LIPITOR) 20 MG tablet, Take 1 tablet (20 mg total) by mouth once daily., Disp: 90 tablet, Rfl: 3    citalopram (CELEXA) 40 MG tablet, Take 1 tablet (40 mg total) by mouth once daily., Disp: 90 tablet, Rfl: 3    fluticasone (FLONASE) 50 mcg/actuation nasal spray, 1 spray (50 mcg total) by Each Nare route once daily., Disp: 16 g, Rfl: 11    ketoconazole (NIZORAL) 2 % cream, Apply topically 2 (two) times daily., Disp: 60 g, Rfl: 0    valsartan (DIOVAN) 320 MG tablet, Take 1 tablet (320 mg total) by mouth once daily., Disp: 90 tablet, Rfl: 3    varenicline (CHANTIX) 1 mg Tab, Take 1 tablet (1 mg total) by mouth 2 (two) times daily. CX, Disp: 58 tablet, Rfl: 0  ALLERGIES:   Review of patient's allergies indicates:   Allergen Reactions    Pcn [penicillins] Anaphylaxis     Office note from Abbey Leach PA-C on 12/1/17 reviewed:  Right shoulder CSI received for impingement syndrome.  Plan for physical therapy if pain did not improve.     Objective:     VITAL SIGNS: BP (!) 165/95 (BP Location: Right arm, Patient Position: Sitting, BP Method: Medium (Manual))   Ht 5' 9" (1.753 m)   Wt 86.2 kg (190 lb)   BMI 28.06 kg/m²    General    Nursing note and vitals reviewed.  Constitutional: He is oriented to person, place, and time. He appears well-developed and well-nourished.   HENT:   Head: " Normocephalic and atraumatic.   no nasal discharge, no external ear redness or discharge   Eyes:   EOM is full and smooth  No eye redness or discharge   Neck: Neck supple. No tracheal deviation present.   Cardiovascular: Normal rate.    2+ Radial pulse bilaterally  2+ Dorsalis Pedis pulse bilaterally  No LE edema appreciated   Pulmonary/Chest: Effort normal. No respiratory distress.   Abdominal: He exhibits no distension.   No rigidity   Neurological: He is alert and oriented to person, place, and time. He exhibits normal muscle tone. Coordination normal.   See details below   Psychiatric: He has a normal mood and affect. His behavior is normal.               MUSCULOSKELETAL EXAM  Shoulder: right SHOULDER  The affected shoulder is compared to the contralateral shoulder.    Observation:      SHOULDER  No ecchymosis, edema, or erythema throughout the shoulder girdle.  No sternal, clavicular, or acromial deformities bilaterally.  No atrophy of the pectorals, deltoids, supraspinatus, infraspinatus, or biceps bilaterally.  No asymmetry of shoulders bilaterally.    Posture:  Increased thoracic kyphosis, Anterior head carriage and Posterior pelvis tilt with loss of lumbar lordosis    ROM (* = with pain):  CERVICAL SPINE  Full AROM in flexion, extension, sidebending, and rotation.    SHOULDER  Active flexion to 180° on left and 180° on right* (pain starting at 90 degrees- improved following OMT).  Active abduction to 180° on left and 180° on right* (pain starting at 90°- improved following OMT).  Active internal rotation to T8 on left and L3 on right*.    Active external rotation to T2 on left and T2 on right*.    No scapular dyskinesia or winging.    Tenderness:  No tenderness at the SC or AC joint  No tenderness over the clavicle   No tenderness over biceps tendon or bicipital groove  No tenderness over subacromial space    Strength Testing (* = with pain):  Deltoid - 5/5 on left and 5/5 on right  Biceps - 5/5 on left and  5/5 on right  Triceps - 5/5 on left and 5/5 on right  Wrist extension - 5/5 on left and 5/5 on right  Wrist flexion - 5/5 on left and 5/5 on right   - 5/5 on left and 5/5 on right  Finger extension - 5/5 on left and 5/5 on right  Finger abduction - 5/5 on left and 5/5 on right    Special Tests:  Empty can test - negative for weakness but positive for pain  Full can test -  negative for weakness but positive for pain  Bear hug test - negative  Belly press test - negative  Resisted internal rotation - negative  Resisted external rotation - negative    Neer's test -positive  Hawkin's-Kahlil test - positive  Cross-arm test- negative    OZackarys test - negative    Sulcus sign - none  AP load and shift laxity - none  Anterior apprehension test - negative  Posterior apprehension test - negative    Speed's test - negative  Yergason's test - negative    Neurovascular Exam:  Spurlings test - negative bialterally  2+ radial pulses bilaterally  Sensation intact to light touch in the distal median, radial, and ulnar nerve distributions bilaterally.  2+/4 reflexes at triceps, biceps, and brachioradialis  Capillary refill intact <2 seconds in all digits bilaterally    TART (Tissue texture abnormality, Asymmetry,  Restriction of motion and/or Tenderness) changes:     Thoracic Spine   T1 Neutral   T2 Neutral   T3 Neutral   T4 Neutral   T5 Neutral   T6 FRS RIGHT   T7 FRS RIGHT   T8 FRS RIGHT   T9 Neutral   T10 Neutral   T11 Neutral   T12 Neutral     Rib cage: R1 inhaled on right, R7 external torsion on right    Upper extremity: Right posterior deltoid and anterior deltoid Herniated trigger point (HTP) fascial distortion     Key   F= Flexed   E = Extended   R = Rotated   S = Sidebent   TTA = tissue texture abnormality     IMAGIN. X-ray ordered due to right shoulder. (AP, scapular Y, and axillary views) taken today. Comparison films from 2017  2. X-ray images were reviewed personally by me and then directly with  patient.  3. FINDINGS: No fracture, dislocation, or bone destruction seen. Mild DJD noted at the acromioclavicular joint. No significant change from 11/29/2017 comparison films.  4. IMPRESSION: No significant pathology or irregularities appreciated.     Assessment:      Encounter Diagnoses   Name Primary?    Chronic right shoulder pain Yes    Impingement syndrome of right shoulder     Myalgia     Poor posture     Somatic dysfunction of thoracic region     Somatic dysfunction of rib cage region     Upper extremity somatic dysfunction           Plan:     1. Right shoulder pain secondary to impingement from poor posture with associated poor thoracic and scapular biomechanics.   - OMT performed today to address associated biomechanical restrictions, noting significant improvement in symptoms following treatment, and HEP started.   - Recommend Ice up to 20 minutes at a time prn for pain control  -  X-ray images of right shoulder taken today (AP, scapular Y, and axillary views) showed no significant abnormalities. Images were personally reviewed with patient.    2.  Elevated BP reading at today's visit.  Patient is currently on blood pressure medication and being followed by his primary doctor, Dr. Denise, for this.  Patient was recently changed from lisinopril 40 mg to valsartan 320 mg and has a scheduled follow-up with Dr. Denise on 12/18/19.     3. OMT 3-4 regions. Oral consent obtained.  Reviewed benefits and potential side effects, including bruising at site of treatment and increased soreness for the next 24-48 hours. Pt. Instructed to increased water intake by 1 L today and tomorrow to help with any residual soreness.   - OMT indicated today due to signs and symptoms as well as local and remote somatic dysfunction findings and their related neurokinetic, lymphatic, fascial and/or arteriovenous body connections.   - OMT techniques used: Myofascial Release, Muscle Energy, Still's Technique and Fascial Distortion  Model   - Treatment was tolerated well. Improvement noted in segmental mobility post-treatment in dysfunctional regions. There were no adverse events and no complications immediately following treatment.     4. Pt. Given the following HEP:  A)  Supine Thoracic extension exercise: 10-15 reps, twice daily. Handout also given.   B) Cervicothoracic junction mobilization exercise: 10-15 reps, twice daily. Handout also given.   C) Seated anterior pelvic tilt exercise: rotate pelvis forward to sit on ischial tuberosities while maintaining neutral shoulder positioning. Repeat frequently throughout the day.   D) Scalene, upper trapezius, and levator scapulae stretches : hold neck sidebending stretch for 30 seconds in each plane, bilaterally, twice daily. Hand out given.     [address] HOME EXERCISE PROGRAM (HEP):  The patient was taught a homegoing physical therapy regimen as described above. The patient demonstrated understanding of the exercises and proper technique of their execution. This interaction took 15 minutes.     5. Follow-up in 4 weeks for reevaluation    6. Patient agreeable to today's plan and all questions were answered    This note is dictated using the M*Modal Fluency Direct word recognition program. There are word recognition mistakes that are occasionally missed on review.

## 2019-12-12 NOTE — LETTER
December 12, 2019      Zhang Denise MD  [address] [facility]  [place] [address]           [place] [facility]  [address] [facility].  [place] [address]  Phone: [phone]          Patient: Anthony Hanna   MR Number: [id number]   YOB: 1955   Date of Visit: 12/12/2019       Dear Dr. Zhang Denise:    Thank you for referring Anthony Hanna to me for evaluation. Attached you will find relevant portions of my assessment and plan of care.    If you have questions, please do not hesitate to call me. I look forward to following Anthony Hanna along with you.    Sincerely,    Destiny Wick, DO    Enclosure  CC:  No Recipients    If you would like to receive this communication electronically, please contact [email or web address] or [phone] to request more information on [employer] access.    For providers and/or their staff who would like to refer a patient to [facility], please contact us through our one-stop-shop provider referral line, Michael Mcgill, at [phone].    If you feel you have received this communication in error or would no longer like to receive these types of communications, please e-mail [email or web address]

## 2019-12-17 ENCOUNTER — CLINICAL SUPPORT (OUTPATIENT)
Dept: SMOKING CESSATION | Facility: CLINIC | Age: 64
End: 2019-12-17
Payer: COMMERCIAL

## 2019-12-17 DIAGNOSIS — F17.210 LIGHT CIGARETTE SMOKER (1-9 CIGARETTES PER DAY): Primary | ICD-10-CM

## 2019-12-17 PROCEDURE — 90853 GROUP PSYCHOTHERAPY: CPT | Mod: S$GLB,,,

## 2019-12-17 PROCEDURE — 99999 PR PBB SHADOW E&M-EST. PATIENT-LVL I: ICD-10-PCS | Mod: PBBFAC,,,

## 2019-12-17 PROCEDURE — 90853 PR GROUP PSYCHOTHERAPY: ICD-10-PCS | Mod: S$GLB,,,

## 2019-12-17 PROCEDURE — 99999 PR PBB SHADOW E&M-EST. PATIENT-LVL I: CPT | Mod: PBBFAC,,,

## 2019-12-17 NOTE — Clinical Note
Just a note to advise how the patient is progressing in the tobacco cessation program. The patient is smoking 8 cigarettes per day and a quit was discussed but not set at this time. Patient did agree to reduce to 5 per day within the next week. The patient remains on the prescribed tobacco cessation medication regimen of 1 mg Chantix BID without any negative side effects at this time.

## 2019-12-17 NOTE — PROGRESS NOTES
Smoking Cessation Group Session #3    Site: [facility]  Date:  12/17/2019  Clinical Status of Patient: Outpatient   Length of Service and Code: 60 minutes - 39806   Number in Attendance: 5  Group Activities/Focus of Group:  completion of TCRS (Tobacco Cessation Rating Scale) reviewed strategies, controlling environment, cues, triggers, new goals set. Introduced high risk situations with preparation interventions, caffeine similarities with withdrawal issues of habit and nicotine, Alcohol, Understanding urges, cravings, stress and relaxation. Open discussion with intervention discussion.  Target symptoms:  withdrawal and medication side effects             The following were rated moderate (3) to severe (4) on TCRS:       Moderate 3: none     Severe 4:   none  Patient's Response to Intervention: The patient is smoking 8 cigarettes per day and a quit was discussed but not set at this time. Patient did agree to reduce to 5 per day within the next week. The patient remains on the prescribed tobacco cessation medication regimen of 1 mg Chantix BID without any negative side effects at this time.   Progress Toward Goals and Other Mental Status Changes: The patient denies any abnormal behavioral or mental changes at this time.     Diagnosis: Z72.0  Plan: The patient will continue with group therapy sessions and medication monitoring by CTTS. Prescribed medication management will be by physician.   Return to Clinic: 3 weeks    Quit Date:    Planned Quit Date:

## 2020-01-07 ENCOUNTER — CLINICAL SUPPORT (OUTPATIENT)
Dept: SMOKING CESSATION | Facility: CLINIC | Age: 65
End: 2020-01-07
Payer: COMMERCIAL

## 2020-01-07 DIAGNOSIS — F17.210 CIGARETTE NICOTINE DEPENDENCE, UNCOMPLICATED: Primary | ICD-10-CM

## 2020-01-07 PROCEDURE — 90853 PR GROUP PSYCHOTHERAPY: ICD-10-PCS | Mod: S$GLB,,,

## 2020-01-07 PROCEDURE — 99999 PR PBB SHADOW E&M-EST. PATIENT-LVL I: CPT | Mod: PBBFAC,,,

## 2020-01-07 PROCEDURE — 99999 PR PBB SHADOW E&M-EST. PATIENT-LVL I: ICD-10-PCS | Mod: PBBFAC,,,

## 2020-01-07 PROCEDURE — 90853 GROUP PSYCHOTHERAPY: CPT | Mod: S$GLB,,,

## 2020-01-07 NOTE — Clinical Note
Just a note to advise how the patient is progressing in the tobacco cessation program. The patient is tobacco free as of 1/5/2020 and is not experiencing any negative side effects from the quit at this time. The patient remains on the prescribed tobacco cessation medication regimen of 1 mg Chantix BID without any negative side effects at this time.

## 2020-01-08 ENCOUNTER — PATIENT OUTREACH (OUTPATIENT)
Dept: ADMINISTRATIVE | Facility: OTHER | Age: 65
End: 2020-01-08

## 2020-01-21 ENCOUNTER — TELEPHONE (OUTPATIENT)
Dept: SMOKING CESSATION | Facility: CLINIC | Age: 65
End: 2020-01-21

## 2020-01-21 NOTE — TELEPHONE ENCOUNTER
Left message about missed group session and about medication regimen. Left my name Jeni Donald and phone number [phone].

## 2020-02-04 DIAGNOSIS — Z71.9 HEALTH EDUCATION/COUNSELING: Primary | ICD-10-CM

## 2020-03-30 ENCOUNTER — PATIENT MESSAGE (OUTPATIENT)
Dept: INTERNAL MEDICINE | Facility: CLINIC | Age: 65
End: 2020-03-30

## 2020-03-30 ENCOUNTER — TELEPHONE (OUTPATIENT)
Dept: INTERNAL MEDICINE | Facility: CLINIC | Age: 65
End: 2020-03-30

## 2020-03-30 RX ORDER — CANDESARTAN 32 MG/1
32 TABLET ORAL DAILY
Qty: 90 TABLET | Refills: 2 | Status: SHIPPED | OUTPATIENT
Start: 2020-03-30 | End: 2020-03-30 | Stop reason: SDUPTHER

## 2020-03-30 RX ORDER — CANDESARTAN 32 MG/1
32 TABLET ORAL DAILY
Qty: 90 TABLET | Refills: 2 | Status: SHIPPED | OUTPATIENT
Start: 2020-03-30 | End: 2021-02-08 | Stop reason: SDUPTHER

## 2020-03-30 NOTE — TELEPHONE ENCOUNTER
----- Message from Berenice Alegre sent at 3/30/2020 10:49 AM CDT -----  Contact: [phone]  Patient is requesting a call from the office regarding his new insurance [employer] for his medications.  Patient stated his refills should go to [facility] at [address] [place] [address].    Patient stated he need to speak with someone in the office first. Please do not send any prescription to [facility].    Please advise, thank you

## 2020-03-30 NOTE — TELEPHONE ENCOUNTER
----- Message from Blanche Martinez sent at 3/30/2020 10:19 AM CDT -----  Needs Advice    Reason for call:--Medication valsartan (DIOVAN) 320 MG tablet--        Communication Preference:--Anthony--[phone]--    Additional Information:- Mr meléndez states that the medication listed above is on backorder and they do not have a time frame that it will be in. He would like to see what he needs to do because he only has 3 pills left. Per pt states that he check [facility] store for the medication but no one has it. Please call to advise.

## 2020-03-30 NOTE — TELEPHONE ENCOUNTER
Left msg on home recorder candesartan rx sent to mail order to replace valsartan  Call if need anything further.

## 2020-04-23 ENCOUNTER — OFFICE VISIT (OUTPATIENT)
Dept: INTERNAL MEDICINE | Facility: CLINIC | Age: 65
End: 2020-04-23
Payer: MEDICARE

## 2020-04-23 DIAGNOSIS — G43.809 OTHER MIGRAINE WITHOUT STATUS MIGRAINOSUS, NOT INTRACTABLE: Primary | ICD-10-CM

## 2020-04-23 DIAGNOSIS — L03.213 PERIORBITAL CELLULITIS OF RIGHT EYE: ICD-10-CM

## 2020-04-23 PROCEDURE — G0463 HOSPITAL OUTPT CLINIC VISIT: HCPCS | Mod: PO

## 2020-04-23 PROCEDURE — 99214 OFFICE O/P EST MOD 30 MIN: CPT | Mod: 95,,, | Performed by: INTERNAL MEDICINE

## 2020-04-23 PROCEDURE — 99211 OFF/OP EST MAY X REQ PHY/QHP: CPT | Mod: PO

## 2020-04-23 PROCEDURE — 99214 PR OFFICE/OUTPT VISIT, EST, LEVL IV, 30-39 MIN: ICD-10-PCS | Mod: 95,,, | Performed by: INTERNAL MEDICINE

## 2020-04-23 RX ORDER — METHYLPREDNISOLONE 4 MG/1
TABLET ORAL
Qty: 21 TABLET | Refills: 0 | Status: SHIPPED | OUTPATIENT
Start: 2020-04-23 | End: 2021-02-23

## 2020-04-23 RX ORDER — CLINDAMYCIN HYDROCHLORIDE 300 MG/1
300 CAPSULE ORAL 3 TIMES DAILY
Qty: 21 CAPSULE | Refills: 0 | Status: SHIPPED | OUTPATIENT
Start: 2020-04-23 | End: 2020-04-30

## 2020-04-23 NOTE — PROGRESS NOTES
Subjective:       Patient ID: Anthony Hanna is a 64 y.o. male.    Chief Complaint: Headache    HPI     The patient location is: home   The chief complaint leading to consultation is:  Headache  Total time spent with patient: 14 minutes  Visit type: Virtual visit with synchronous audio only and video  Each patient to whom he or she provides medical services by telemedicine is: (1) informed of the relationship between the physician and patient and the respective role of any other health care provider with respect to management of the patient; and (2) notified that he or she may decline to receive medical services by telemedicine and may withdraw from such care at any time.    64-year-old male here for evaluation of a headache.  He reports that he has had a severe headache since Monday.  It is all up in his temporal lobe and eye on the right side of his head.  He has a sharp pain by his eye.  It has been constant since Monday.  He has tried aleve, tylenol, ibuprofen.  It is an 8/10.  The pain is sharp once in a while.  The sharp pain started yesterday.  Today, it is fairly prominent.  It depends on how much he moves.  It feels like he had bruised his eye.  The pain is aching in his eyeball.  The top of his head is sensitive.  He had no auras.   The right eye lid is more closed than the left eyelid.  His eyelid is puffy as well.  His vision is ok in both eyes.  His teeth are not great.  He still has one left in his top rear of his mouth.  He thought this was a tooth going south.  He called his dentist this am.  He has no swelling around the tooth and the tooth is stable. His dentist thought this was something else.      Review of Systems    Objective:      Physical Exam   Constitutional: He appears well-developed and well-nourished. No distress.   HENT:   Head: Normocephalic and atraumatic.   Right Ear: External ear normal.   Left Ear: External ear normal.   Neck: Normal range of motion.   Pulmonary/Chest: Effort  normal.   Skin: He is not diaphoretic.       Assessment:       1. Other migraine without status migrainosus, not intractable    2. Periorbital cellulitis of right eye        Plan:       1.  Medrol dose pack.  Could be related to an infection.  2.  Clindamycin 300 mg TID x 7 days. Counseled on ER precautions

## 2020-04-25 ENCOUNTER — PATIENT MESSAGE (OUTPATIENT)
Dept: INTERNAL MEDICINE | Facility: CLINIC | Age: 65
End: 2020-04-25

## 2020-04-25 ENCOUNTER — HOSPITAL ENCOUNTER (EMERGENCY)
Facility: HOSPITAL | Age: 65
Discharge: HOME | End: 2020-04-25
Attending: EMERGENCY MEDICINE
Payer: MEDICARE

## 2020-04-25 VITALS
WEIGHT: 190 LBS | RESPIRATION RATE: 16 BRPM | HEIGHT: 69 IN | BODY MASS INDEX: 28.14 KG/M2 | HEART RATE: 78 BPM | DIASTOLIC BLOOD PRESSURE: 92 MMHG | OXYGEN SATURATION: 95 % | TEMPERATURE: 98 F | SYSTOLIC BLOOD PRESSURE: 187 MMHG

## 2020-04-25 DIAGNOSIS — B02.30 HERPES ZOSTER WITH OPHTHALMIC COMPLICATION, UNSPECIFIED HERPES ZOSTER EYE DISEASE: Primary | ICD-10-CM

## 2020-04-25 PROCEDURE — 63600175 PHARM REV CODE 636 W HCPCS: Performed by: EMERGENCY MEDICINE

## 2020-04-25 PROCEDURE — 99284 EMERGENCY DEPT VISIT MOD MDM: CPT | Mod: 25

## 2020-04-25 PROCEDURE — 25000003 PHARM REV CODE 250: Performed by: EMERGENCY MEDICINE

## 2020-04-25 PROCEDURE — 99284 EMERGENCY DEPT VISIT MOD MDM: CPT | Mod: ,,, | Performed by: EMERGENCY MEDICINE

## 2020-04-25 PROCEDURE — 99284 PR EMERGENCY DEPT VISIT,LEVEL IV: ICD-10-PCS | Mod: ,,, | Performed by: EMERGENCY MEDICINE

## 2020-04-25 PROCEDURE — 96365 THER/PROPH/DIAG IV INF INIT: CPT

## 2020-04-25 PROCEDURE — 96366 THER/PROPH/DIAG IV INF ADDON: CPT

## 2020-04-25 RX ORDER — VALACYCLOVIR HYDROCHLORIDE 1 G/1
1000 TABLET, FILM COATED ORAL 3 TIMES DAILY
Qty: 21 TABLET | Refills: 0 | Status: SHIPPED | OUTPATIENT
Start: 2020-04-25 | End: 2021-02-23

## 2020-04-25 RX ORDER — OXYCODONE AND ACETAMINOPHEN 5; 325 MG/1; MG/1
1 TABLET ORAL EVERY 4 HOURS PRN
Qty: 12 TABLET | Refills: 0 | Status: SHIPPED | OUTPATIENT
Start: 2020-04-25 | End: 2021-02-23

## 2020-04-25 RX ORDER — ACETAMINOPHEN 500 MG
1000 TABLET ORAL
Status: COMPLETED | OUTPATIENT
Start: 2020-04-25 | End: 2020-04-25

## 2020-04-25 RX ORDER — PROPARACAINE HYDROCHLORIDE 5 MG/ML
1 SOLUTION/ DROPS OPHTHALMIC
Status: COMPLETED | OUTPATIENT
Start: 2020-04-25 | End: 2020-04-25

## 2020-04-25 RX ORDER — ERYTHROMYCIN 5 MG/G
OINTMENT OPHTHALMIC
Qty: 1 TUBE | Refills: 0 | Status: SHIPPED | OUTPATIENT
Start: 2020-04-25 | End: 2021-02-23

## 2020-04-25 RX ADMIN — FLUORESCEIN SODIUM 1 EACH: 1 STRIP OPHTHALMIC at 09:04

## 2020-04-25 RX ADMIN — PROPARACAINE HYDROCHLORIDE 1 DROP: 5 SOLUTION/ DROPS OPHTHALMIC at 09:04

## 2020-04-25 RX ADMIN — ACETAMINOPHEN 1000 MG: 500 TABLET ORAL at 09:04

## 2020-04-25 RX ADMIN — ACYCLOVIR SODIUM 710 MG: 50 INJECTION, SOLUTION INTRAVENOUS at 10:04

## 2020-04-25 NOTE — ED PROVIDER NOTES
Encounter Date: 4/25/2020       History     Chief Complaint   Patient presents with    Migraine     had recent virtual visit for same, given abx and medrol dose pack. not getting better. cellulitis is to right eye brow. denies head injury or trauma     Cellulitis     63 yo m, h/o HTN, c/o R forehead/head pain x 5 days, initially w severe HA, HA has actually improved over the past 2 days but now with severe pain to R forehead skin/R parietal scalp, with worsening pain and tearing to R eye.  No vision loss.  No fever. No neck stiffness.  Seen in virtual visit 2 days ago - dx'd with cellulitis and rx'd medrol dose pack and clinda.    The history is provided by the patient.     Review of patient's allergies indicates:   Allergen Reactions    Pcn [penicillins] Anaphylaxis     Past Medical History:   Diagnosis Date    Ankle fracture     Cataract     Essential hypertension     HLD (hyperlipidemia)     Nuclear sclerosis - Both Eyes 04/16/2013    patient states he is not familiar with this diagnosis      Past Surgical History:   Procedure Laterality Date    BACK SURGERY      COLONOSCOPY N/A 12/11/2018    Procedure: COLONOSCOPY;  Surgeon: PUMA Fernandez MD;  Location: [address]);  Service: Endoscopy;  Laterality: N/A;    FRACTURE SURGERY      right ankle       Family History   Problem Relation Age of Onset    Glaucoma Mother     Stroke Mother     Heart disease Mother         valve replacement    No Known Problems Father     No Known Problems Sister     Hypertension Brother     Hyperlipidemia Brother     No Known Problems Maternal Aunt     No Known Problems Maternal Uncle     No Known Problems Paternal Aunt     No Known Problems Paternal Uncle     Heart disease Maternal Grandmother     Heart disease Maternal Grandfather     No Known Problems Paternal Grandmother     No Known Problems Paternal Grandfather     Amblyopia Neg Hx     Blindness Neg Hx     Cancer Neg Hx     Cataracts Neg Hx      Diabetes Neg Hx     Macular degeneration Neg Hx     Retinal detachment Neg Hx     Strabismus Neg Hx     Thyroid disease Neg Hx      Social History     Tobacco Use    Smoking status: Former Smoker     Packs/day: 0.25     Types: Cigarettes     Last attempt to quit: 2020     Years since quittin.3    Smokeless tobacco: Never Used    Tobacco comment: quit 2018; cessation program   Substance Use Topics    Alcohol use: Yes     Comment: couple beers a week    Drug use: No     Review of Systems   Constitutional: Negative for chills and fever.   Eyes: Positive for photophobia, pain, discharge and redness. Negative for itching and visual disturbance.   Respiratory: Negative for shortness of breath.    Cardiovascular: Negative for chest pain.   Neurological: Positive for headaches.       Physical Exam     Initial Vitals [20 0820]   BP Pulse Resp Temp SpO2   (!) 196/104 78 20 98.6 °F (37 °C) 97 %      MAP       --         Physical Exam    Nursing note and vitals reviewed.  Constitutional: He appears well-developed and well-nourished. He is not diaphoretic. No distress.   HENT:   Mouth/Throat: Oropharynx is clear and moist.   Eyes: EOM are normal. Pupils are equal, round, and reactive to light. Right eye exhibits discharge. Right eye exhibits no chemosis and no exudate. No foreign body present in the right eye. Right conjunctiva is injected. No scleral icterus.   R lid ptosis   Neck: Neck supple.   Cardiovascular: Normal rate.   Pulmonary/Chest: No respiratory distress.   Neurological: He is alert and oriented to person, place, and time. No cranial nerve deficit. GCS score is 15. GCS eye subscore is 4. GCS verbal subscore is 5. GCS motor subscore is 6.   Skin: Rash noted.   Please see attached picture    Multiple raised, erythematous lesions, some w early vesicular lesions, concentrated in R periorbital region but also to R scalp   Psychiatric: He has a normal mood and affect. Thought content  normal.             ED Course   Procedures  Labs Reviewed - No data to display       Imaging Results    None          Medical Decision Making:   History:   Old Medical Records: I decided to obtain old medical records.  Initial Assessment:   63 yo m, PMH as above, now presenting with R herpes zoster opthalmicus    Initially considered aseptic meningitis 2/2 VZV but pt reports that HA has resolved and pain now localized to eye and skin  ED Management:  Optho consulted  Acyclovir IV dose ordered                     ED Course as of Apr 26 0702   Sat Apr 25, 2020   1026 Optho has completed evaluation.  Pt does have some pseudo-dendrites on eye exam.  They recommend outpatient management with artificial tears q2 hours, erythromycin ointment BID and then I will rx oral valtrex.  They will schedule close outpatient f/u in their clinic this week.    [AS]      ED Course User Index  [AS] Irais Blood MD                Clinical Impression:       ICD-10-CM ICD-9-CM   1. Herpes zoster with ophthalmic complication, unspecified herpes zoster eye disease B02.30 053.29             ED Disposition Condition    Discharge Stable        ED Prescriptions     Medication Sig Dispense Start Date End Date Auth. Provider    valACYclovir (VALTREX) 1000 MG tablet Take 1 tablet (1,000 mg total) by mouth 3 (three) times daily. for 7 days 21 tablet 4/25/2020 5/2/2020 Irais Blood MD    dextran 70-hypromellose (TEARS) ophthalmic solution Place 1 drop into the right eye every 2 (two) hours. PLEASE BUY PRESERVATIVE FREE DROPS 30 mL 4/25/2020  Irais Blood MD    erythromycin (ROMYCIN) ophthalmic ointment Place a 1/2 inch ribbon of ointment into the lower eyelid BID 1 Tube 4/25/2020  Irais Blood MD    oxyCODONE-acetaminophen (PERCOCET) 5-325 mg per tablet Take 1 tablet by mouth every 4 (four) hours as needed for Pain. 12 tablet 4/25/2020  Irais Blood MD        Follow-up Information     Follow up With Specialties Details  Why Contact Info Additional Information    [facility] Ophthalmology Schedule an appointment as soon as possible for a visit   [address]  [facility] [address] [phone] The Optometry department is located on the 10th floor. If you are seeing Dr. Greene, her clinic is located in the [facility] on the 1st floor.                                     Irais Blood MD  04/26/20 0702

## 2020-04-25 NOTE — CONSULTS
Consultation Report  Ophthalmology Service    Date: 04/25/2020    Chief complaint/Reason for Consult: herpes zoster ophthalmicus, right       History of Present Illness: Anthony Hanna is a 64 y.o. male who presents with 1 week of headache, new rash, and eye pain. The patient reports that on Monday he started with right sided headache. On Thursday the patient continued with headache and noted his right eye felt bruised and his lid was swollen. He was treated with Medrol dose pack and clindamycin per PCP virtual visit. Today he awoke, and noted rash on his right upper forehead and lid. He reports dull achy eye pain (eye feels tender to touch) and intermittent sharp pain that happens a few times per day. Denies vision changes, flashes, floaters, pain with EOM, curtain/veil.       POcularHx: Wears glasses.     Current eye gtts: none      PMHx:  has a past medical history of Ankle fracture, Cataract, Essential hypertension, HLD (hyperlipidemia), and Nuclear sclerosis - Both Eyes (04/16/2013).     PSurgHx:  has a past surgical history that includes Back surgery; Fracture surgery; right ankle; and Colonoscopy (N/A, 12/11/2018).     Home Medications:   Prior to Admission medications    Medication Sig Start Date End Date Taking? Authorizing Provider   aspirin (ECOTRIN) 81 MG EC tablet Take 81 mg by mouth once daily.   Yes Historical Provider, MD   atorvastatin (LIPITOR) 20 MG tablet Take 1 tablet (20 mg total) by mouth once daily. 12/4/19  Yes Zhang Denise MD   candesartan (ATACAND) 32 MG tablet Take 1 tablet (32 mg total) by mouth once daily. 3/30/20 3/30/21 Yes Zhang Denise MD   citalopram (CELEXA) 40 MG tablet Take 1 tablet (40 mg total) by mouth once daily. 12/4/19  Yes Zhang Denise MD   clindamycin (CLEOCIN) 300 MG capsule Take 1 capsule (300 mg total) by mouth 3 (three) times daily. for 7 days 4/23/20 4/30/20 Yes Zhang Denise MD   methylPREDNISolone (MEDROL DOSEPACK) 4 mg tablet Take as directed 4/23/20  Yes Zhang Denise MD    fluticasone (FLONASE) 50 mcg/actuation nasal spray 1 spray (50 mcg total) by Each Nare route once daily. 10/22/18   hZang Denise MD   ketoconazole (NIZORAL) 2 % cream Apply topically 2 (two) times daily. 12/4/19   Zhang Denise MD   varenicline (CHANTIX) 1 mg Tab Take 1 tablet (1 mg total) by mouth 2 (two) times daily. CX 12/2/19   Ash Boston MD        Medications this encounter:    acyclovir  10 mg/kg (Ideal) Intravenous ED 1 Time       Allergies: is allergic to pcn [penicillins].     Social:  reports that he quit smoking about 3 months ago. His smoking use included cigarettes. He smoked 0.25 packs per day. He has never used smokeless tobacco. He reports that he drinks alcohol. He reports that he does not use drugs.     Family Hx: Mother has glaucoma. family history includes Glaucoma in his mother; Heart disease in his maternal grandfather, maternal grandmother, and mother; Hyperlipidemia in his brother; Hypertension in his brother; No Known Problems in his father, maternal aunt, maternal uncle, paternal aunt, paternal grandfather, paternal grandmother, paternal uncle, and sister; Stroke in his mother.     ROS: Negative x 10 except for complaints as described in HPI; negative for fever, chills, weight loss, nausea, vomiting, diarrhea, shortness of breath, nasal discharge, cough, abdominal pain, dyspnea, difficulty moving arms and legs, confusion, dysuria, palpitations, or chest pain     Ocular examination/Dilated fundus examination:  Base Eye Exam     Visual Acuity (Near Card)       Right Left    Near cc 20/20 20/20          Tonometry (Tonopen, 9:55 AM)       Right Left    Pressure 21 24          Pupils       Dark Light Shape React APD    Right 4 2 Round Brisk None    Left 4 2 Round Brisk None          Visual Fields       Right Left     Full Full          Extraocular Movement       Right Left     Full, Ortho Full, Ortho          Neuro/Psych     Oriented x3:  Yes    Mood/Affect:  Normal          Dilation      Both eyes:  1% Mydriacyl, 2.5% Phenylephrine @ 9:54 AM            Slit Lamp and Fundus Exam     External Exam       Right Left    External Raised erythematous lesions in a V1 distribution, negative orourke sign Normal          Slit Lamp Exam       Right Left    Lids/Lashes Few vesicles on upper lid. Skin tags. Mild ptosis Normal    Conjunctiva/Sclera White and quiet White and quiet    Cornea Few punctate areas of negative staining that may represent early pseudodendrite formation Clear    Anterior Chamber Deep and quiet Deep and quiet    Iris Round and pharm dilated Round and pharm dilated    Lens Clear Clear    Vitreous Normal Normal          Fundus Exam       Right Left    Disc pink, sharp pink, sharp    C/D Ratio 0.2 0.2    Macula flat, good FLR flat, good FLR    Vessels Normal Normal    Periphery Normal Normal                Assessment/Plan:   1. Herpes Zoster, right upper face  - Active vesicles on right forehead.   - Good visual acuity, no intraocular involvement  - Possible early pseudodendrite formation  - Start Preservative Free Artificial tears, right eye Q2 hours while awake  - Start Erythromycin ointment, right eye BID  - Oral antivirals per ED (either acyclovir or valacyclovir)  - Pain control per ED    2. Increased IOP  - +family history of glaucoma  - Patient needs follow up with ophthalmology to evaluate for glaucoma.     We will follow up with patient early this week. Someone from the ophthalmology clinic will call patient for appointment.     If there are further questions, please page the on call ophthalmology resident.    Martine Roberts MD  PGY2, Ophthalmology Resident  04/25/2020  9:56 AM

## 2020-04-25 NOTE — ED TRIAGE NOTES
Weeded his garden a few days before a swelling and  rash on rt forehead began, 5 days ago and a bad h/a that radiated to rt eye. . Rash does not itch.  No visual disturbances.  Referred to ER after virtual visit w/ Dr Denise. C/O H/A since Monday. Already on a medrol dose pack and cleocin.

## 2020-04-25 NOTE — ED NOTES
LOC: The patient is awake and alert; oriented x 3 and speaking appropriately.  APPEARANCE: Patient resting comfortably, patient is clean and well groomed  SKIN: warm and dry, normal skin turgor & moist mucus membranes, skin intact, no breakdown noted.raised rash above rt eye x 5 days.   MUSCULOSKELETAL: Patient moving all extremities well, no obvious swelling or deformities noted  RESPIRATORY: Airway is open and patent,respirations are spontaneous, normal effort and rate  CARDIAC: Patient has a normal rate, no peripheral edema noted, capillary refill < 3 seconds; No complaints of chest pain   ABDOMEN: Soft and non tender to palpation, no distention noted.

## 2020-04-26 RX ORDER — PREGABALIN 25 MG/1
25 CAPSULE ORAL 2 TIMES DAILY
Qty: 60 CAPSULE | Refills: 0 | Status: SHIPPED | OUTPATIENT
Start: 2020-04-26 | End: 2021-02-23

## 2020-04-27 ENCOUNTER — PATIENT OUTREACH (OUTPATIENT)
Dept: ADMINISTRATIVE | Facility: OTHER | Age: 65
End: 2020-04-27

## 2020-04-28 ENCOUNTER — PATIENT MESSAGE (OUTPATIENT)
Dept: INTERNAL MEDICINE | Facility: CLINIC | Age: 65
End: 2020-04-28

## 2020-04-28 ENCOUNTER — OFFICE VISIT (OUTPATIENT)
Dept: OPHTHALMOLOGY | Facility: CLINIC | Age: 65
End: 2020-04-28
Payer: MEDICARE

## 2020-04-28 DIAGNOSIS — B02.30 HERPES ZOSTER OPHTHALMICUS OF RIGHT EYE: Primary | ICD-10-CM

## 2020-04-28 PROCEDURE — 99999 PR PBB SHADOW E&M-EST. PATIENT-LVL II: ICD-10-PCS | Mod: PBBFAC,,, | Performed by: OPHTHALMOLOGY

## 2020-04-28 PROCEDURE — 92012 PR EYE EXAM, EST PATIENT,INTERMED: ICD-10-PCS | Mod: S$PBB,,, | Performed by: OPHTHALMOLOGY

## 2020-04-28 PROCEDURE — 99999 PR PBB SHADOW E&M-EST. PATIENT-LVL II: CPT | Mod: PBBFAC,,, | Performed by: OPHTHALMOLOGY

## 2020-04-28 PROCEDURE — 99212 OFFICE O/P EST SF 10 MIN: CPT | Mod: PBBFAC | Performed by: OPHTHALMOLOGY

## 2020-04-28 PROCEDURE — 92012 INTRM OPH EXAM EST PATIENT: CPT | Mod: S$PBB,,, | Performed by: OPHTHALMOLOGY

## 2020-04-28 NOTE — PATIENT INSTRUCTIONS
Finish Valtrex.  Continue ointment and artificial tears as desired.   Return in one to two weeks if concerns.

## 2020-04-28 NOTE — PROGRESS NOTES
HPI     Follow-up      Additional comments: Herpes zoster               Comments     Patient here for ED follow up OD Herpes zoster.  Pt states OD feeling terrible. Very painful.  No itchy or burning. Vision fine.  8 on pain scale.    Eye drops:Erythromycin BID OD                    AT's q2h    I have personally interviewed the patient, reviewed the history and   examined the patient and agree with the technician's exam.          Last edited by Pito Rod MD on 4/28/2020  9:21 AM. (History)            Assessment /Plan     For exam results, see Encounter Report.    Herpes zoster ophthalmicus of right eye      Persistent pseudodendrite of right cornea. No intraocular involvement. Finish Valtrex. Continue artificial tears and erythromycin ointment as desired. Return in one to two weeks if not resolved or concerns.

## 2020-04-30 ENCOUNTER — PATIENT MESSAGE (OUTPATIENT)
Dept: INTERNAL MEDICINE | Facility: CLINIC | Age: 65
End: 2020-04-30

## 2020-09-01 ENCOUNTER — PATIENT MESSAGE (OUTPATIENT)
Dept: INTERNAL MEDICINE | Facility: CLINIC | Age: 65
End: 2020-09-01

## 2020-09-01 DIAGNOSIS — L29.9 PRURITUS: Primary | ICD-10-CM

## 2020-09-01 NOTE — TELEPHONE ENCOUNTER
Requesting referral for Derm due to intense itching after activity. See email, has had issue before but doesn't recall what dermatologist did but it did resolve. Benadryl causes sleepiness so unable to take.

## 2020-09-04 ENCOUNTER — OFFICE VISIT (OUTPATIENT)
Dept: DERMATOLOGY | Facility: CLINIC | Age: 65
End: 2020-09-04
Payer: MEDICARE

## 2020-09-04 DIAGNOSIS — L98.9 DISEASE OF SKIN AND SUBCUTANEOUS TISSUE: ICD-10-CM

## 2020-09-04 DIAGNOSIS — L29.9 PRURITUS: Primary | ICD-10-CM

## 2020-09-04 PROCEDURE — 99202 OFFICE O/P NEW SF 15 MIN: CPT | Mod: 95,,, | Performed by: DERMATOLOGY

## 2020-09-04 PROCEDURE — 99202 PR OFFICE/OUTPT VISIT, NEW, LEVL II, 15-29 MIN: ICD-10-PCS | Mod: 95,,, | Performed by: DERMATOLOGY

## 2020-09-04 RX ORDER — TRIAMCINOLONE ACETONIDE 1 MG/G
CREAM TOPICAL
Qty: 454 G | Refills: 0 | Status: SHIPPED | OUTPATIENT
Start: 2020-09-04

## 2020-09-04 NOTE — PATIENT INSTRUCTIONS
- Sarna Sensitive (pramoxine) over the counter as often as needed for itching. Can keep in the fridge to maximize effect.  - Start a non-sedating daily antihistamine such as Zyrtec, Xyzal, or Allegra  - Dry skin recommendations below    XEROSIS (DRY SKIN)        1. Definition    Xerosis is the term for dry skin.  We all have a natural oil coating over our skin produced by the skin oil glands.  If this oil is removed, the skin becomes dry which can lead to cracking, which can lead to inflammation.  Xerosis is usually a long-term problem that recurs often, especially in the winter.    2. Cause     Long hot baths or showers can remove our natural oil and lead to xerosis.  One should never take more than one bath or shower a day and for no longer than ten minutes.   Use of harsh soaps such as Zest, Dial, and Ivory can worsen and cause xerosis.   Cold winter weather worsens xerosis because the amount of moisture contained in cold air is much less than the amount of moisture in warm air.    3. Treatment     Treatment is intended to restore the natural oil to your skin.  Keep the skin lubricated.     Do not take more than one bath or shower a day.  Use lukewarm/cool water, not hot.  Hot water dries out the skin.     Use a gentle moisturizing soap such as Cetaphil soap, Oil of Olay, Dove for sensitive skin, Basis, Ivory moisture care, Restoraderm cleanser.     When toweling dry, dont rub.  Blot the skin so there is still some water left on the skin.  You should apply a moisturizing cream to all of the skin such as Cerave cream, Vanicream cream, Cetaphil cream, Restoraderm or Eucerin Original Formula cream.   Alpha hydroxyacid lotions, i.e., AmLactin, also work very well for preventing dry skin, but may burn when used on inflamed or reddened skin.     If you like to swim during the winter months, you should not use soap when getting out of the pool.  When you have finished swimming, rinse off the chlorine with cool  to warm water.  If this will be the only shower of the day, then you may use Cetaphil or another mild soap to cleanse your skin.  After the shower, apply a moisturizing cream to all of the skin as above.

## 2020-09-04 NOTE — PROGRESS NOTES
Subjective:       Patient ID:  Anthony Hanna is a 64 y.o. male who presents for No chief complaint on file.    The patient location is: [place]  The chief complaint leading to consultation is: itching    Visit type: audiovisual    Face to Face time with patient: 33 minutes  35 minutes of total time spent on the encounter, which includes face to face time and non-face to face time preparing to see the patient (eg, review of tests), Obtaining and/or reviewing separately obtained history, Documenting clinical information in the electronic or other health record, Independently interpreting results (not separately reported) and communicating results to the patient/family/caregiver, or Care coordination (not separately reported).         Each patient to whom he or she provides medical services by telemedicine is:  (1) informed of the relationship between the physician and patient and the respective role of any other health care provider with respect to management of the patient; and (2) notified that he or she may decline to receive medical services by telemedicine and may withdraw from such care at any time.    Notes:   History of Present Illness: The patient presents with chief complaint of itching.  Location: whole body (torso, arms, legs, face/scalp)  Duration: 1 month  Signs/Symptoms: occurs after sweating; has a light itch at baseline; denies any rash with the itching except for areas where his skin turns white and looks dry after scratching    Prior treatments: Benadryl - works but makes him sleepy    Reports 10-15 years ago had similar presentation, saw a dermatologist and thinks it resolved after cool showers and can't remember what else was prescribed.    Patient complains of rash  Location: hands, knees, back of ankles  Duration: present for years on his hands, then 6-8 months ago appeared on other places  Symptoms: redness, itching, scaling  Relieving factors/Previous treatments: Cortisone 10 with some  relief    Denies nail/joints involvement.  Brothers kids have psoriasis.    Reports he is allergic to Tide. Using All Free and Clear.  Bathes with Lever soap.  Does not moisturize.    Reports a history of bad seasonal allergies that he outgrew in his 20s.           Review of Systems   Eyes: Negative for itching.   Musculoskeletal: Negative for joint swelling and arthralgias.   Skin: Positive for itching, rash and dry skin.   Allergic/Immunologic: Negative for environmental allergies (denies current allergies).        Objective:    Physical Exam   Constitutional: He appears well-developed and well-nourished. No distress.   Neurological: He is alert and oriented to person, place, and time. He is not disoriented.   Psychiatric: He has a normal mood and affect.   Skin:   Areas Examined (abnormalities noted in diagram):   RLE Inspected  LLE Inspection Performed                  Diagram Legend     Erythematous scaling macule/papule c/w actinic keratosis       Vascular papule c/w angioma      Pigmented verrucoid papule/plaque c/w seborrheic keratosis      Yellow umbilicated papule c/w sebaceous hyperplasia      Irregularly shaped tan macule c/w lentigo     1-2 mm smooth white papules consistent with Milia      Movable subcutaneous cyst with punctum c/w epidermal inclusion cyst      Subcutaneous movable cyst c/w pilar cyst      Firm pink to brown papule c/w dermatofibroma      Pedunculated fleshy papule(s) c/w skin tag(s)      Evenly pigmented macule c/w junctional nevus     Mildly variegated pigmented, slightly irregular-bordered macule c/w mildly atypical nevus      Flesh colored to evenly pigmented papule c/w intradermal nevus       Pink pearly papule/plaque c/w basal cell carcinoma      Erythematous hyperkeratotic cursted plaque c/w SCC      Surgical scar with no sign of skin cancer recurrence      Open and closed comedones      Inflammatory papules and pustules      Verrucoid papule consistent consistent with wart      Erythematous eczematous patches and plaques     Dystrophic onycholytic nail with subungual debris c/w onychomycosis     Umbilicated papule    Erythematous-base heme-crusted tan verrucoid plaque consistent with inflamed seborrheic keratosis     Erythematous Silvery Scaling Plaque c/w Psoriasis     See annotation      Assessment / Plan:        Pruritus  - + xerosis  - denies dermatographism  - considered cholinergic urticaria but denies wheals/hives with the pruritus  - discussed dry skin recommendations. Limit to once daily cool/lukewarm showers, apply moisturizer within 3 minutes of exiting. Recommend starting moisturizing with CeraVe cream/Vanicream cream to whole body twice daily.  - recommend starting a daily OTC non sedating antihistamine such as Zyrtec/Xyzal/Allegra  - recommend Sarna Sensitive (pramoxine) over the counter as needed for itching  - recommend Benadryl as needed for severe itching  - can use TAC cream BID as needed  - discussed lab eval for underlying causes of pruritus, patient declines. Consider if refractory.    Side effect profile reviewed for topical steroids including atrophy, telangiectasia and striae development with prolonged usage.  Patient acknowledged understanding.      Dermatitis  - asteatotic eczema vs psoriasis (no nail/joint involvement).  Video quality is not ideal. Discussed dry skin tips as above, start topical steroid below  - consider in person eval/biopsy if refractory  -     triamcinolone acetonide 0.1% (KENALOG) 0.1 % cream; AAA bid for 2-4 weeks per course.  Dispense: 454 g; Refill: 0             Follow up in about 4 weeks (around 10/2/2020), or if symptoms worsen or fail to improve.

## 2020-09-28 ENCOUNTER — OFFICE VISIT (OUTPATIENT)
Dept: URGENT CARE | Facility: CLINIC | Age: 65
End: 2020-09-28
Payer: MEDICARE

## 2020-09-28 VITALS
HEIGHT: 69 IN | SYSTOLIC BLOOD PRESSURE: 168 MMHG | RESPIRATION RATE: 14 BRPM | TEMPERATURE: 98 F | HEART RATE: 93 BPM | OXYGEN SATURATION: 96 % | BODY MASS INDEX: 28.14 KG/M2 | WEIGHT: 190 LBS | DIASTOLIC BLOOD PRESSURE: 101 MMHG

## 2020-09-28 DIAGNOSIS — H60.90 OTITIS EXTERNA, UNSPECIFIED CHRONICITY, UNSPECIFIED LATERALITY, UNSPECIFIED TYPE: Primary | ICD-10-CM

## 2020-09-28 PROCEDURE — 99214 PR OFFICE/OUTPT VISIT, EST, LEVL IV, 30-39 MIN: ICD-10-PCS | Mod: S$GLB,,, | Performed by: PHYSICIAN ASSISTANT

## 2020-09-28 PROCEDURE — 99214 OFFICE O/P EST MOD 30 MIN: CPT | Mod: S$GLB,,, | Performed by: PHYSICIAN ASSISTANT

## 2020-09-28 RX ORDER — NEOMYCIN SULFATE, POLYMYXIN B SULFATE, HYDROCORTISONE 3.5; 10000; 1 MG/ML; [USP'U]/ML; MG/ML
3 SOLUTION/ DROPS AURICULAR (OTIC) 3 TIMES DAILY
Qty: 1 BOTTLE | Refills: 0 | Status: SHIPPED | OUTPATIENT
Start: 2020-09-28 | End: 2020-10-08

## 2020-09-28 RX ORDER — CIPROFLOXACIN AND DEXAMETHASONE 3; 1 MG/ML; MG/ML
4 SUSPENSION/ DROPS AURICULAR (OTIC) 2 TIMES DAILY
Qty: 7.5 ML | Refills: 0 | Status: SHIPPED | OUTPATIENT
Start: 2020-09-28 | End: 2020-09-28

## 2020-09-28 NOTE — PROGRESS NOTES
"Subjective:       Patient ID: Anthony Hanna is a 64 y.o. male.    Vitals:  height is 5' 9" (1.753 m) and weight is 86.2 kg (190 lb). His temperature is 98 °F (36.7 °C). His blood pressure is 168/101 (abnormal) and his pulse is 93. His respiration is 14 and oxygen saturation is 96%.     Chief Complaint: Otalgia    C/o ear pain bilateral for 3 weeks, but worse on the left. Noticed "oily clear drainage". Pain on moving ear and touching.    Otalgia   There is pain in both ears. This is a new problem. Episode onset: 3 weeks. The problem has been unchanged. Associated symptoms include ear discharge. Pertinent negatives include no abdominal pain, coughing, diarrhea, headaches, hearing loss, neck pain, rash, rhinorrhea, sore throat or vomiting. Treatments tried: antibiotic eardrops, leftover from wife. The treatment provided moderate relief.       HENT: Positive for ear pain and ear discharge. Negative for hearing loss and sore throat.    Neck: Negative for neck pain.   Respiratory: Negative for cough.    Gastrointestinal: Negative for abdominal pain, vomiting and diarrhea.   Skin: Negative for rash.   Neurological: Negative for headaches.       Objective:      Physical Exam   Constitutional: He is oriented to person, place, and time. He appears well-developed. He is cooperative.  Non-toxic appearance. He does not appear ill. No distress.   HENT:   Head: Normocephalic and atraumatic.   Ears:   Right Ear: Hearing, tympanic membrane, external ear and ear canal normal. No lacerations. No drainage, swelling or tenderness. No foreign bodies. No mastoid tenderness. Tympanic membrane is not injected and not scarred. No middle ear effusion. No hemotympanum. No decreased hearing is noted.   Left Ear: Hearing and external ear normal. No lacerations. There is drainage (small amt, clear, white) and tenderness (on manipulation of tragus). No swelling. No foreign bodies. No mastoid tenderness. Tympanic membrane is not injected, not " scarred, not perforated, not erythematous, not retracted and not bulging.  No middle ear effusion. No hemotympanum. No decreased hearing is noted.   Eyes: Conjunctivae and lids are normal. No scleral icterus.   Neck: Trachea normal, full passive range of motion without pain and phonation normal. Neck supple. No neck rigidity. No edema and no erythema present.   Cardiovascular: Normal rate.   Pulmonary/Chest: Effort normal. No respiratory distress.   Neurological: He is alert and oriented to person, place, and time. He exhibits normal muscle tone. Coordination normal.   Skin: Skin is dry, intact, not diaphoretic and not pale. Psychiatric: His speech is normal and behavior is normal. Judgment and thought content normal.   Nursing note and vitals reviewed.        Assessment:       1. Otitis externa, unspecified chronicity, unspecified laterality, unspecified type        Plan:         Otitis externa, unspecified chronicity, unspecified laterality, unspecified type  -     ciprofloxacin-dexamethasone 0.3-0.1% (CIPRODEX) 0.3-0.1 % DrpS; Place 4 drops into the left ear 2 (two) times daily. for 7 days  Dispense: 7.5 mL; Refill: 0    Labs reviewed, pertinent imaging reviewed, previous medical records, medical history, surgical history, social history, family history reviewed.       Patient Instructions   Ear drops for infection  Follow up with ENT in 1 week if still having symptoms  Go to the emergency room for worsening      Please arrange follow up with your primary medical clinic as soon as possible. You must understand that you've received an Urgent Care treatment only and that you may be released before all of your medical problems are known or treated. You, the patient, will arrange for follow up as instructed. If your symptoms worsen or fail to improve you should go to the Emergency Room.  WE CANNOT RULE OUT ALL POSSIBLE CAUSES OF YOUR SYMPTOMS IN THE URGENT CARE SETTING PLEASE GO TO THE ER IF YOU FEELS YOUR CONDITION  IS WORSENING OR YOU WOULD LIKE EMERGENT EVALUATION.    Please return here or go to the Emergency Department for any concerns or worsening of condition.  If you were prescribed antibiotics, please take them to completion.  If you were prescribed a narcotic medication, do not drive or operate heavy equipment or machinery while taking these medications.  Please follow up with your primary care doctor or specialist as needed.    If you  smoke, please stop smoking.      External Ear Infection (Adult)    External otitis (also called swimmers ear) is an infection in the ear canal. It is often caused by bacteria or fungus. It can occur a few days after water gets trapped in the ear canal (from swimming or bathing). It can also occur after cleaning too deeply in the ear canal with a cotton swab or other object. Sometimes, hair care products get into the ear canal and cause this problem.  Symptoms can include pain, fever, itching, redness, drainage, or swelling of the ear canal. Temporary hearing loss may also occur.  Home care  · Do not try to clean the ear canal. This can push pus and bacteria deeper into the canal.  · Use prescribed ear drops as directed. These help reduce swelling and fight the infection. If an ear wick was placed in the ear canal, apply drops right onto the end of the wick. The wick will draw the medication into the ear canal even if it is swollen closed.  · A cotton ball may be loosely placed in the outer ear to absorb any drainage.  · You may use acetaminophen or ibuprofen to control pain, unless another medication was prescribed. Note: If you have chronic liver or kidney disease or ever had a stomach ulcer or GI bleeding, talk to your health care provider before taking any of these medications.  · Do not allow water to get into your ear when bathing. Also, avoid swimming until the infection has cleared.  Prevention  · Keep your ears dry. This helps lower the risk of infection. Dry your ears with a  towel or hair dryer after getting wet. Also, use ear plugs when swimming.  · Do not stick any objects in the ear to remove wax.  · If you feel water trapped in your ear, use ear drops right away. You can get these drops over the counter at most drugstores. They work by removing water from the ear canal.  Follow-up care  Follow up with your health care provider in one week, or as advised.  When to seek medical advice  Call your health care provider right away if any of these occur:  · Ear pain becomes worse or doesnt improve after 3 days of treatment  · Redness or swelling of the outer ear occurs or gets worse  · Headache  · Painful or stiff neck  · Drowsiness or confusion  · Fever of 100.4ºF (38ºC) or higher, or as directed by your health care provider  · Seizure  Date Last Reviewed: 3/22/2015  © 1684-9914 [employer]. [address] [place] [address], [place] [address]. All rights reserved. This information is not intended as a substitute for professional medical care. Always follow your healthcare professional's instructions.

## 2020-09-28 NOTE — PATIENT INSTRUCTIONS
Ear drops for infection  Follow up with ENT in 1 week if still having symptoms  Go to the emergency room for worsening      Please arrange follow up with your primary medical clinic as soon as possible. You must understand that you've received an Urgent Care treatment only and that you may be released before all of your medical problems are known or treated. You, the patient, will arrange for follow up as instructed. If your symptoms worsen or fail to improve you should go to the Emergency Room.  WE CANNOT RULE OUT ALL POSSIBLE CAUSES OF YOUR SYMPTOMS IN THE URGENT CARE SETTING PLEASE GO TO THE ER IF YOU FEELS YOUR CONDITION IS WORSENING OR YOU WOULD LIKE EMERGENT EVALUATION.    Please return here or go to the Emergency Department for any concerns or worsening of condition.  If you were prescribed antibiotics, please take them to completion.  If you were prescribed a narcotic medication, do not drive or operate heavy equipment or machinery while taking these medications.  Please follow up with your primary care doctor or specialist as needed.    If you  smoke, please stop smoking.      External Ear Infection (Adult)    External otitis (also called swimmers ear) is an infection in the ear canal. It is often caused by bacteria or fungus. It can occur a few days after water gets trapped in the ear canal (from swimming or bathing). It can also occur after cleaning too deeply in the ear canal with a cotton swab or other object. Sometimes, hair care products get into the ear canal and cause this problem.  Symptoms can include pain, fever, itching, redness, drainage, or swelling of the ear canal. Temporary hearing loss may also occur.  Home care  · Do not try to clean the ear canal. This can push pus and bacteria deeper into the canal.  · Use prescribed ear drops as directed. These help reduce swelling and fight the infection. If an ear wick was placed in the ear canal, apply drops right onto the end of the wick. The  wick will draw the medication into the ear canal even if it is swollen closed.  · A cotton ball may be loosely placed in the outer ear to absorb any drainage.  · You may use acetaminophen or ibuprofen to control pain, unless another medication was prescribed. Note: If you have chronic liver or kidney disease or ever had a stomach ulcer or GI bleeding, talk to your health care provider before taking any of these medications.  · Do not allow water to get into your ear when bathing. Also, avoid swimming until the infection has cleared.  Prevention  · Keep your ears dry. This helps lower the risk of infection. Dry your ears with a towel or hair dryer after getting wet. Also, use ear plugs when swimming.  · Do not stick any objects in the ear to remove wax.  · If you feel water trapped in your ear, use ear drops right away. You can get these drops over the counter at most drugstores. They work by removing water from the ear canal.  Follow-up care  Follow up with your health care provider in one week, or as advised.  When to seek medical advice  Call your health care provider right away if any of these occur:  · Ear pain becomes worse or doesnt improve after 3 days of treatment  · Redness or swelling of the outer ear occurs or gets worse  · Headache  · Painful or stiff neck  · Drowsiness or confusion  · Fever of 100.4ºF (38ºC) or higher, or as directed by your health care provider  · Seizure  Date Last Reviewed: 3/22/2015  © 3253-5127 [employer]. [address] [place] [address], [place] [address]. All rights reserved. This information is not intended as a substitute for professional medical care. Always follow your healthcare professional's instructions.

## 2020-09-29 ENCOUNTER — TELEPHONE (OUTPATIENT)
Dept: URGENT CARE | Facility: CLINIC | Age: 65
End: 2020-09-29

## 2020-09-30 NOTE — TELEPHONE ENCOUNTER
Pt called and states ciprodex not covered by insurance. Will call in cortisporin as TM completely intact. All questions answered

## 2020-10-01 ENCOUNTER — PATIENT MESSAGE (OUTPATIENT)
Dept: OTHER | Facility: OTHER | Age: 65
End: 2020-10-01

## 2020-12-01 ENCOUNTER — CLINICAL SUPPORT (OUTPATIENT)
Dept: SMOKING CESSATION | Facility: CLINIC | Age: 65
End: 2020-12-01
Payer: COMMERCIAL

## 2020-12-01 DIAGNOSIS — F17.200 NICOTINE DEPENDENCE: Primary | ICD-10-CM

## 2020-12-01 DIAGNOSIS — F17.210 LIGHT CIGARETTE SMOKER (1-9 CIGARETTES PER DAY): ICD-10-CM

## 2020-12-01 PROCEDURE — 99404 PR PREVENT COUNSEL,INDIV,60 MIN: ICD-10-PCS | Mod: S$GLB,,,

## 2020-12-01 PROCEDURE — 99404 PREV MED CNSL INDIV APPRX 60: CPT | Mod: S$GLB,,,

## 2020-12-01 RX ORDER — VARENICLINE TARTRATE 1 MG/1
1 TABLET, FILM COATED ORAL 2 TIMES DAILY
Qty: 58 TABLET | Refills: 0 | Status: SHIPPED | OUTPATIENT
Start: 2020-12-01 | End: 2020-12-30 | Stop reason: SDUPTHER

## 2020-12-01 NOTE — Clinical Note
Just a note to advise how the patient is progressing in the tobacco cessation program.    FTND score of 2 indicates a low dependence to Nicotine.  Patient currently not smoking, quit 1 month ago and here for support and medication.  LEOPOLDO-D score of 10 is perceived as no mental distress or depression at this time.

## 2020-12-10 ENCOUNTER — TELEPHONE (OUTPATIENT)
Dept: SMOKING CESSATION | Facility: CLINIC | Age: 65
End: 2020-12-10

## 2020-12-10 NOTE — TELEPHONE ENCOUNTER
Patient called for today's appointment.  Patient stated that his car was being serviced and he had to cancel today's appointment.  We confirmed next weeks phone session.

## 2020-12-11 ENCOUNTER — PATIENT MESSAGE (OUTPATIENT)
Dept: OTHER | Facility: OTHER | Age: 65
End: 2020-12-11

## 2020-12-17 ENCOUNTER — CLINICAL SUPPORT (OUTPATIENT)
Dept: SMOKING CESSATION | Facility: CLINIC | Age: 65
End: 2020-12-17
Payer: COMMERCIAL

## 2020-12-17 DIAGNOSIS — F17.200 NICOTINE DEPENDENCE: Primary | ICD-10-CM

## 2020-12-17 PROCEDURE — 99402 PR PREVENT COUNSEL,INDIV,30 MIN: ICD-10-PCS | Mod: S$GLB,,,

## 2020-12-17 PROCEDURE — 99402 PREV MED CNSL INDIV APPRX 30: CPT | Mod: S$GLB,,,

## 2020-12-17 NOTE — Clinical Note
Just a note to advise how the patient is progressing in the tobacco cessation program.  Patient states that he remains tobacco free.  I congratulated the patient for his continued success.  The patient states that he went out fishing with his brothers this past weekend, and he had strong urges to smoke.  We discussed that urges will come and it's important to change what he is doing in the moment when they arise.  The patient states that he enjoys the feeling of not hiding his smoking from his wife.  We  Discussed the 7000 chemicals and 60 carcinogens in each cigarette.  The patient remains on the prescribed tobacco cessation medication regimen of 1 mg Chantix BID without any negative side effects at this time. The patient will continue with therapy sessions and medication monitoring by CTTS. Prescribed medication management will be by physician.

## 2020-12-17 NOTE — PROGRESS NOTES
Individual Follow-Up Form    12/17/2020    Quit Date:     Clinical Status of Patient: Outpatient    Length of Service: 30 minutes    Continuing Medication: yes  Chantix    Other Medications:      Target Symptoms: Withdrawal and medication side effects. The following were  rated moderate (3) to severe (4) on TCRS:  · Moderate (3): none  · Severe (4): none    Comments: completion of TCRS (Tobacco Cessation Rating Scale) reviewed strategies, habitual behavior, high risks situations, understanding urges and cravings, stress and relaxation with open discussion and additional interventions, Introduced lapses, relapses, understanding them and analyzing the situation of a lapse, conflict issues that may be linked to a lapse.  Patient states that he remains tobacco free.  I congratulated the patient for his continued success.  The patient states that he went out fishing with his brothers this past weekend, and he had strong urges to smoke.  We discussed that urges will come and it's important to change what he is doing in the moment when they arise.  The patient states that he enjoys the feeling of not hiding his smoking from his wife.  We  Discussed the 7000 chemicals and 60 carcinogens in each cigarette.  The patient remains on the prescribed tobacco cessation medication regimen of 1 mg Chantix BID without any negative side effects at this time. The patient will continue with therapy sessions and medication monitoring by CTTS. Prescribed medication management will be by physician.       Diagnosis: F17.200    Next Visit: 2 weeks

## 2020-12-30 ENCOUNTER — CLINICAL SUPPORT (OUTPATIENT)
Dept: SMOKING CESSATION | Facility: CLINIC | Age: 65
End: 2020-12-30
Payer: COMMERCIAL

## 2020-12-30 DIAGNOSIS — F17.200 NICOTINE DEPENDENCE: Primary | ICD-10-CM

## 2020-12-30 DIAGNOSIS — F17.210 LIGHT CIGARETTE SMOKER (1-9 CIGARETTES PER DAY): ICD-10-CM

## 2020-12-30 PROCEDURE — 99402 PREV MED CNSL INDIV APPRX 30: CPT | Mod: S$GLB,,,

## 2020-12-30 PROCEDURE — 99402 PR PREVENT COUNSEL,INDIV,30 MIN: ICD-10-PCS | Mod: S$GLB,,,

## 2020-12-30 RX ORDER — VARENICLINE TARTRATE 1 MG/1
1 TABLET, FILM COATED ORAL 2 TIMES DAILY
Qty: 60 TABLET | Refills: 0 | Status: SHIPPED | OUTPATIENT
Start: 2021-01-06 | End: 2021-06-29 | Stop reason: SDUPTHER

## 2020-12-30 NOTE — Clinical Note
Just a note to advise how the patient is progressing in the tobacco cessation program.   Patient states that he remains tobacco free.  I congratulated the patient for his continued success.  Patient states that he still has occasional urges, but they do not last long.  The patient states that he is avoiding smokers and enjoys being a non-smoker.  We discussed the importance of taking Chantix BID and the benefits of having the drug in your system in case of relapse.  The patient remains on the prescribed tobacco cessation medication regimen of 1 mg Chantix BID without any negative side effects at this time. The patient will continue with therapy sessions and medication monitoring by CTTS. Prescribed medication management will be by physician.

## 2020-12-30 NOTE — PROGRESS NOTES
Individual Follow-Up Form    12/30/2020    Quit Date: 10/31/20    Clinical Status of Patient: Outpatient    Length of Service: 30 minutes    Continuing Medication: yes  Chantix    Other Medications:      Target Symptoms: Withdrawal and medication side effects. The following were  rated moderate (3) to severe (4) on TCRS:  · Moderate (3): none  · Severe (4): none    Comments: completion of TCRS (Tobacco Cessation Rating Scale) reviewed strategies, habitual behavior, high risks situations, understanding urges and cravings, stress and relaxation with open discussion and additional interventions, Introduced lapses, relapses, understanding them and analyzing the situation of a lapse, conflict issues that may be linked to a lapse.  Patient states that he remains tobacco free.  I congratulated the patient for his continued success.  Patient states that he still has occasional urges, but they do not last long.  The patient states that he is avoiding smokers and enjoys being a non-smoker.  We discussed the importance of taking Chantix BID and the benefits of having the drug in your system in case of relapse.  The patient remains on the prescribed tobacco cessation medication regimen of 1 mg Chantix BID without any negative side effects at this time. The patient will continue with therapy sessions and medication monitoring by CTTS. Prescribed medication management will be by physician.     Diagnosis: F17.200    Next Visit: 1 week

## 2021-01-07 ENCOUNTER — CLINICAL SUPPORT (OUTPATIENT)
Dept: SMOKING CESSATION | Facility: CLINIC | Age: 66
End: 2021-01-07
Payer: COMMERCIAL

## 2021-01-07 DIAGNOSIS — F17.200 NICOTINE DEPENDENCE: Primary | ICD-10-CM

## 2021-01-07 PROCEDURE — 99403 PR PREVENT COUNSEL,INDIV,45 MIN: ICD-10-PCS | Mod: S$GLB,,,

## 2021-01-07 PROCEDURE — 99403 PREV MED CNSL INDIV APPRX 45: CPT | Mod: S$GLB,,,

## 2021-01-14 ENCOUNTER — CLINICAL SUPPORT (OUTPATIENT)
Dept: SMOKING CESSATION | Facility: CLINIC | Age: 66
End: 2021-01-14
Payer: COMMERCIAL

## 2021-01-14 DIAGNOSIS — F17.200 NICOTINE DEPENDENCE: Primary | ICD-10-CM

## 2021-01-14 PROCEDURE — 99402 PR PREVENT COUNSEL,INDIV,30 MIN: ICD-10-PCS | Mod: S$GLB,,,

## 2021-01-14 PROCEDURE — 99402 PREV MED CNSL INDIV APPRX 30: CPT | Mod: S$GLB,,,

## 2021-01-21 ENCOUNTER — TELEPHONE (OUTPATIENT)
Dept: SMOKING CESSATION | Facility: CLINIC | Age: 66
End: 2021-01-21

## 2021-01-26 ENCOUNTER — TELEPHONE (OUTPATIENT)
Dept: SMOKING CESSATION | Facility: CLINIC | Age: 66
End: 2021-01-26

## 2021-02-01 ENCOUNTER — OFFICE VISIT (OUTPATIENT)
Dept: DERMATOLOGY | Facility: CLINIC | Age: 66
End: 2021-02-01
Payer: MEDICARE

## 2021-02-01 DIAGNOSIS — L98.9 DISEASE OF SKIN AND SUBCUTANEOUS TISSUE: Primary | ICD-10-CM

## 2021-02-01 DIAGNOSIS — L29.9 PRURITUS: ICD-10-CM

## 2021-02-01 PROCEDURE — 99214 OFFICE O/P EST MOD 30 MIN: CPT | Mod: 25,S$PBB,, | Performed by: DERMATOLOGY

## 2021-02-01 PROCEDURE — 99214 PR OFFICE/OUTPT VISIT, EST, LEVL IV, 30-39 MIN: ICD-10-PCS | Mod: 25,S$PBB,, | Performed by: DERMATOLOGY

## 2021-02-01 PROCEDURE — 88305 TISSUE EXAM BY PATHOLOGIST: CPT | Mod: 26,,, | Performed by: PATHOLOGY

## 2021-02-01 PROCEDURE — 99213 OFFICE O/P EST LOW 20 MIN: CPT | Mod: PBBFAC,PN | Performed by: DERMATOLOGY

## 2021-02-01 PROCEDURE — 88305 TISSUE EXAM BY PATHOLOGIST: ICD-10-PCS | Mod: 26,,, | Performed by: PATHOLOGY

## 2021-02-01 PROCEDURE — 88312 SPECIAL STAINS GROUP 1: CPT | Performed by: PATHOLOGY

## 2021-02-01 PROCEDURE — 88305 TISSUE EXAM BY PATHOLOGIST: CPT | Performed by: PATHOLOGY

## 2021-02-01 PROCEDURE — 99999 PR PBB SHADOW E&M-EST. PATIENT-LVL III: ICD-10-PCS | Mod: PBBFAC,,, | Performed by: DERMATOLOGY

## 2021-02-01 PROCEDURE — 99999 PR PBB SHADOW E&M-EST. PATIENT-LVL III: CPT | Mod: PBBFAC,,, | Performed by: DERMATOLOGY

## 2021-02-01 PROCEDURE — 88312 PR  SPECIAL STAINS,GROUP I: ICD-10-PCS | Mod: 26,,, | Performed by: PATHOLOGY

## 2021-02-01 PROCEDURE — 88312 SPECIAL STAINS GROUP 1: CPT | Mod: 26,,, | Performed by: PATHOLOGY

## 2021-02-01 RX ORDER — PREDNISONE 20 MG/1
TABLET ORAL
Qty: 24 TABLET | Refills: 0 | Status: SHIPPED | OUTPATIENT
Start: 2021-02-01 | End: 2021-03-10

## 2021-02-01 RX ORDER — TRIAMCINOLONE ACETONIDE 1 MG/G
CREAM TOPICAL
Qty: 454 G | Refills: 3 | Status: SHIPPED | OUTPATIENT
Start: 2021-02-01 | End: 2021-03-10

## 2021-02-02 ENCOUNTER — TELEPHONE (OUTPATIENT)
Dept: SMOKING CESSATION | Facility: CLINIC | Age: 66
End: 2021-02-02

## 2021-02-08 ENCOUNTER — PATIENT MESSAGE (OUTPATIENT)
Dept: INTERNAL MEDICINE | Facility: CLINIC | Age: 66
End: 2021-02-08

## 2021-02-08 RX ORDER — ATORVASTATIN CALCIUM 20 MG/1
20 TABLET, FILM COATED ORAL DAILY
Qty: 90 TABLET | Refills: 0 | Status: SHIPPED | OUTPATIENT
Start: 2021-02-08 | End: 2021-06-02 | Stop reason: SDUPTHER

## 2021-02-08 RX ORDER — CANDESARTAN 32 MG/1
32 TABLET ORAL DAILY
Qty: 90 TABLET | Refills: 0 | Status: SHIPPED | OUTPATIENT
Start: 2021-02-08 | End: 2021-03-10

## 2021-02-08 RX ORDER — CITALOPRAM 40 MG/1
40 TABLET, FILM COATED ORAL DAILY
Qty: 90 TABLET | Refills: 0 | Status: SHIPPED | OUTPATIENT
Start: 2021-02-08 | End: 2021-06-02 | Stop reason: SDUPTHER

## 2021-02-09 LAB
FINAL PATHOLOGIC DIAGNOSIS: NORMAL
GROSS: NORMAL
MICROSCOPIC EXAM: NORMAL

## 2021-02-10 ENCOUNTER — CLINICAL SUPPORT (OUTPATIENT)
Dept: SMOKING CESSATION | Facility: CLINIC | Age: 66
End: 2021-02-10
Payer: COMMERCIAL

## 2021-02-10 ENCOUNTER — PATIENT MESSAGE (OUTPATIENT)
Dept: INTERNAL MEDICINE | Facility: CLINIC | Age: 66
End: 2021-02-10

## 2021-02-10 DIAGNOSIS — R73.01 IMPAIRED FASTING BLOOD SUGAR: ICD-10-CM

## 2021-02-10 DIAGNOSIS — Z12.5 PROSTATE CANCER SCREENING: ICD-10-CM

## 2021-02-10 DIAGNOSIS — I10 ESSENTIAL HYPERTENSION: ICD-10-CM

## 2021-02-10 DIAGNOSIS — F17.200 NICOTINE DEPENDENCE: Primary | ICD-10-CM

## 2021-02-10 DIAGNOSIS — E78.49 OTHER HYPERLIPIDEMIA: Primary | ICD-10-CM

## 2021-02-10 PROCEDURE — 99406 BEHAV CHNG SMOKING 3-10 MIN: CPT | Mod: S$GLB,,,

## 2021-02-10 PROCEDURE — 99406 PR TOBACCO USE CESSATION INTERMEDIATE 3-10 MINUTES: ICD-10-PCS | Mod: S$GLB,,,

## 2021-02-16 ENCOUNTER — PATIENT MESSAGE (OUTPATIENT)
Dept: DERMATOLOGY | Facility: CLINIC | Age: 66
End: 2021-02-16

## 2021-02-18 ENCOUNTER — IMMUNIZATION (OUTPATIENT)
Dept: INTERNAL MEDICINE | Facility: CLINIC | Age: 66
End: 2021-02-18
Payer: MEDICARE

## 2021-02-18 DIAGNOSIS — Z23 NEED FOR VACCINATION: Primary | ICD-10-CM

## 2021-02-18 PROCEDURE — 91300 COVID-19, MRNA, LNP-S, PF, 30 MCG/0.3 ML DOSE VACCINE: CPT | Mod: PBBFAC,PO

## 2021-02-22 ENCOUNTER — TELEPHONE (OUTPATIENT)
Dept: DERMATOLOGY | Facility: CLINIC | Age: 66
End: 2021-02-22

## 2021-02-22 ENCOUNTER — PATIENT MESSAGE (OUTPATIENT)
Dept: DERMATOLOGY | Facility: CLINIC | Age: 66
End: 2021-02-22

## 2021-02-23 ENCOUNTER — TELEPHONE (OUTPATIENT)
Dept: INTERNAL MEDICINE | Facility: CLINIC | Age: 66
End: 2021-02-23

## 2021-02-23 RX ORDER — AMLODIPINE BESYLATE 10 MG/1
10 TABLET ORAL DAILY
Qty: 30 TABLET | Refills: 11 | Status: SHIPPED | OUTPATIENT
Start: 2021-02-23 | End: 2022-03-03 | Stop reason: SDUPTHER

## 2021-03-05 ENCOUNTER — LAB VISIT (OUTPATIENT)
Dept: LAB | Facility: HOSPITAL | Age: 66
End: 2021-03-05
Attending: INTERNAL MEDICINE
Payer: MEDICARE

## 2021-03-05 DIAGNOSIS — Z12.5 PROSTATE CANCER SCREENING: ICD-10-CM

## 2021-03-05 DIAGNOSIS — I10 ESSENTIAL HYPERTENSION: ICD-10-CM

## 2021-03-05 DIAGNOSIS — E78.49 OTHER HYPERLIPIDEMIA: ICD-10-CM

## 2021-03-05 DIAGNOSIS — R73.01 IMPAIRED FASTING BLOOD SUGAR: ICD-10-CM

## 2021-03-05 LAB
ALBUMIN SERPL BCP-MCNC: 4.2 G/DL (ref 3.5–5.2)
ALP SERPL-CCNC: 57 U/L (ref 55–135)
ALT SERPL W/O P-5'-P-CCNC: 20 U/L (ref 10–44)
ANION GAP SERPL CALC-SCNC: 9 MMOL/L (ref 8–16)
AST SERPL-CCNC: 16 U/L (ref 10–40)
BASOPHILS # BLD AUTO: 0.04 K/UL (ref 0–0.2)
BASOPHILS NFR BLD: 0.5 % (ref 0–1.9)
BILIRUB SERPL-MCNC: 0.7 MG/DL (ref 0.1–1)
BUN SERPL-MCNC: 27 MG/DL (ref 8–23)
CALCIUM SERPL-MCNC: 9.7 MG/DL (ref 8.7–10.5)
CHLORIDE SERPL-SCNC: 105 MMOL/L (ref 95–110)
CHOLEST SERPL-MCNC: 137 MG/DL (ref 120–199)
CHOLEST/HDLC SERPL: 3.7 {RATIO} (ref 2–5)
CO2 SERPL-SCNC: 25 MMOL/L (ref 23–29)
COMPLEXED PSA SERPL-MCNC: 2.8 NG/ML (ref 0–4)
CREAT SERPL-MCNC: 1.2 MG/DL (ref 0.5–1.4)
DIFFERENTIAL METHOD: NORMAL
EOSINOPHIL # BLD AUTO: 0.4 K/UL (ref 0–0.5)
EOSINOPHIL NFR BLD: 5.6 % (ref 0–8)
ERYTHROCYTE [DISTWIDTH] IN BLOOD BY AUTOMATED COUNT: 13.2 % (ref 11.5–14.5)
EST. GFR  (AFRICAN AMERICAN): >60 ML/MIN/1.73 M^2
EST. GFR  (NON AFRICAN AMERICAN): >60 ML/MIN/1.73 M^2
ESTIMATED AVG GLUCOSE: 108 MG/DL (ref 68–131)
GLUCOSE SERPL-MCNC: 92 MG/DL (ref 70–110)
HBA1C MFR BLD: 5.4 % (ref 4–5.6)
HCT VFR BLD AUTO: 50.1 % (ref 40–54)
HDLC SERPL-MCNC: 37 MG/DL (ref 40–75)
HDLC SERPL: 27 % (ref 20–50)
HGB BLD-MCNC: 16.1 G/DL (ref 14–18)
IMM GRANULOCYTES # BLD AUTO: 0.02 K/UL (ref 0–0.04)
IMM GRANULOCYTES NFR BLD AUTO: 0.3 % (ref 0–0.5)
LDLC SERPL CALC-MCNC: 81.8 MG/DL (ref 63–159)
LYMPHOCYTES # BLD AUTO: 2.2 K/UL (ref 1–4.8)
LYMPHOCYTES NFR BLD: 30.4 % (ref 18–48)
MCH RBC QN AUTO: 27.8 PG (ref 27–31)
MCHC RBC AUTO-ENTMCNC: 32.1 G/DL (ref 32–36)
MCV RBC AUTO: 86 FL (ref 82–98)
MONOCYTES # BLD AUTO: 0.8 K/UL (ref 0.3–1)
MONOCYTES NFR BLD: 10.6 % (ref 4–15)
NEUTROPHILS # BLD AUTO: 3.9 K/UL (ref 1.8–7.7)
NEUTROPHILS NFR BLD: 52.6 % (ref 38–73)
NONHDLC SERPL-MCNC: 100 MG/DL
NRBC BLD-RTO: 0 /100 WBC
PLATELET # BLD AUTO: 225 K/UL (ref 150–350)
PMV BLD AUTO: 9.3 FL (ref 9.2–12.9)
POTASSIUM SERPL-SCNC: 4.6 MMOL/L (ref 3.5–5.1)
PROT SERPL-MCNC: 7.4 G/DL (ref 6–8.4)
RBC # BLD AUTO: 5.8 M/UL (ref 4.6–6.2)
SODIUM SERPL-SCNC: 139 MMOL/L (ref 136–145)
TRIGL SERPL-MCNC: 91 MG/DL (ref 30–150)
TSH SERPL DL<=0.005 MIU/L-ACNC: 1.29 UIU/ML (ref 0.4–4)
WBC # BLD AUTO: 7.38 K/UL (ref 3.9–12.7)

## 2021-03-05 PROCEDURE — 83036 HEMOGLOBIN GLYCOSYLATED A1C: CPT | Performed by: INTERNAL MEDICINE

## 2021-03-05 PROCEDURE — 84443 ASSAY THYROID STIM HORMONE: CPT | Performed by: INTERNAL MEDICINE

## 2021-03-05 PROCEDURE — 85025 COMPLETE CBC W/AUTO DIFF WBC: CPT | Performed by: INTERNAL MEDICINE

## 2021-03-05 PROCEDURE — 80061 LIPID PANEL: CPT | Performed by: INTERNAL MEDICINE

## 2021-03-05 PROCEDURE — 36415 COLL VENOUS BLD VENIPUNCTURE: CPT | Mod: PO | Performed by: INTERNAL MEDICINE

## 2021-03-05 PROCEDURE — 80053 COMPREHEN METABOLIC PANEL: CPT | Performed by: INTERNAL MEDICINE

## 2021-03-05 PROCEDURE — 84153 ASSAY OF PSA TOTAL: CPT | Performed by: INTERNAL MEDICINE

## 2021-03-10 ENCOUNTER — OFFICE VISIT (OUTPATIENT)
Dept: INTERNAL MEDICINE | Facility: CLINIC | Age: 66
End: 2021-03-10
Payer: MEDICARE

## 2021-03-10 ENCOUNTER — TELEPHONE (OUTPATIENT)
Dept: INTERNAL MEDICINE | Facility: CLINIC | Age: 66
End: 2021-03-10

## 2021-03-10 VITALS
OXYGEN SATURATION: 97 % | SYSTOLIC BLOOD PRESSURE: 138 MMHG | BODY MASS INDEX: 30.79 KG/M2 | DIASTOLIC BLOOD PRESSURE: 88 MMHG | HEIGHT: 69 IN | RESPIRATION RATE: 16 BRPM | HEART RATE: 92 BPM | WEIGHT: 207.88 LBS

## 2021-03-10 DIAGNOSIS — I10 ESSENTIAL HYPERTENSION: Primary | Chronic | ICD-10-CM

## 2021-03-10 DIAGNOSIS — Z00.00 ANNUAL PHYSICAL EXAM: ICD-10-CM

## 2021-03-10 DIAGNOSIS — F32.A DEPRESSION, UNSPECIFIED DEPRESSION TYPE: Chronic | ICD-10-CM

## 2021-03-10 DIAGNOSIS — E78.49 OTHER HYPERLIPIDEMIA: Chronic | ICD-10-CM

## 2021-03-10 PROCEDURE — 99999 PR PBB SHADOW E&M-EST. PATIENT-LVL III: ICD-10-PCS | Mod: PBBFAC,,, | Performed by: INTERNAL MEDICINE

## 2021-03-10 PROCEDURE — 99214 OFFICE O/P EST MOD 30 MIN: CPT | Mod: S$PBB,,, | Performed by: INTERNAL MEDICINE

## 2021-03-10 PROCEDURE — 99213 OFFICE O/P EST LOW 20 MIN: CPT | Mod: PBBFAC,PO | Performed by: INTERNAL MEDICINE

## 2021-03-10 PROCEDURE — 99999 PR PBB SHADOW E&M-EST. PATIENT-LVL III: CPT | Mod: PBBFAC,,, | Performed by: INTERNAL MEDICINE

## 2021-03-10 PROCEDURE — 99214 PR OFFICE/OUTPT VISIT, EST, LEVL IV, 30-39 MIN: ICD-10-PCS | Mod: S$PBB,,, | Performed by: INTERNAL MEDICINE

## 2021-03-10 RX ORDER — VARICELLA-ZOSTER GE VAC,2 OF 2 50 MCG
1 VIAL (EA) INTRAMUSCULAR ONCE
Qty: 1 EACH | Refills: 1 | Status: SHIPPED | OUTPATIENT
Start: 2021-03-10 | End: 2021-03-10

## 2021-03-11 ENCOUNTER — IMMUNIZATION (OUTPATIENT)
Dept: INTERNAL MEDICINE | Facility: CLINIC | Age: 66
End: 2021-03-11
Payer: MEDICARE

## 2021-03-11 DIAGNOSIS — Z23 NEED FOR VACCINATION: Primary | ICD-10-CM

## 2021-03-11 PROCEDURE — 91300 COVID-19, MRNA, LNP-S, PF, 30 MCG/0.3 ML DOSE VACCINE: CPT | Mod: PBBFAC | Performed by: FAMILY MEDICINE

## 2021-03-11 PROCEDURE — 0002A COVID-19, MRNA, LNP-S, PF, 30 MCG/0.3 ML DOSE VACCINE: CPT | Mod: PBBFAC | Performed by: FAMILY MEDICINE

## 2021-06-02 ENCOUNTER — PATIENT MESSAGE (OUTPATIENT)
Dept: INTERNAL MEDICINE | Facility: CLINIC | Age: 66
End: 2021-06-02

## 2021-06-02 RX ORDER — ATORVASTATIN CALCIUM 20 MG/1
20 TABLET, FILM COATED ORAL DAILY
Qty: 90 TABLET | Refills: 2 | Status: SHIPPED | OUTPATIENT
Start: 2021-06-02 | End: 2022-03-03 | Stop reason: SDUPTHER

## 2021-06-02 RX ORDER — CITALOPRAM 40 MG/1
40 TABLET, FILM COATED ORAL DAILY
Qty: 90 TABLET | Refills: 2 | Status: SHIPPED | OUTPATIENT
Start: 2021-06-02 | End: 2022-03-03 | Stop reason: SDUPTHER

## 2021-06-24 ENCOUNTER — CLINICAL SUPPORT (OUTPATIENT)
Dept: SMOKING CESSATION | Facility: CLINIC | Age: 66
End: 2021-06-24
Payer: COMMERCIAL

## 2021-06-24 DIAGNOSIS — F17.210 CIGARETTE NICOTINE DEPENDENCE, UNCOMPLICATED: Primary | ICD-10-CM

## 2021-06-24 PROCEDURE — 99407 PR TOBACCO USE CESSATION INTENSIVE >10 MINUTES: ICD-10-PCS | Mod: S$GLB,,,

## 2021-06-24 PROCEDURE — 99407 BEHAV CHNG SMOKING > 10 MIN: CPT | Mod: S$GLB,,,

## 2021-06-29 ENCOUNTER — CLINICAL SUPPORT (OUTPATIENT)
Dept: SMOKING CESSATION | Facility: CLINIC | Age: 66
End: 2021-06-29
Payer: COMMERCIAL

## 2021-06-29 DIAGNOSIS — F17.210 LIGHT CIGARETTE SMOKER (1-9 CIGARETTES PER DAY): ICD-10-CM

## 2021-06-29 PROCEDURE — 99999 PR PBB SHADOW E&M-EST. PATIENT-LVL I: CPT | Mod: PBBFAC,,,

## 2021-06-29 PROCEDURE — 99404 PREV MED CNSL INDIV APPRX 60: CPT | Mod: S$GLB,,,

## 2021-06-29 PROCEDURE — 99999 PR PBB SHADOW E&M-EST. PATIENT-LVL I: ICD-10-PCS | Mod: PBBFAC,,,

## 2021-06-29 PROCEDURE — 99404 PR PREVENT COUNSEL,INDIV,60 MIN: ICD-10-PCS | Mod: S$GLB,,,

## 2021-06-29 RX ORDER — VARENICLINE TARTRATE 1 MG/1
1 TABLET, FILM COATED ORAL 2 TIMES DAILY
Qty: 56 TABLET | Refills: 0 | Status: SHIPPED | OUTPATIENT
Start: 2021-06-29 | End: 2021-07-28

## 2021-07-14 ENCOUNTER — CLINICAL SUPPORT (OUTPATIENT)
Dept: SMOKING CESSATION | Facility: CLINIC | Age: 66
End: 2021-07-14
Payer: COMMERCIAL

## 2021-07-14 DIAGNOSIS — F17.200 NICOTINE DEPENDENCE: Primary | ICD-10-CM

## 2021-07-14 PROCEDURE — 99403 PR PREVENT COUNSEL,INDIV,45 MIN: ICD-10-PCS | Mod: S$GLB,,,

## 2021-07-14 PROCEDURE — 99403 PREV MED CNSL INDIV APPRX 45: CPT | Mod: S$GLB,,,

## 2021-07-28 ENCOUNTER — CLINICAL SUPPORT (OUTPATIENT)
Dept: SMOKING CESSATION | Facility: CLINIC | Age: 66
End: 2021-07-28
Payer: COMMERCIAL

## 2021-07-28 DIAGNOSIS — F17.200 NICOTINE DEPENDENCE: Primary | ICD-10-CM

## 2021-07-28 PROCEDURE — 99402 PREV MED CNSL INDIV APPRX 30: CPT | Mod: S$GLB,,,

## 2021-07-28 PROCEDURE — 99402 PR PREVENT COUNSEL,INDIV,30 MIN: ICD-10-PCS | Mod: S$GLB,,,

## 2021-08-25 ENCOUNTER — CLINICAL SUPPORT (OUTPATIENT)
Dept: SMOKING CESSATION | Facility: CLINIC | Age: 66
End: 2021-08-25
Payer: COMMERCIAL

## 2021-08-25 DIAGNOSIS — F17.200 NICOTINE DEPENDENCE: Primary | ICD-10-CM

## 2021-08-25 PROCEDURE — 99401 PR PREVENT COUNSEL,INDIV,15 MIN: ICD-10-PCS | Mod: S$GLB,,,

## 2021-08-25 PROCEDURE — 99401 PREV MED CNSL INDIV APPRX 15: CPT | Mod: S$GLB,,,

## 2021-08-25 PROCEDURE — 99999 PR PBB SHADOW E&M-EST. PATIENT-LVL I: ICD-10-PCS | Mod: PBBFAC,,,

## 2021-08-25 PROCEDURE — 99999 PR PBB SHADOW E&M-EST. PATIENT-LVL I: CPT | Mod: PBBFAC,,,

## 2021-09-22 ENCOUNTER — CLINICAL SUPPORT (OUTPATIENT)
Dept: SMOKING CESSATION | Facility: CLINIC | Age: 66
End: 2021-09-22
Payer: COMMERCIAL

## 2021-09-22 DIAGNOSIS — F17.200 NICOTINE DEPENDENCE: Primary | ICD-10-CM

## 2021-09-22 PROCEDURE — 99999 PR PBB SHADOW E&M-EST. PATIENT-LVL I: ICD-10-PCS | Mod: PBBFAC,,,

## 2021-09-22 PROCEDURE — 99401 PR PREVENT COUNSEL,INDIV,15 MIN: ICD-10-PCS | Mod: S$GLB,,,

## 2021-09-22 PROCEDURE — 99401 PREV MED CNSL INDIV APPRX 15: CPT | Mod: S$GLB,,,

## 2021-09-22 PROCEDURE — 99999 PR PBB SHADOW E&M-EST. PATIENT-LVL I: CPT | Mod: PBBFAC,,,

## 2021-09-30 ENCOUNTER — IMMUNIZATION (OUTPATIENT)
Dept: INTERNAL MEDICINE | Facility: CLINIC | Age: 66
End: 2021-09-30
Payer: MEDICARE

## 2021-09-30 DIAGNOSIS — Z23 NEED FOR VACCINATION: Primary | ICD-10-CM

## 2021-09-30 PROCEDURE — 91300 COVID-19, MRNA, LNP-S, PF, 30 MCG/0.3 ML DOSE VACCINE: CPT | Mod: PBBFAC,PO

## 2021-09-30 PROCEDURE — 0003A COVID-19, MRNA, LNP-S, PF, 30 MCG/0.3 ML DOSE VACCINE: CPT | Mod: PBBFAC,PO

## 2021-10-18 ENCOUNTER — CLINICAL SUPPORT (OUTPATIENT)
Dept: SMOKING CESSATION | Facility: CLINIC | Age: 66
End: 2021-10-18
Payer: COMMERCIAL

## 2021-10-18 DIAGNOSIS — F17.200 NICOTINE DEPENDENCE: Primary | ICD-10-CM

## 2021-10-18 PROCEDURE — 99407 BEHAV CHNG SMOKING > 10 MIN: CPT | Mod: S$GLB,,,

## 2021-10-18 PROCEDURE — 99407 PR TOBACCO USE CESSATION INTENSIVE >10 MINUTES: ICD-10-PCS | Mod: S$GLB,,,

## 2021-12-15 ENCOUNTER — CLINICAL SUPPORT (OUTPATIENT)
Dept: SMOKING CESSATION | Facility: CLINIC | Age: 66
End: 2021-12-15
Payer: COMMERCIAL

## 2021-12-15 DIAGNOSIS — F17.200 NICOTINE DEPENDENCE: Primary | ICD-10-CM

## 2021-12-15 PROCEDURE — 99407 PR TOBACCO USE CESSATION INTENSIVE >10 MINUTES: ICD-10-PCS | Mod: S$GLB,,,

## 2021-12-15 PROCEDURE — 99407 BEHAV CHNG SMOKING > 10 MIN: CPT | Mod: S$GLB,,,

## 2022-02-08 ENCOUNTER — PATIENT MESSAGE (OUTPATIENT)
Dept: INTERNAL MEDICINE | Facility: CLINIC | Age: 67
End: 2022-02-08
Payer: MEDICARE

## 2022-02-08 DIAGNOSIS — I10 ESSENTIAL HYPERTENSION: Primary | Chronic | ICD-10-CM

## 2022-02-08 DIAGNOSIS — E78.49 OTHER HYPERLIPIDEMIA: Chronic | ICD-10-CM

## 2022-02-21 ENCOUNTER — PATIENT OUTREACH (OUTPATIENT)
Dept: ADMINISTRATIVE | Facility: HOSPITAL | Age: 67
End: 2022-02-21
Payer: MEDICARE

## 2022-02-21 NOTE — PROGRESS NOTES
[facility] chart audits-DEPRESSION SCREEN/FOLLOW UP PLAN.    SCREENING:  NO PHQ9 SCREENING FOR MEASUREMENT PERIOD.

## 2022-03-03 ENCOUNTER — OFFICE VISIT (OUTPATIENT)
Dept: INTERNAL MEDICINE | Facility: CLINIC | Age: 67
End: 2022-03-03
Payer: MEDICARE

## 2022-03-03 VITALS
BODY MASS INDEX: 31.9 KG/M2 | HEIGHT: 69 IN | SYSTOLIC BLOOD PRESSURE: 138 MMHG | RESPIRATION RATE: 14 BRPM | HEART RATE: 93 BPM | TEMPERATURE: 98 F | DIASTOLIC BLOOD PRESSURE: 78 MMHG | WEIGHT: 215.38 LBS

## 2022-03-03 DIAGNOSIS — Z00.00 ANNUAL PHYSICAL EXAM: ICD-10-CM

## 2022-03-03 DIAGNOSIS — M25.512 ACUTE PAIN OF LEFT SHOULDER: ICD-10-CM

## 2022-03-03 DIAGNOSIS — R10.9 ABDOMINAL PAIN, UNSPECIFIED ABDOMINAL LOCATION: ICD-10-CM

## 2022-03-03 DIAGNOSIS — R05.3 CHRONIC COUGH: ICD-10-CM

## 2022-03-03 DIAGNOSIS — Z23 NEED FOR SHINGLES VACCINE: ICD-10-CM

## 2022-03-03 DIAGNOSIS — E78.49 OTHER HYPERLIPIDEMIA: Chronic | ICD-10-CM

## 2022-03-03 DIAGNOSIS — Z12.5 PROSTATE CANCER SCREENING: ICD-10-CM

## 2022-03-03 DIAGNOSIS — F32.5 MAJOR DEPRESSIVE DISORDER WITH SINGLE EPISODE, IN FULL REMISSION: Chronic | ICD-10-CM

## 2022-03-03 DIAGNOSIS — Z87.891 HISTORY OF TOBACCO ABUSE: ICD-10-CM

## 2022-03-03 DIAGNOSIS — I10 ESSENTIAL HYPERTENSION: Primary | Chronic | ICD-10-CM

## 2022-03-03 PROCEDURE — G0008 ADMIN INFLUENZA VIRUS VAC: HCPCS | Mod: PBBFAC,PO

## 2022-03-03 PROCEDURE — 99214 OFFICE O/P EST MOD 30 MIN: CPT | Mod: PBBFAC,PO | Performed by: INTERNAL MEDICINE

## 2022-03-03 PROCEDURE — 99214 PR OFFICE/OUTPT VISIT, EST, LEVL IV, 30-39 MIN: ICD-10-PCS | Mod: S$PBB,,, | Performed by: INTERNAL MEDICINE

## 2022-03-03 PROCEDURE — G0009 ADMIN PNEUMOCOCCAL VACCINE: HCPCS | Mod: PBBFAC,PO

## 2022-03-03 PROCEDURE — 99214 OFFICE O/P EST MOD 30 MIN: CPT | Mod: S$PBB,,, | Performed by: INTERNAL MEDICINE

## 2022-03-03 PROCEDURE — 99999 PR PBB SHADOW E&M-EST. PATIENT-LVL IV: ICD-10-PCS | Mod: PBBFAC,,, | Performed by: INTERNAL MEDICINE

## 2022-03-03 PROCEDURE — 99999 PR PBB SHADOW E&M-EST. PATIENT-LVL IV: CPT | Mod: PBBFAC,,, | Performed by: INTERNAL MEDICINE

## 2022-03-03 RX ORDER — TIZANIDINE 4 MG/1
4 TABLET ORAL EVERY 8 HOURS PRN
Qty: 50 TABLET | Refills: 0 | Status: SHIPPED | OUTPATIENT
Start: 2022-03-03 | End: 2022-03-13

## 2022-03-03 RX ORDER — AMLODIPINE BESYLATE 10 MG/1
10 TABLET ORAL DAILY
Qty: 90 TABLET | Refills: 3 | Status: SHIPPED | OUTPATIENT
Start: 2022-03-03 | End: 2022-03-04

## 2022-03-03 RX ORDER — CITALOPRAM 40 MG/1
40 TABLET, FILM COATED ORAL DAILY
Qty: 90 TABLET | Refills: 3 | Status: SHIPPED | OUTPATIENT
Start: 2022-03-03 | End: 2022-07-06 | Stop reason: SDUPTHER

## 2022-03-03 RX ORDER — ATORVASTATIN CALCIUM 20 MG/1
20 TABLET, FILM COATED ORAL DAILY
Qty: 90 TABLET | Refills: 3 | Status: SHIPPED | OUTPATIENT
Start: 2022-03-03 | End: 2022-07-06 | Stop reason: SDUPTHER

## 2022-03-03 NOTE — PROGRESS NOTES
Subjective:       Patient ID: Anthony Hanna is a 66 y.o. male.    Chief Complaint: Follow-up    HPI     66 y.o. male here for follow-up of chronic medical conditions.    HTN - Patient is currently on amlodipine 10 mg. He does not check his BP at home. Side effects of medications note: none. Denies headaches, blurred vision, chest pain, shortness of breath, nausea.    HLD - Patient is currently on Lipitor 20 mg.  His last lipid panel was   Cholesterol   Date Value Ref Range Status   03/05/2021 137 120 - 199 mg/dL Final     Comment:     The National Cholesterol Education Program (NCEP) has set the  following guidelines (reference ranges) for Cholesterol:  Optimal.....................<200 mg/dL  Borderline High.............200-239 mg/dL  High........................> or = 240 mg/dL       Triglycerides   Date Value Ref Range Status   03/05/2021 91 30 - 150 mg/dL Final     Comment:     The National Cholesterol Education Program (NCEP) has set the  following guidelines (reference values) for triglycerides:  Normal......................<150 mg/dL  Borderline High.............150-199 mg/dL  High........................200-499 mg/dL       HDL   Date Value Ref Range Status   03/05/2021 37 (L) 40 - 75 mg/dL Final     Comment:     The National Cholesterol Education Program (NCEP) has set the  following guidelines (reference values) for HDL Cholesterol:  Low...............<40 mg/dL  Optimal...........>60 mg/dL       LDL Cholesterol   Date Value Ref Range Status   03/05/2021 81.8 63.0 - 159.0 mg/dL Final     Comment:     The National Cholesterol Education Program (NCEP) has set the  following guidelines (reference values) for LDL Cholesterol:  Optimal.......................<130 mg/dL  Borderline High...............130-159 mg/dL  High..........................160-189 mg/dL  Very High.....................>190 mg/dL     .  Side effects of the medication: none.    Patient has depression and is on Celexa 40 mg.    Cholesterol:  needs  Vaccines: Influenza - needs; Tetanus - 2017; Pneumovax - needs; Prevnar - needs; Zoster - needs; COVID - 3 done  Sexual Screening:   STD screening: no concern  Eye exam: done last 1-2 years ago  Prostate: needs  Colonoscopy: 2018, due     Exercise: no regular exercise.  Diet: home cooked, but eats a lot    He would like to lose weight.  He cannot get motivated to lose weight.    He still has a cough from when he quit smoking.  When he was smoking he coughed.  He expected the cough to resolve.      He has a muscle in her left shoulder and back of his neck that is killing him.  It started Friday and got worse .  It was so painful .  It has not gotten any better.  He has not done anything unusual.  He woke up with this.  He has tried aleve and a hot shower.    Past Medical History:   Diagnosis Date    Ankle fracture     Cataract     Essential hypertension     HLD (hyperlipidemia)     Nuclear sclerosis - Both Eyes 2013    patient states he is not familiar with this diagnosis      Past Surgical History:   Procedure Laterality Date    BACK SURGERY      COLONOSCOPY N/A 2018    Procedure: COLONOSCOPY;  Surgeon: PUMA Fernandez MD;  Location: [facility] ([address] [place] [address]);  Service: Endoscopy;  Laterality: N/A;    FRACTURE SURGERY      right ankle       Social History     Socioeconomic History    Marital status:    Tobacco Use    Smoking status: Former Smoker     Packs/day: 0.25     Years: 15.00     Pack years: 3.75     Types: Cigarettes     Quit date: 10/31/2020     Years since quittin.3    Smokeless tobacco: Never Used    Tobacco comment: In Smoking cessation program   Substance and Sexual Activity    Alcohol use: Yes     Alcohol/week: 0.0 standard drinks     Comment: couple beers a week or less    Drug use: No    Sexual activity: Yes     Partners: Female     Comment:      Social Determinants of Health     Financial Resource Strain: Low Risk     Difficulty  of Paying Living Expenses: Not hard at all   Food Insecurity: No Food Insecurity    Worried About Running Out of Food in the Last Year: Never true    Ran Out of Food in the Last Year: Never true   Transportation Needs: No Transportation Needs    Lack of Transportation (Medical): No    Lack of Transportation (Non-Medical): No   Physical Activity: Insufficiently Active    Days of Exercise per Week: 1 day    Minutes of Exercise per Session: 90 min   Stress: Stress Concern Present    Feeling of Stress : To some extent   Social Connections: Unknown    Frequency of Communication with Friends and Family: More than three times a week    Frequency of Social Gatherings with Friends and Family: More than three times a week    Active Member of Clubs or Organizations: No    Attends Club or Organization Meetings: Patient refused    Marital Status:      Review of patient's allergies indicates:   Allergen Reactions    Pcn [penicillins] Anaphylaxis    Candesartan      Itching - drug rash     Anthony St Merchant had no medications administered during this visit.    Review of Systems      Objective:      Physical Exam  Vitals reviewed.   Constitutional:       Appearance: He is well-developed.   HENT:      Head: Normocephalic and atraumatic.      Mouth/Throat:      Pharynx: No oropharyngeal exudate.   Eyes:      General: No scleral icterus.        Right eye: No discharge.         Left eye: No discharge.      Pupils: Pupils are equal, round, and reactive to light.   Neck:      Thyroid: No thyromegaly.      Trachea: No tracheal deviation.   Cardiovascular:      Rate and Rhythm: Normal rate and regular rhythm.      Heart sounds: Normal heart sounds. No murmur heard.    No friction rub. No gallop.   Pulmonary:      Effort: Pulmonary effort is normal. No respiratory distress.      Breath sounds: Normal breath sounds. No wheezing or rales.   Chest:      Chest wall: No tenderness.   Abdominal:      General: Bowel sounds are  normal. There is no distension.      Palpations: Abdomen is soft. There is no mass.      Tenderness: There is no abdominal tenderness. There is no guarding or rebound.   Musculoskeletal:         General: No tenderness. Normal range of motion.      Cervical back: Normal range of motion and neck supple.   Skin:     General: Skin is warm and dry.      Coloration: Skin is not pale.      Findings: No erythema or rash.   Neurological:      Mental Status: He is alert and oriented to person, place, and time.   Psychiatric:         Behavior: Behavior normal.         Assessment:       1. Essential hypertension  - CBC Auto Differential; Future  - Comprehensive Metabolic Panel; Future  - TSH; Future  - Lipid Panel; Future    2. Other hyperlipidemia    3. Major depressive disorder with single episode, in full remission    4. Prostate cancer screening    5. Chronic cough  - CT Chest Lung Screening Low Dose; Future    6. History of tobacco abuse  - CT Chest Lung Screening Low Dose; Future    7. Acute pain of left shoulder    8. Abdominal pain, unspecified abdominal location  - Ambulatory referral/consult to Gastroenterology; Future    9. Annual physical exam      Plan:       1.  Continue amlodipine 10 mg.  2. Continue Lipitor 20 mg.  3. Continue Celexa 40 mg.  4. Check PSA.  5/6. Check CT scan chest.    7. Zanaflex as needed.  Continue ibuprofen or naproxen.  8. GI referral entered.  Patient offered, because he brought up continued abdominal pain since 2019. Declined GI referral this time stating that he is not sure that this is anything to be concerned about.  9. Check CBC, CMP, TSH, lipids, PSA.  Discussed diet and exercise.  Up-to-date on some vaccines.  Flu vaccine and Pneumovax given today.  10. Refer to Infectious Disease for shingles vaccine.

## 2022-03-04 ENCOUNTER — OFFICE VISIT (OUTPATIENT)
Dept: URGENT CARE | Facility: CLINIC | Age: 67
End: 2022-03-04
Payer: MEDICARE

## 2022-03-04 VITALS
BODY MASS INDEX: 31.84 KG/M2 | TEMPERATURE: 99 F | HEART RATE: 98 BPM | RESPIRATION RATE: 17 BRPM | HEIGHT: 69 IN | WEIGHT: 215 LBS | DIASTOLIC BLOOD PRESSURE: 88 MMHG | OXYGEN SATURATION: 92 % | SYSTOLIC BLOOD PRESSURE: 147 MMHG

## 2022-03-04 DIAGNOSIS — R05.9 COUGH: ICD-10-CM

## 2022-03-04 DIAGNOSIS — R09.02 HYPOXIA: ICD-10-CM

## 2022-03-04 DIAGNOSIS — R50.9 FEVER, UNSPECIFIED FEVER CAUSE: Primary | ICD-10-CM

## 2022-03-04 LAB
CTP QC/QA: YES
CTP QC/QA: YES
POC MOLECULAR INFLUENZA A AGN: NEGATIVE
POC MOLECULAR INFLUENZA B AGN: NEGATIVE
SARS-COV-2 RDRP RESP QL NAA+PROBE: NEGATIVE

## 2022-03-04 PROCEDURE — 71046 XR CHEST PA AND LATERAL: ICD-10-PCS | Mod: FY,S$GLB,, | Performed by: RADIOLOGY

## 2022-03-04 PROCEDURE — 99215 OFFICE O/P EST HI 40 MIN: CPT | Mod: 25,S$GLB,, | Performed by: FAMILY MEDICINE

## 2022-03-04 PROCEDURE — 99215 PR OFFICE/OUTPT VISIT, EST, LEVL V, 40-54 MIN: ICD-10-PCS | Mod: 25,S$GLB,, | Performed by: FAMILY MEDICINE

## 2022-03-04 PROCEDURE — U0002 COVID-19 LAB TEST NON-CDC: HCPCS | Mod: QW,CR,S$GLB, | Performed by: FAMILY MEDICINE

## 2022-03-04 PROCEDURE — 87502 INFLUENZA DNA AMP PROBE: CPT | Mod: QW,S$GLB,, | Performed by: FAMILY MEDICINE

## 2022-03-04 PROCEDURE — 94640 PR INHAL RX, AIRWAY OBST/DX SPUTUM INDUCT: ICD-10-PCS | Mod: S$GLB,,, | Performed by: FAMILY MEDICINE

## 2022-03-04 PROCEDURE — U0002: ICD-10-PCS | Mod: QW,CR,S$GLB, | Performed by: FAMILY MEDICINE

## 2022-03-04 PROCEDURE — 87502 POCT INFLUENZA A/B MOLECULAR: ICD-10-PCS | Mod: QW,S$GLB,, | Performed by: FAMILY MEDICINE

## 2022-03-04 PROCEDURE — 71046 X-RAY EXAM CHEST 2 VIEWS: CPT | Mod: FY,S$GLB,, | Performed by: RADIOLOGY

## 2022-03-04 PROCEDURE — 94640 AIRWAY INHALATION TREATMENT: CPT | Mod: S$GLB,,, | Performed by: FAMILY MEDICINE

## 2022-03-04 RX ORDER — ALBUTEROL SULFATE 0.83 MG/ML
2.5 SOLUTION RESPIRATORY (INHALATION)
Status: COMPLETED | OUTPATIENT
Start: 2022-03-04 | End: 2022-03-04

## 2022-03-04 RX ADMIN — ALBUTEROL SULFATE 2.5 MG: 0.83 SOLUTION RESPIRATORY (INHALATION) at 07:03

## 2022-03-05 ENCOUNTER — HOSPITAL ENCOUNTER (EMERGENCY)
Facility: HOSPITAL | Age: 67
Discharge: HOME | End: 2022-03-05
Attending: EMERGENCY MEDICINE
Payer: MEDICARE

## 2022-03-05 ENCOUNTER — PATIENT MESSAGE (OUTPATIENT)
Dept: INTERNAL MEDICINE | Facility: CLINIC | Age: 67
End: 2022-03-05
Payer: MEDICARE

## 2022-03-05 VITALS
HEART RATE: 84 BPM | WEIGHT: 210 LBS | HEIGHT: 69 IN | BODY MASS INDEX: 31.1 KG/M2 | TEMPERATURE: 99 F | OXYGEN SATURATION: 97 % | DIASTOLIC BLOOD PRESSURE: 97 MMHG | RESPIRATION RATE: 20 BRPM | SYSTOLIC BLOOD PRESSURE: 171 MMHG

## 2022-03-05 DIAGNOSIS — B34.9 VIRAL SYNDROME: Primary | ICD-10-CM

## 2022-03-05 LAB
ALBUMIN SERPL BCP-MCNC: 3.7 G/DL (ref 3.5–5.2)
ALP SERPL-CCNC: 60 U/L (ref 55–135)
ALT SERPL W/O P-5'-P-CCNC: 19 U/L (ref 10–44)
ANION GAP SERPL CALC-SCNC: 15 MMOL/L (ref 8–16)
AST SERPL-CCNC: 19 U/L (ref 10–40)
BACTERIA #/AREA URNS AUTO: ABNORMAL /HPF
BASOPHILS # BLD AUTO: 0.05 K/UL (ref 0–0.2)
BASOPHILS NFR BLD: 0.4 % (ref 0–1.9)
BILIRUB SERPL-MCNC: 0.9 MG/DL (ref 0.1–1)
BILIRUB UR QL STRIP: NEGATIVE
BUN SERPL-MCNC: 22 MG/DL (ref 8–23)
BUN SERPL-MCNC: 28 MG/DL (ref 6–30)
CALCIUM SERPL-MCNC: 9.6 MG/DL (ref 8.7–10.5)
CHLORIDE SERPL-SCNC: 100 MMOL/L (ref 95–110)
CHLORIDE SERPL-SCNC: 101 MMOL/L (ref 95–110)
CLARITY UR REFRACT.AUTO: CLEAR
CO2 SERPL-SCNC: 21 MMOL/L (ref 23–29)
COLOR UR AUTO: YELLOW
CREAT SERPL-MCNC: 1.2 MG/DL (ref 0.5–1.4)
CREAT SERPL-MCNC: 1.3 MG/DL (ref 0.5–1.4)
CTP QC/QA: YES
DIFFERENTIAL METHOD: ABNORMAL
EOSINOPHIL # BLD AUTO: 0 K/UL (ref 0–0.5)
EOSINOPHIL NFR BLD: 0.1 % (ref 0–8)
ERYTHROCYTE [DISTWIDTH] IN BLOOD BY AUTOMATED COUNT: 13.5 % (ref 11.5–14.5)
EST. GFR  (AFRICAN AMERICAN): >60 ML/MIN/1.73 M^2
EST. GFR  (NON AFRICAN AMERICAN): >60 ML/MIN/1.73 M^2
GLUCOSE SERPL-MCNC: 100 MG/DL (ref 70–110)
GLUCOSE SERPL-MCNC: 92 MG/DL (ref 70–110)
GLUCOSE UR QL STRIP: NEGATIVE
HCT VFR BLD AUTO: 47.8 % (ref 40–54)
HCT VFR BLD CALC: 48 %PCV (ref 36–54)
HGB BLD-MCNC: 15.7 G/DL (ref 14–18)
HGB UR QL STRIP: ABNORMAL
HYALINE CASTS UR QL AUTO: 8 /LPF
IMM GRANULOCYTES # BLD AUTO: 0.07 K/UL (ref 0–0.04)
IMM GRANULOCYTES NFR BLD AUTO: 0.5 % (ref 0–0.5)
KETONES UR QL STRIP: ABNORMAL
LEUKOCYTE ESTERASE UR QL STRIP: NEGATIVE
LYMPHOCYTES # BLD AUTO: 1.5 K/UL (ref 1–4.8)
LYMPHOCYTES NFR BLD: 10.9 % (ref 18–48)
MCH RBC QN AUTO: 28.4 PG (ref 27–31)
MCHC RBC AUTO-ENTMCNC: 32.8 G/DL (ref 32–36)
MCV RBC AUTO: 87 FL (ref 82–98)
MICROSCOPIC COMMENT: ABNORMAL
MONOCYTES # BLD AUTO: 1.8 K/UL (ref 0.3–1)
MONOCYTES NFR BLD: 13.3 % (ref 4–15)
NEUTROPHILS # BLD AUTO: 10.2 K/UL (ref 1.8–7.7)
NEUTROPHILS NFR BLD: 74.8 % (ref 38–73)
NITRITE UR QL STRIP: NEGATIVE
NRBC BLD-RTO: 0 /100 WBC
PH UR STRIP: 5 [PH] (ref 5–8)
PLATELET # BLD AUTO: 149 K/UL (ref 150–450)
PMV BLD AUTO: 9.4 FL (ref 9.2–12.9)
POC IONIZED CALCIUM: 1.14 MMOL/L (ref 1.06–1.42)
POC TCO2 (MEASURED): 25 MMOL/L (ref 23–29)
POTASSIUM BLD-SCNC: 3.8 MMOL/L (ref 3.5–5.1)
POTASSIUM SERPL-SCNC: 4.1 MMOL/L (ref 3.5–5.1)
PROT SERPL-MCNC: 7.5 G/DL (ref 6–8.4)
PROT UR QL STRIP: ABNORMAL
RBC # BLD AUTO: 5.52 M/UL (ref 4.6–6.2)
RBC #/AREA URNS AUTO: 2 /HPF (ref 0–4)
SAMPLE: NORMAL
SARS-COV-2 RDRP RESP QL NAA+PROBE: NEGATIVE
SODIUM BLD-SCNC: 136 MMOL/L (ref 136–145)
SODIUM SERPL-SCNC: 137 MMOL/L (ref 136–145)
SP GR UR STRIP: 1.02 (ref 1–1.03)
URN SPEC COLLECT METH UR: ABNORMAL
WBC # BLD AUTO: 13.68 K/UL (ref 3.9–12.7)
WBC #/AREA URNS AUTO: 1 /HPF (ref 0–5)

## 2022-03-05 PROCEDURE — 99284 EMERGENCY DEPT VISIT MOD MDM: CPT | Mod: CS,,, | Performed by: EMERGENCY MEDICINE

## 2022-03-05 PROCEDURE — 99285 EMERGENCY DEPT VISIT HI MDM: CPT | Mod: 25

## 2022-03-05 PROCEDURE — 94664 DEMO&/EVAL PT USE INHALER: CPT

## 2022-03-05 PROCEDURE — U0002 COVID-19 LAB TEST NON-CDC: HCPCS | Performed by: EMERGENCY MEDICINE

## 2022-03-05 PROCEDURE — 85025 COMPLETE CBC W/AUTO DIFF WBC: CPT | Performed by: EMERGENCY MEDICINE

## 2022-03-05 PROCEDURE — 86803 HEPATITIS C AB TEST: CPT | Performed by: EMERGENCY MEDICINE

## 2022-03-05 PROCEDURE — 94799 UNLISTED PULMONARY SVC/PX: CPT

## 2022-03-05 PROCEDURE — 80053 COMPREHEN METABOLIC PANEL: CPT | Performed by: EMERGENCY MEDICINE

## 2022-03-05 PROCEDURE — 81001 URINALYSIS AUTO W/SCOPE: CPT | Performed by: EMERGENCY MEDICINE

## 2022-03-05 PROCEDURE — 25500020 PHARM REV CODE 255: Performed by: EMERGENCY MEDICINE

## 2022-03-05 PROCEDURE — 80047 BASIC METABLC PNL IONIZED CA: CPT

## 2022-03-05 PROCEDURE — 25000003 PHARM REV CODE 250: Performed by: EMERGENCY MEDICINE

## 2022-03-05 PROCEDURE — 99284 PR EMERGENCY DEPT VISIT,LEVEL IV: ICD-10-PCS | Mod: CS,,, | Performed by: EMERGENCY MEDICINE

## 2022-03-05 PROCEDURE — 82330 ASSAY OF CALCIUM: CPT

## 2022-03-05 PROCEDURE — 96360 HYDRATION IV INFUSION INIT: CPT | Mod: 59

## 2022-03-05 RX ADMIN — SODIUM CHLORIDE 1000 ML: 0.9 INJECTION, SOLUTION INTRAVENOUS at 11:03

## 2022-03-05 RX ADMIN — IOHEXOL 75 ML: 350 INJECTION, SOLUTION INTRAVENOUS at 10:03

## 2022-03-05 NOTE — ED NOTES
Anthony Hanna, a 66 y.o. male presents to the ED w/ complaint of multiple complaints. Pt states symptoms began Thursday night after having both flu and pneumonia shorts. Symptoms include SOB, cough, fever, general fatigue. Fever receptive to nsaids, tylenol. Checked O2 sats at home, reported lowest was 89%; this morning at home sat had returned to 97%. Symptoms are worse at night and relieved in the morning. Denies chest pain, n/v/d. No previous PMH of respiratory dx.     Triage note:  Chief Complaint   Patient presents with    Multiple complaints     Seen last night neg flu and covid last night , told to come to er for low pulse ox     Review of patient's allergies indicates:   Allergen Reactions    Pcn [penicillins] Anaphylaxis    Candesartan      Itching - drug rash     Past Medical History:   Diagnosis Date    Ankle fracture     Cataract     Essential hypertension     HLD (hyperlipidemia)     Nuclear sclerosis - Both Eyes 04/16/2013    patient states he is not familiar with this diagnosis

## 2022-03-05 NOTE — ED NOTES
I-STAT Chem-8+ Results:   Value Reference Range   Sodium 136 136-145 mmol/L   Potassium  3.8 3.5-5.1 mmol/L   Chloride 100  mmol/L   Ionized Calcium 1.14 1.06-1.42 mmol/L   CO2 (measured) 25 23-29 mmol/L   Glucose 100  mg/dL   BUN 28 6-30 mg/dL   Creatinine 1.3 0.5-1.4 mg/dL   Hematocrit 48 36-54%

## 2022-03-05 NOTE — PROGRESS NOTES
"Subjective:       Patient ID: Anthony Hanna is a 66 y.o. male.    Vitals:  height is 5' 9" (1.753 m) and weight is 97.5 kg (215 lb). His temperature is 98.8 °F (37.1 °C). His blood pressure is 147/88 (abnormal) and his pulse is 98. His respiration is 17 and oxygen saturation is 92% (abnormal).     Chief Complaint: Fever    Pt states he got flu and pna vaccine yesterday then last night started feeling feverish, aches. Improved some today. No documented temp. Cough normal for him from smoking not worse. No cp, sob. A little upset stomach/queasy from eating scampi, no NVD. Has a CT scan scheduled to r/o lung CA with hx of smoking. Saw pcp yesterday but do not see O2% documented. Had ER visit 2/26 where O2 documented 99%. Pt no cardiac hx, recent travel/imobilization/hx of dvt or pe.   O2% noted to be 92%. Pt in NAD.    Fever   This is a new problem. The current episode started today. The problem occurs intermittently. The problem has been unchanged. Associated symptoms include coughing (normal for pt). Pertinent negatives include no abdominal pain or nausea. He has tried acetaminophen and NSAIDs for the symptoms. The treatment provided mild relief.       Constitution: Positive for chills, sweating and fatigue. Negative for fever.   Respiratory: Positive for cough (normal for pt).    Gastrointestinal: Negative for abdominal pain and nausea.        Mild upset stomach   Musculoskeletal: Positive for muscle ache.       Objective:      Physical Exam   Constitutional: He is oriented to person, place, and time. He appears well-developed. He is cooperative.  Non-toxic appearance. He does not appear ill. No distress.   HENT:   Head: Normocephalic and atraumatic.   Ears:   Right Ear: Hearing, tympanic membrane, external ear and ear canal normal.   Left Ear: Hearing, tympanic membrane, external ear and ear canal normal.   Nose: Nose normal. No mucosal edema, rhinorrhea or nasal deformity. No epistaxis. Right sinus exhibits no " maxillary sinus tenderness and no frontal sinus tenderness. Left sinus exhibits no maxillary sinus tenderness and no frontal sinus tenderness.   Mouth/Throat: Uvula is midline, oropharynx is clear and moist and mucous membranes are normal. No trismus in the jaw. Normal dentition. No uvula swelling. No oropharyngeal exudate, posterior oropharyngeal edema, posterior oropharyngeal erythema, tonsillar abscesses or cobblestoning.   Eyes: Conjunctivae and lids are normal. No scleral icterus.   Neck: Trachea normal and phonation normal. Neck supple. No edema present. No erythema present. No neck rigidity present.   Cardiovascular: Normal rate, regular rhythm, normal heart sounds and normal pulses.   No murmur heard.  Pulmonary/Chest: Effort normal and breath sounds normal. No accessory muscle usage. No tachypnea. No respiratory distress. He has no decreased breath sounds. He has no wheezes. He has no rhonchi. He has no rales.   NAD able to speak in clear complete sentences without difficulty     No change in sx post treatment  O2 sat still 93-94% briefly, mostly around 92%  Walking O2 as low as 89% briefly mostly 90%, no sob with walking    Comments: NAD able to speak in clear complete sentences without difficulty     No change in sx post treatment  O2 sat still 93-94% briefly, mostly around 92%  Walking O2 as low as 89% briefly mostly 90%, no sob with walking    Abdominal: Normal appearance.   Musculoskeletal: Normal range of motion.         General: No deformity. Normal range of motion.      Right lower leg: No edema.      Left lower leg: No edema.   Neurological: He is alert and oriented to person, place, and time. He exhibits normal muscle tone. Coordination normal.   Skin: Skin is warm, dry, intact, not diaphoretic and not pale.   Psychiatric: His speech is normal and behavior is normal. Judgment and thought content normal.   Nursing note and vitals reviewed.          Results for orders placed or performed in visit on  03/04/22   POCT COVID-19 Rapid Screening   Result Value Ref Range    POC Rapid COVID Negative Negative     Acceptable Yes    POCT Influenza A/B MOLECULAR   Result Value Ref Range    POC Molecular Influenza A Ag Negative Negative, Not Reported    POC Molecular Influenza B Ag Negative Negative, Not Reported     Acceptable Yes      XR CHEST PA AND LATERAL    Result Date: 3/4/2022  EXAMINATION: XR CHEST PA AND LATERAL CLINICAL HISTORY: Cough, unspecified TECHNIQUE: PA and lateral views of the chest were performed. COMPARISON: None FINDINGS: The cardiomediastinal silhouette is not enlarged, magnified by technique..  There is no pleural effusion.  The trachea is midline.  The lungs are symmetrically expanded bilaterally with bilateral basilar bandlike atelectasis or scarring..  No large focal consolidation seen.  There is no pneumothorax.  The osseous structures are remarkable for degenerative changes..     1. Bilateral basilar subsegmental atelectasis or scarring, no large focal consolidation. Electronically signed by: Filippo Rojo MD Date:    03/04/2022 Time:    19:30      Assessment:       1. Fever, unspecified fever cause    2. Cough    3. Hypoxia          Plan:         Fever, unspecified fever cause  -     POCT COVID-19 Rapid Screening  -     POCT Influenza A/B MOLECULAR    Cough  -     XR CHEST PA AND LATERAL; Future; Expected date: 03/04/2022  -     albuterol nebulizer solution 2.5 mg    Hypoxia    unexpected hypoxia today on exam without clear cause, decreased as low as 89% with walking. Pt did not want to present to the ER tonight, discussed reasoning.   Urged pt to go if any worsening cough, cp, sob, leg swelling and to call pcp ASAP for follow up , pt and wife agreeable. Wife has portable pulse ox at home  Will get avs from portal     Total time on encounter 45 min      Patient Instructions   PLEASE READ YOUR DISCHARGE INSTRUCTIONS ENTIRELY AS IT CONTAINS IMPORTANT  INFORMATION.    Tylenol and ibuprofen as needed    Rest fluids    Call your pcp Monday for follow up    Please go to the emergency room if you experience chest pain, shortness of breath, funny heart beats, headache, blurred vision, weakness in one arm or leg, slurred speech, numbness, inability to walk or talk, confusion.     Please return or see your primary care doctor if you develop new or worsening symptoms.     You must understand that you have received an Urgent Care treatment only and that you may be released before all of your medical problems are known or treated.

## 2022-03-05 NOTE — PATIENT INSTRUCTIONS
PLEASE READ YOUR DISCHARGE INSTRUCTIONS ENTIRELY AS IT CONTAINS IMPORTANT INFORMATION.    Tylenol and ibuprofen as needed    Rest fluids    Call your pcp Monday for follow up    Please go to the emergency room if you experience chest pain, shortness of breath, funny heart beats, headache, blurred vision, weakness in one arm or leg, slurred speech, numbness, inability to walk or talk, confusion.     Please return or see your primary care doctor if you develop new or worsening symptoms.     You must understand that you have received an Urgent Care treatment only and that you may be released before all of your medical problems are known or treated.

## 2022-03-05 NOTE — ED NOTES
Pt given d/c paperwork, IV removed and dc'ed and walked out. Verbalized understanding, no questions.

## 2022-03-05 NOTE — ED PROVIDER NOTES
Source of History   Patient and wife    Chief Complaint   Multiple complaints (Seen last night neg flu and covid last night , told to come to er for low pulse ox)      History Of Present Illness   Anthony Hanna is a 66 y.o. male presenting with fever, diaphoresis, body aches and malaise that started 2 nights ago.  He was seen at urgent care yesterday had negative COVID and flu test.  He was found to be hypoxic there, but he denies shortness of breath.  He does state he has low energy.  No chest pain.  Since the symptoms returned after his urgent care visit, he decided to come to the ER and get checked as the urgent care recommended.  His wife is a CRNA and states that his pulse ox actually got better this morning after 1 episode.  She heard some wheezing on his exam overnight.  He has no history of reactive airway disease.  He did quit smoking 15 months ago.  His PCP has some scheduled for a CT scan to rule out lung cancer in a couple of weeks.    Review Of Systems   As per HPI and below:  General: Notes fever.  Notes chills.  Eyes: No visual changes.  Head: No headache.    Integument: No rashes or lesions.  Chest: No shortness of breath.  Cardiovascular: No chest pain.  Abdomen: No abdominal pain.  No nausea or vomiting.  Urinary: No abnormal urination.  Neurologic: No focal weakness.  No numbness.  Hematologic: No easy bruising.  Endocrine: No excessive thirst or urination.      Review of patient's allergies indicates:   Allergen Reactions    Pcn [penicillins] Anaphylaxis    Candesartan      Itching - drug rash       Current Facility-Administered Medications on File Prior to Encounter   Medication Dose Route Frequency Provider Last Rate Last Admin    [COMPLETED] albuterol nebulizer solution 2.5 mg  2.5 mg Nebulization 1 time in Clinic/HOD Darvin Leone NP   2.5 mg at 03/04/22 1925     Current Outpatient Medications on File Prior to Encounter   Medication Sig Dispense Refill    amLODIPine (NORVASC) 10  MG tablet TAKE 1 TABLET BY MOUTH EVERY DAY 30 tablet 11    atorvastatin (LIPITOR) 20 MG tablet Take 1 tablet (20 mg total) by mouth once daily. 90 tablet 3    citalopram (CELEXA) 40 MG tablet Take 1 tablet (40 mg total) by mouth once daily. 90 tablet 3    tiZANidine (ZANAFLEX) 4 MG tablet Take 1 tablet (4 mg total) by mouth every 8 (eight) hours as needed (no driving or operating heavy machinery.  May cause drowsiness.). 50 tablet 0    aspirin (ECOTRIN) 81 MG EC tablet Take 81 mg by mouth once daily.      triamcinolone acetonide 0.1% (KENALOG) 0.1 % cream AAA bid for 2-4 weeks per course. 454 g 0       Past History   As per HPI and below:  Past Medical History:   Diagnosis Date    Ankle fracture     Cataract     Essential hypertension     HLD (hyperlipidemia)     Nuclear sclerosis - Both Eyes 2013    patient states he is not familiar with this diagnosis      Past Surgical History:   Procedure Laterality Date    BACK SURGERY      COLONOSCOPY N/A 2018    Procedure: COLONOSCOPY;  Surgeon: PUMA Fernandez MD;  Location: [address];  Service: Endoscopy;  Laterality: N/A;    FRACTURE SURGERY      right ankle         Social History     Socioeconomic History    Marital status:    Tobacco Use    Smoking status: Former Smoker     Packs/day: 0.25     Years: 15.00     Pack years: 3.75     Types: Cigarettes     Quit date: 10/31/2020     Years since quittin.3    Smokeless tobacco: Never Used    Tobacco comment: In Smoking cessation program   Substance and Sexual Activity    Alcohol use: Yes     Alcohol/week: 0.0 standard drinks     Comment: couple beers a week or less    Drug use: No    Sexual activity: Yes     Partners: Female     Comment:      Social Determinants of Health     Financial Resource Strain: Low Risk     Difficulty of Paying Living Expenses: Not hard at all   Food Insecurity: No Food Insecurity    Worried About Running Out of Food in the Last Year: Never  true    Ran Out of Food in the Last Year: Never true   Transportation Needs: No Transportation Needs    Lack of Transportation (Medical): No    Lack of Transportation (Non-Medical): No   Physical Activity: Insufficiently Active    Days of Exercise per Week: 1 day    Minutes of Exercise per Session: 90 min   Stress: Stress Concern Present    Feeling of Stress : To some extent   Social Connections: Unknown    Frequency of Communication with Friends and Family: More than three times a week    Frequency of Social Gatherings with Friends and Family: More than three times a week    Active Member of Clubs or Organizations: No    Attends Club or Organization Meetings: Patient refused    Marital Status:        Family History   Problem Relation Age of Onset    Glaucoma Mother     Stroke Mother     Heart disease Mother         valve replacement    No Known Problems Father     No Known Problems Sister     Hypertension Brother     Hyperlipidemia Brother     No Known Problems Maternal Aunt     No Known Problems Maternal Uncle     No Known Problems Paternal Aunt     No Known Problems Paternal Uncle     Heart disease Maternal Grandmother     Heart disease Maternal Grandfather     No Known Problems Paternal Grandmother     No Known Problems Paternal Grandfather     Amblyopia Neg Hx     Blindness Neg Hx     Cancer Neg Hx     Cataracts Neg Hx     Diabetes Neg Hx     Macular degeneration Neg Hx     Retinal detachment Neg Hx     Strabismus Neg Hx     Thyroid disease Neg Hx        Physical Exam     Vitals:    03/05/22 1115 03/05/22 1116 03/05/22 1124 03/05/22 1136   BP:       Pulse:    84   Resp:  20     Temp:       TempSrc:       SpO2: 96%  (S) 95%  Comment: AMBULATORY 96%   Weight:       Height:         Appearance: No acute distress.  Skin: No rashes seen.  Good turgor.  No abrasions.  No ecchymoses.  Eyes: No conjunctival injection.  ENT: Oropharynx clear.    Chest: Clear to auscultation  bilaterally.  Good air movement.  No wheezes.  No rhonchi.  Cardiovascular: Regular rate and rhythm.  No murmurs. No gallops. No rubs.  Abdomen: Soft.  Not distended.  Nontender.  No guarding.  No rebound.  Musculoskeletal: Good range of motion all joints.  No deformities.  Neck supple.  No meningismus.  Neurologic: Motor intact.  Sensation intact.  Cerebellar intact.  Cranial nerves intact.  Mental Status:  Alert and oriented x 3.  Appropriate, conversant.      Initial MDM   Viral syndrome with hypoxia last night and the night before, still little bit tachypneic, hypoxic has seemingly improved in the ED, although still slightly low.  His chest x-ray yesterday which showed a little bit of atelectasis but was otherwise unremarkable.  Viviana do a PE study given his scheduled upcoming CT anyway, recheck COVID and labs.  Give him some fluids for comfort.  Will make disposition based on imaging and further testing.    I decided to obtain the patient's medical records.    Medications   sodium chloride 0.9% bolus 1,000 mL (1,000 mLs Intravenous New Bag 3/5/22 1120)   iohexoL (OMNIPAQUE 350) injection 100 mL (75 mLs Intravenous Given 3/5/22 1052)       Results and Course     Labs Reviewed   CBC W/ AUTO DIFFERENTIAL - Abnormal; Notable for the following components:       Result Value    WBC 13.68 (*)     Platelets 149 (*)     Gran # (ANC) 10.2 (*)     Immature Grans (Abs) 0.07 (*)     Mono # 1.8 (*)     Gran % 74.8 (*)     Lymph % 10.9 (*)     All other components within normal limits   COMPREHENSIVE METABOLIC PANEL - Abnormal; Notable for the following components:    CO2 21 (*)     All other components within normal limits   URINALYSIS, REFLEX TO URINE CULTURE - Abnormal; Notable for the following components:    Protein, UA 1+ (*)     Ketones, UA Trace (*)     Occult Blood UA 1+ (*)     All other components within normal limits    Narrative:     Specimen Source->Urine   URINALYSIS MICROSCOPIC - Abnormal; Notable for the  following components:    Hyaline Casts, UA 8 (*)     All other components within normal limits    Narrative:     Specimen Source->Urine   HEPATITIS C ANTIBODY   SARS-COV-2 (COVID-19) QUALITATIVE PCR   ISTAT PROCEDURE   ISTAT CHEM8       Imaging Results          CTA Chest Non-Coronary (PE Study) (Final result)  Result time 03/05/22 11:04:02    Final result by Shanita Bocanegra MD (03/05/22 11:04:02)                 Impression:      1. Limited evaluation for acute PE to the level of the lobar arteries due to imperfect timing of the contrast bolus.  Within this limitation, no acute pulmonary embolism.  2. Bilateral lower lobe atelectasis.  3. Moderate left-sided coronary artery calcifications.      Electronically signed by: Shanita Bocanegra  Date:    03/05/2022  Time:    11:04             Narrative:    EXAMINATION:  CTA CHEST NON CORONARY    CLINICAL HISTORY:  Pulmonary embolism (PE) suspected, unknown D-dimer;hypoxia, recent negative CXR;    TECHNIQUE:  Low dose axial images, sagittal and coronal reformations were obtained from the thoracic inlet to the lung bases following the IV administration of 75 mL of Omnipaque 350.  Contrast timing was optimized to evaluate the pulmonary arteries.  MIP images were performed.    COMPARISON:  Chest radiograph from 03/04/2022    FINDINGS:  Exam quality: Satisfactory only for the evaluation of acute PE to the level of the lobar arteries due to imperfect timing of the contrast bolus.    Pulmonary embolism: No acute or chronic pulmonary embolism.    Central pulmonary arteries: Normal caliber.    Aorta: Normal caliber.    Heart: No right heart strain. Normal size.    Coronary arteries: Moderate left-sided coronary artery calcifications    Pericardium: Normal. No effusion, thickening, or calcification.    Support tubes and lines: None.    Base of neck/thyroid: Normal.    Lymph nodes: No supraclavicular, axillary, internal mammary, mediastinal, or hilar adenopathy.    Esophagus:  Normal.    Pleura: No effusion, thickening, or calcification.    Upper abdomen: Unremarkable    Body wall: Unremarkable    Airways: Normal.    Lungs: Bilateral lower lobe atelectasis.  The lungs are otherwise clear.    Bones: No focal lesion.                                ED Course as of 03/05/22 1235   Sat Mar 05, 2022   1153 Creatinine: 1.2 [DC]   1153 WBC(!): 13.68 [DC]   1153 Hemoglobin: 15.7 [DC]   1153 Platelets(!): 149 [DC]   1153 WBC, UA: 1 [DC]      ED Course User Index  [DC] Jose Alexandre MD           MDM, Impression and Plan   66 y.o. male with viral syndrome versus post vaccination adverse reaction.  Hypoxia resolved spontaneously in the ED.  PE study shows no pneumonia or blood clot; there is some atelectasis present.  Labs are benign.  Will discharge with incentive spirometry, follow-up PCP.  No indication for antibiotics.         Final diagnoses:  [B34.9] Viral syndrome (Primary)          ED Disposition Condition    Discharge Stable        ED Prescriptions     None        Follow-up Information     Follow up With Specialties Details Why Contact Info    Zhang Denise MD Internal Medicine Call   [address] [facility] [address]  [phone]             Jose Alexandre MD  03/05/22 6168

## 2022-03-06 ENCOUNTER — TELEPHONE (OUTPATIENT)
Dept: INTERNAL MEDICINE | Facility: CLINIC | Age: 67
End: 2022-03-06
Payer: MEDICARE

## 2022-03-06 DIAGNOSIS — Z12.5 PROSTATE CANCER SCREENING: Primary | ICD-10-CM

## 2022-03-09 LAB — HCV AB SERPL QL IA: NEGATIVE

## 2022-03-18 ENCOUNTER — HOSPITAL ENCOUNTER (OUTPATIENT)
Dept: RADIOLOGY | Facility: HOSPITAL | Age: 67
Discharge: HOME | End: 2022-03-18
Attending: INTERNAL MEDICINE
Payer: MEDICARE

## 2022-03-18 DIAGNOSIS — R05.3 CHRONIC COUGH: ICD-10-CM

## 2022-03-18 DIAGNOSIS — Z87.891 HISTORY OF TOBACCO ABUSE: ICD-10-CM

## 2022-03-18 PROCEDURE — 71271 CT CHEST LUNG SCREENING LOW DOSE: ICD-10-PCS | Mod: 26,,, | Performed by: RADIOLOGY

## 2022-03-18 PROCEDURE — 71271 CT THORAX LUNG CANCER SCR C-: CPT | Mod: 26,,, | Performed by: RADIOLOGY

## 2022-03-18 PROCEDURE — 71271 CT THORAX LUNG CANCER SCR C-: CPT | Mod: TC

## 2022-04-13 ENCOUNTER — IMMUNIZATION (OUTPATIENT)
Dept: PRIMARY CARE CLINIC | Facility: CLINIC | Age: 67
End: 2022-04-13
Payer: MEDICARE

## 2022-04-13 DIAGNOSIS — Z23 NEED FOR VACCINATION: Primary | ICD-10-CM

## 2022-04-13 PROCEDURE — 91305 COVID-19, MRNA, LNP-S, PF, 30 MCG/0.3 ML DOSE VACCINE (PFIZER): CPT | Mod: PBBFAC | Performed by: INTERNAL MEDICINE

## 2022-06-27 ENCOUNTER — CLINICAL SUPPORT (OUTPATIENT)
Dept: SMOKING CESSATION | Facility: CLINIC | Age: 67
End: 2022-06-27
Payer: COMMERCIAL

## 2022-06-27 DIAGNOSIS — F17.200 NICOTINE DEPENDENCE: Primary | ICD-10-CM

## 2022-06-27 PROCEDURE — 99406 PR TOBACCO USE CESSATION INTERMEDIATE 3-10 MINUTES: ICD-10-PCS | Mod: S$GLB,,,

## 2022-06-27 PROCEDURE — 99406 BEHAV CHNG SMOKING 3-10 MIN: CPT | Mod: S$GLB,,,

## 2022-06-27 NOTE — PROGRESS NOTES
Spoke with patient today in regard to smoking cessation progress for 12-month telephone follow up. Patient states that he is tobacco free. Commended patient on their quit. Informed patient of benefit period, future follow up, and contact information if any further help or support is needed. Will complete smart form for 12 month follow up and resolve Quit attempt #3.

## 2022-07-06 RX ORDER — ATORVASTATIN CALCIUM 20 MG/1
20 TABLET, FILM COATED ORAL DAILY
Qty: 90 TABLET | Refills: 2 | Status: SHIPPED | OUTPATIENT
Start: 2022-07-06 | End: 2022-07-06

## 2022-07-06 RX ORDER — CITALOPRAM 40 MG/1
40 TABLET, FILM COATED ORAL DAILY
Qty: 90 TABLET | Refills: 2 | Status: SHIPPED | OUTPATIENT
Start: 2022-07-06 | End: 2023-04-03 | Stop reason: SDUPTHER

## 2022-07-19 ENCOUNTER — OFFICE VISIT (OUTPATIENT)
Dept: OPTOMETRY | Facility: CLINIC | Age: 67
End: 2022-07-19
Payer: MEDICARE

## 2022-07-19 DIAGNOSIS — H52.7 REFRACTIVE ERROR: ICD-10-CM

## 2022-07-19 DIAGNOSIS — H25.13 NUCLEAR SCLEROSIS OF BOTH EYES: Primary | ICD-10-CM

## 2022-07-19 PROCEDURE — 99999 PR PBB SHADOW E&M-EST. PATIENT-LVL II: ICD-10-PCS | Mod: PBBFAC,,, | Performed by: OPTOMETRIST

## 2022-07-19 PROCEDURE — 92015 DETERMINE REFRACTIVE STATE: CPT | Mod: ,,, | Performed by: OPTOMETRIST

## 2022-07-19 PROCEDURE — 92014 PR EYE EXAM, EST PATIENT,COMPREHESV: ICD-10-PCS | Mod: S$PBB,,, | Performed by: OPTOMETRIST

## 2022-07-19 PROCEDURE — 92015 PR REFRACTION: ICD-10-PCS | Mod: ,,, | Performed by: OPTOMETRIST

## 2022-07-19 PROCEDURE — 99212 OFFICE O/P EST SF 10 MIN: CPT | Mod: PBBFAC | Performed by: OPTOMETRIST

## 2022-07-19 PROCEDURE — 92014 COMPRE OPH EXAM EST PT 1/>: CPT | Mod: S$PBB,,, | Performed by: OPTOMETRIST

## 2022-07-19 PROCEDURE — 99999 PR PBB SHADOW E&M-EST. PATIENT-LVL II: CPT | Mod: PBBFAC,,, | Performed by: OPTOMETRIST

## 2022-07-19 NOTE — PROGRESS NOTES
HPI     65 Y/o male is here for routine eye exam with no C/o about ocular health   Pt wears PAL does not have them present today    Pt denies pain and discomfort   Occasional floaters     Eye med: no gtt    Last edited by Tameka Ac MA on 7/19/2022  9:02 AM. (History)            Assessment /Plan     For exam results, see Encounter Report.    Nuclear sclerosis of both eyes    Refractive error      1. Educated pt on presence of cataracts and effects on vision. No surgery at this time. Recheck in one year.  2. Spectacle Rx given, discussed different options for glasses. RTC 1 year routine eye exam.

## 2022-07-27 ENCOUNTER — PATIENT MESSAGE (OUTPATIENT)
Dept: INTERNAL MEDICINE | Facility: CLINIC | Age: 67
End: 2022-07-27
Payer: MEDICARE

## 2022-07-28 ENCOUNTER — HOSPITAL ENCOUNTER (EMERGENCY)
Facility: HOSPITAL | Age: 67
Discharge: HOME | End: 2022-07-28
Attending: EMERGENCY MEDICINE
Payer: MEDICARE

## 2022-07-28 VITALS
SYSTOLIC BLOOD PRESSURE: 176 MMHG | TEMPERATURE: 99 F | DIASTOLIC BLOOD PRESSURE: 98 MMHG | HEART RATE: 81 BPM | OXYGEN SATURATION: 95 % | RESPIRATION RATE: 18 BRPM

## 2022-07-28 DIAGNOSIS — S05.02XA ABRASION OF LEFT CORNEA, INITIAL ENCOUNTER: Primary | ICD-10-CM

## 2022-07-28 PROCEDURE — 99284 EMERGENCY DEPT VISIT MOD MDM: CPT | Mod: ,,, | Performed by: PHYSICIAN ASSISTANT

## 2022-07-28 PROCEDURE — 25000003 PHARM REV CODE 250: Performed by: EMERGENCY MEDICINE

## 2022-07-28 PROCEDURE — 25000003 PHARM REV CODE 250: Performed by: PHYSICIAN ASSISTANT

## 2022-07-28 PROCEDURE — 99284 PR EMERGENCY DEPT VISIT,LEVEL IV: ICD-10-PCS | Mod: ,,, | Performed by: PHYSICIAN ASSISTANT

## 2022-07-28 PROCEDURE — 99284 EMERGENCY DEPT VISIT MOD MDM: CPT | Mod: 25

## 2022-07-28 PROCEDURE — 65220 REMOVE FOREIGN BODY FROM EYE: CPT

## 2022-07-28 RX ORDER — ERYTHROMYCIN 5 MG/G
OINTMENT OPHTHALMIC
Qty: 1 G | Refills: 0 | Status: SHIPPED | OUTPATIENT
Start: 2022-07-28 | End: 2023-05-02

## 2022-07-28 RX ORDER — HYDROCODONE BITARTRATE AND ACETAMINOPHEN 5; 325 MG/1; MG/1
1 TABLET ORAL
Status: COMPLETED | OUTPATIENT
Start: 2022-07-28 | End: 2022-07-28

## 2022-07-28 RX ORDER — HYDROCODONE BITARTRATE AND ACETAMINOPHEN 5; 325 MG/1; MG/1
1 TABLET ORAL EVERY 6 HOURS PRN
Qty: 12 TABLET | Refills: 0 | Status: SHIPPED | OUTPATIENT
Start: 2022-07-28 | End: 2023-05-02

## 2022-07-28 RX ORDER — MOXIFLOXACIN 5 MG/ML
1 SOLUTION/ DROPS OPHTHALMIC 3 TIMES DAILY
Qty: 3 ML | Refills: 0 | Status: SHIPPED | OUTPATIENT
Start: 2022-07-28 | End: 2022-07-28 | Stop reason: SDUPTHER

## 2022-07-28 RX ORDER — MOXIFLOXACIN 5 MG/ML
1 SOLUTION/ DROPS OPHTHALMIC
Status: DISCONTINUED | OUTPATIENT
Start: 2022-07-28 | End: 2022-07-28 | Stop reason: HOSPADM

## 2022-07-28 RX ORDER — MOXIFLOXACIN 5 MG/ML
1 SOLUTION/ DROPS OPHTHALMIC 3 TIMES DAILY
Qty: 3 ML | Refills: 0 | OUTPATIENT
Start: 2022-07-28 | End: 2022-07-28 | Stop reason: SDUPTHER

## 2022-07-28 RX ORDER — MOXIFLOXACIN 5 MG/ML
1 SOLUTION/ DROPS OPHTHALMIC 3 TIMES DAILY
Qty: 3 ML | Refills: 0 | Status: SHIPPED | OUTPATIENT
Start: 2022-07-28 | End: 2023-05-02

## 2022-07-28 RX ORDER — TETRACAINE HYDROCHLORIDE 5 MG/ML
2 SOLUTION OPHTHALMIC
Status: COMPLETED | OUTPATIENT
Start: 2022-07-28 | End: 2022-07-28

## 2022-07-28 RX ADMIN — HYDROCODONE BITARTRATE AND ACETAMINOPHEN 1 TABLET: 5; 325 TABLET ORAL at 01:07

## 2022-07-28 RX ADMIN — TETRACAINE HYDROCHLORIDE 2 DROP: 5 SOLUTION OPHTHALMIC at 10:07

## 2022-07-28 RX ADMIN — FLUORESCEIN SODIUM 1 EACH: 1 STRIP OPHTHALMIC at 10:07

## 2022-07-28 NOTE — ED NOTES
"Pt not wanting to wait for eye drop medication here in the ED. Requested printed scrip to go home with. Pt states "I can get it for free I don't want to pay an arm and a leg". Pt being uncooperative and refusing VS for discharge. PA given printed prescription and discharged patient.   "

## 2022-07-28 NOTE — ED NOTES
Pt reports on Sunday woke up with left eye pain.  Pt states feels like a constant pressure behind left eye, not blurred vision, but + photophobia to both eyes.  No pain to right eye.  Pt denies injury.  Pt reports Tylenol does not relieve the pain .    LOC: The patient is awake, alert and aware of environment with an appropriate affect, the patient is oriented x 3 and speaking appropriately.  APPEARANCE: Patient resting comfortably and in no acute distress, patient is clean and well groomed, patient's clothing is properly fastened.  SKIN: The skin is warm and dry, color consistent with ethnicity, patient has normal skin turgor and moist mucus membranes, skin intact, no breakdown or bruising noted.  MUSCULOSKELETAL: Patient moving all extremities spontaneously, no obvious swelling or deformities noted.  RESPIRATORY: Airway is open and patent, respirations are spontaneous, patient has a normal effort and rate, no accessory muscle use noted.  EYES:  Redness noted to left sclera.

## 2022-07-28 NOTE — ED PROVIDER NOTES
"Encounter Date: 7/28/2022       History     Chief Complaint   Patient presents with    Eye Pain     Left eye pain, denies any vision changes, "Pain feels like it's in the back of the eye", new onset photophobia since yesterday. Currently on Abx for tooth abscess, on day 4      Patient is a 66-year-old male with history of hypertension and hyperlipidemia who presents to the emergency department with left eye pain.  Patient states he went to bed on Saturday night feeling fine.  He states he woke up Sunday morning with extreme pain to the left eye.  He reports this has happened in the past and he had a dental infection.  He states he went to the dentist and was told that he had no signs of infection.  He reports the pain has been persistent.  He reports photophobia.  He denies visual changes.  He denies any other neurological deficits.  He states he recently saw his eye doctor over the last couple of weeks and everything was normal.  He denies known injury.  He does admit to cutting grass over the weekend, but states he did not have anything injure his eye.    The history is provided by the patient.     Review of patient's allergies indicates:   Allergen Reactions    Pcn [penicillins] Anaphylaxis    Candesartan      Itching - drug rash     Past Medical History:   Diagnosis Date    Ankle fracture     Cataract     Essential hypertension     HLD (hyperlipidemia)     Nuclear sclerosis - Both Eyes 04/16/2013    patient states he is not familiar with this diagnosis      Past Surgical History:   Procedure Laterality Date    BACK SURGERY      COLONOSCOPY N/A 12/11/2018    Procedure: COLONOSCOPY;  Surgeon: PUMA Fernandez MD;  Location: [address]);  Service: Endoscopy;  Laterality: N/A;    FRACTURE SURGERY      right ankle       Family History   Problem Relation Age of Onset    Glaucoma Mother     Stroke Mother     Heart disease Mother         valve replacement    No Known Problems Father     No Known " Problems Sister     Hypertension Brother     Hyperlipidemia Brother     No Known Problems Maternal Aunt     No Known Problems Maternal Uncle     No Known Problems Paternal Aunt     No Known Problems Paternal Uncle     Heart disease Maternal Grandmother     Heart disease Maternal Grandfather     No Known Problems Paternal Grandmother     No Known Problems Paternal Grandfather     Amblyopia Neg Hx     Blindness Neg Hx     Cancer Neg Hx     Cataracts Neg Hx     Diabetes Neg Hx     Macular degeneration Neg Hx     Retinal detachment Neg Hx     Strabismus Neg Hx     Thyroid disease Neg Hx      Social History     Tobacco Use    Smoking status: Former Smoker     Packs/day: 0.25     Years: 15.00     Pack years: 3.75     Types: Cigarettes     Quit date: 10/31/2020     Years since quittin.7    Smokeless tobacco: Never Used    Tobacco comment: In Smoking cessation program   Substance Use Topics    Alcohol use: Yes     Alcohol/week: 0.0 standard drinks     Comment: couple beers a week or less    Drug use: No     Review of Systems   Constitutional: Negative for activity change, appetite change, chills, fatigue and fever.   HENT: Negative for congestion, ear discharge, ear pain, postnasal drip, rhinorrhea, sore throat and trouble swallowing.    Eyes: Positive for photophobia and pain. Negative for visual disturbance.   Respiratory: Negative for cough and shortness of breath.    Cardiovascular: Negative for chest pain.   Gastrointestinal: Negative for abdominal pain, blood in stool, constipation, diarrhea, nausea and vomiting.   Genitourinary: Negative for dysuria, flank pain and hematuria.   Musculoskeletal: Negative for back pain, neck pain and neck stiffness.   Skin: Negative for rash and wound.   Neurological: Negative for dizziness, weakness, light-headedness and headaches.       Physical Exam     Initial Vitals [22 0915]   BP Pulse Resp Temp SpO2   (!) 176/98 81 18 99 °F (37.2 °C) 96 %       MAP       --         Physical Exam    Nursing note and vitals reviewed.  Constitutional: He appears well-developed and well-nourished. He is not diaphoretic.  Non-toxic appearance. No distress.   HENT:   Head: Normocephalic.   Right Ear: External ear normal.   Left Ear: External ear normal.   Nose: Nose normal.   Mouth/Throat: Oropharynx is clear and moist. No oropharyngeal exudate.   Eyes: EOM and lids are normal. Pupils are equal, round, and reactive to light. Right eye exhibits no chemosis, no discharge, no exudate and no hordeolum. Left eye exhibits no chemosis, no discharge, no exudate and no hordeolum. Left conjunctiva is injected (on medial aspect). No scleral icterus.       Pinpoint corneal abrasion noted at 10'oclock.  On initial fluorescein exam, there is a mobile particle.  This is removed with a Q-tip and underlying as the small corneal abrasion.   Neck:   Normal range of motion.  Cardiovascular: Normal rate and regular rhythm.   Pulmonary/Chest: Breath sounds normal. No respiratory distress. He has no wheezes. He has no rhonchi. He has no rales. He exhibits no tenderness.   Abdominal: Abdomen is soft. Bowel sounds are normal. There is no abdominal tenderness.   Musculoskeletal:      Cervical back: Normal range of motion.     Lymphadenopathy:     He has no cervical adenopathy.   Neurological: He is alert and oriented to person, place, and time. He has normal strength.   Skin: Skin is warm and dry. Capillary refill takes less than 2 seconds.   Psychiatric: He has a normal mood and affect.         ED Course   Foreign Body    Date/Time: 7/28/2022 10:34 AM  Performed by: Mary Muse PA-C  Authorized by: Giovany Reis MD   Body area: eye  Location details: left cornea    Anesthesia:  Local Anesthetic: tetracaine drops  Localization method: visualized  Removal mechanism: moist cotton swab  Eye examined with fluorescein.  Fluorescein uptake.  Corneal abrasion size: small  Corneal  abrasion location: superior and medial  Depth: superficial  Complexity: simple  Post-procedure assessment: foreign body removed  Patient tolerance: Patient tolerated the procedure well with no immediate complications      Labs Reviewed - No data to display       Imaging Results    None          Medications   TETRAcaine HCl (PF) 0.5 % Drop 2 drop (has no administration in time range)   fluorescein ophthalmic strip 1 each (has no administration in time range)   moxifloxacin 0.5 % ophthalmic solution 1 drop (has no administration in time range)   HYDROcodone-acetaminophen 5-325 mg per tablet 1 tablet (has no administration in time range)     Medical Decision Making:   Initial Assessment:   Urgent evaluation of a 66-year-old male who presents to the emergency department with left eye pain.  Patient is afebrile, nontoxic-appearing, and hemodynamically stable.  On exam, the left conjunctiva is mildly injected.  Tetracaine is applied and pressures are taken.  Pressure is normal in the left eye.  Fluorescein is applied.  There is a mobile particle noted at the superior portion of the medial cornea.  Moist Q-tip is used to remove.  Underlying is a small sore seen uptake indicating corneal abrasion.  Findings are discussed with patient and his wife.  His wife is a CRNA here at this hospital.  She is concerned that something else might be going on as he has severe pain in the back of his eye.  We will consult Ophthalmology for further exam.  ED Management:  10:23 AM  Paging ophthalmology at this time.    1:14 PM  Ophthalmology has evaluated patient.  Agrees that he has corneal abrasion.  Plan has been discussed with patient.  Patient will be discharged at this time with instructions to follow-up with ophthalmology as needed.  Advised to return to the emergency department with any worsening symptoms or concerns.    1:38 PM  At discharge, pt states the plan was not discussed with him.  Reviewed plan again with patient - drops  three times a day, ointment once at bedtime, and follow up with ophth.  He expresses his understanding.                      Clinical Impression:   Final diagnoses:  [S05.02XA] Abrasion of left cornea, initial encounter (Primary)          ED Disposition Condition    Discharge Stable        ED Prescriptions     Medication Sig Dispense Start Date End Date Auth. Provider    erythromycin (ROMYCIN) ophthalmic ointment Place a 1/2 inch ribbon of ointment into the lower eyelid, at bedtime 1 g 7/28/2022  Giovany Reis MD    HYDROcodone-acetaminophen (NORCO) 5-325 mg per tablet Take 1 tablet by mouth every 6 (six) hours as needed for Pain. 12 tablet 7/28/2022  Giovany Reis MD        Follow-up Information     Follow up With Specialties Details Why Contact Info    [facility] OPHTHALMOLOGY Ophthalmology   [facility] [facility] [address]  [phone]           Mary Muse PA-C  07/28/22 1315       Mary Muse PA-C  07/28/22 1843

## 2022-07-28 NOTE — CONSULTS
"Consultation Report  Ophthalmology Service    Date: 07/28/2022    Chief complaint/Reason for Consult: "eye pain"     History of Present Illness: Anthony Hanna is a 66 y.o. male with no significant POHx who presents for evaluation of LEFT eye pain. Patient states he woke up Guy morning with severe pain in left eye. Reports cutting grass Saturday, but does not recall any trauma to the eye. Reports constant pain and redness of the eye with some grittiness/fbs, but denies any photophobia, visual changes, drainage, swelling, flashes, floaters, or curtains/veils over vision. Does not wear contacts or use gtts.     POcularHx: Denies history of ocular problems or past ocular surgeries.    Current eye gtts: Denies     Family Hx: Denies family history of glaucoma, macular degeneration, or blindness. family history includes Glaucoma in his mother; Heart disease in his maternal grandfather, maternal grandmother, and mother; Hyperlipidemia in his brother; Hypertension in his brother; No Known Problems in his father, maternal aunt, maternal uncle, paternal aunt, paternal grandfather, paternal grandmother, paternal uncle, and sister; Stroke in his mother.     PMHx:  has a past medical history of Ankle fracture, Cataract, Essential hypertension, HLD (hyperlipidemia), and Nuclear sclerosis - Both Eyes (04/16/2013).     PSurgHx:  has a past surgical history that includes Back surgery; Fracture surgery; right ankle; and Colonoscopy (N/A, 12/11/2018).     Home Medications:   Prior to Admission medications    Medication Sig Start Date End Date Taking? Authorizing Provider   amLODIPine (NORVASC) 10 MG tablet TAKE 1 TABLET BY MOUTH EVERY DAY 3/4/22   Zhang Denise MD   aspirin (ECOTRIN) 81 MG EC tablet Take 81 mg by mouth once daily.    Historical Provider   atorvastatin (LIPITOR) 20 MG tablet TAKE 1 TABLET BY MOUTH EVERY DAY 7/6/22   Zhang Denise MD   citalopram (CELEXA) 40 MG tablet Take 1 tablet (40 mg total) by mouth once daily. 7/6/22  "  Zhang Denise MD   erythromycin (ROMYCIN) ophthalmic ointment Place a 1/2 inch ribbon of ointment into the lower eyelid, at bedtime 7/28/22   Giovany Reis MD   HYDROcodone-acetaminophen (NORCO) 5-325 mg per tablet Take 1 tablet by mouth every 6 (six) hours as needed for Pain. 7/28/22   Giovany Reis MD   triamcinolone acetonide 0.1% (KENALOG) 0.1 % cream AAA bid for 2-4 weeks per course. 9/4/20   Purvi Tamayo MD        Medications this encounter:    fluorescein  1 strip Left Eye ED 1 Time    moxifloxacin  1 drop Left Eye ED 1 Time    TETRAcaine HCl (PF)  2 drop Left Eye ED 1 Time       Allergies: is allergic to pcn [penicillins] and candesartan.     Social:  reports that he quit smoking about 20 months ago. His smoking use included cigarettes. He has a 3.75 pack-year smoking history. He has never used smokeless tobacco. He reports current alcohol use. He reports that he does not use drugs.     ROS: As per HPI    Ocular examination/Dilated fundus examination:  Base Eye Exam     Visual Acuity (Snellen - Linear)       Right Left    Dist sc 20/25 20/25    Dist ph sc ni ni          Tonometry (Tonopen, 1:20 PM)       Right Left    Pressure  14   Pt declined OD           Pupils       Pupils Dark Light Shape React APD    Right PERRL 4 3 Round Brisk None    Left PERRL 4 3 Round Brisk None          Visual Fields       Right Left     Full Full          Extraocular Movement       Right Left     Full, Ortho Full, Ortho          Neuro/Psych     Oriented x3: Yes          Dilation     Both eyes: 1% Tropicamide, 2.5% Phenylephrine, 2% Cyclopentolate @ 1:20 PM            Slit Lamp and Fundus Exam     External Exam       Right Left    External Normal Normal          Slit Lamp Exam       Right Left    Lids/Lashes Normal Normal    Conjunctiva/Sclera White and quiet 1+ Injection with SN abrasion extending over limbus    Cornea Clear Linear K abrasion and full thickness epi defect SN extending from conj to 2mm on K  surface w/ fluorescein uptake, mild ~10% ant stomal involvement w/ no stromal haze or edema     Anterior Chamber Deep and quiet Deep and quiet    Iris Round and reactive Round and reactive    Lens 2+ Nuclear sclerosis 2+ Nuclear sclerosis    Anterior Vitreous Normal Normal          Fundus Exam       Right Left    Disc  Pink & sharp    C/D Ratio  0.3    Macula  Flat    Vessels  Normal    Periphery  Flat w/o holes/tears/detachment     Patient declined DFE OD                  Assessment/Plan:     1. Corneal Abrasion, LEFT eye   - Patient w/ 5 days of OS pain and mild injection upon awakening without significant photophobia, visual changes, drainage, or periorbital swelling  - No contact lens use or POHx   - Pt deferred most of OD exam   - Per ED, small loose piece of epithelial tissue removed w/ qtip prior to exam   - OS Exam w/ nl IOP, PERRL, VA 20/25, 1+ nasal conj injection w/ K abrasion SN w/ no stromal swelling/haze, no AC reaction, DFE wnl   - START Vigamox gtts QID, Emycin ointment qhs, and PFATs   - Return precautions discussed   - Plan to follow up early next week in triage clinic for K check     Patient's Best Contact Number: [phone]    Telly García MD  [employer] [facility], PGY-2  07/28/2022  1:22 PM

## 2022-09-20 ENCOUNTER — IMMUNIZATION (OUTPATIENT)
Dept: INTERNAL MEDICINE | Facility: CLINIC | Age: 67
End: 2022-09-20
Payer: MEDICARE

## 2022-09-20 DIAGNOSIS — Z23 NEED FOR VACCINATION: Primary | ICD-10-CM

## 2022-09-20 PROCEDURE — 0124A COVID-19, MRNA, LNP-S, BIVALENT BOOSTER, PF, 30 MCG/0.3 ML DOSE: CPT | Mod: PBBFAC,CV19

## 2022-09-20 PROCEDURE — 91312 COVID-19, MRNA, LNP-S, BIVALENT BOOSTER, PF, 30 MCG/0.3 ML DOSE: CPT | Mod: PBBFAC

## 2022-11-09 ENCOUNTER — OFFICE VISIT (OUTPATIENT)
Dept: GASTROENTEROLOGY | Facility: CLINIC | Age: 67
End: 2022-11-09
Payer: MEDICARE

## 2022-11-09 ENCOUNTER — TELEPHONE (OUTPATIENT)
Dept: GASTROENTEROLOGY | Facility: CLINIC | Age: 67
End: 2022-11-09
Payer: MEDICARE

## 2022-11-09 ENCOUNTER — LAB VISIT (OUTPATIENT)
Dept: LAB | Facility: HOSPITAL | Age: 67
End: 2022-11-09
Attending: STUDENT IN AN ORGANIZED HEALTH CARE EDUCATION/TRAINING PROGRAM
Payer: MEDICARE

## 2022-11-09 VITALS
WEIGHT: 209.44 LBS | DIASTOLIC BLOOD PRESSURE: 91 MMHG | SYSTOLIC BLOOD PRESSURE: 144 MMHG | HEART RATE: 77 BPM | BODY MASS INDEX: 31.02 KG/M2 | HEIGHT: 69 IN

## 2022-11-09 DIAGNOSIS — Z12.5 PROSTATE CANCER SCREENING: ICD-10-CM

## 2022-11-09 DIAGNOSIS — K80.20 CALCULUS OF GALLBLADDER WITHOUT CHOLECYSTITIS WITHOUT OBSTRUCTION: ICD-10-CM

## 2022-11-09 DIAGNOSIS — R10.9 ABDOMINAL PAIN, UNSPECIFIED ABDOMINAL LOCATION: Primary | ICD-10-CM

## 2022-11-09 DIAGNOSIS — R10.9 ABDOMINAL PAIN, UNSPECIFIED ABDOMINAL LOCATION: ICD-10-CM

## 2022-11-09 LAB
ALBUMIN SERPL BCP-MCNC: 4.2 G/DL (ref 3.5–5.2)
ALP SERPL-CCNC: 58 U/L (ref 55–135)
ALT SERPL W/O P-5'-P-CCNC: 13 U/L (ref 10–44)
ANION GAP SERPL CALC-SCNC: 9 MMOL/L (ref 8–16)
AST SERPL-CCNC: 13 U/L (ref 10–40)
BASOPHILS # BLD AUTO: 0.05 K/UL (ref 0–0.2)
BASOPHILS NFR BLD: 0.6 % (ref 0–1.9)
BILIRUB SERPL-MCNC: 0.3 MG/DL (ref 0.1–1)
BUN SERPL-MCNC: 30 MG/DL (ref 8–23)
CALCIUM SERPL-MCNC: 9.7 MG/DL (ref 8.7–10.5)
CHLORIDE SERPL-SCNC: 108 MMOL/L (ref 95–110)
CO2 SERPL-SCNC: 27 MMOL/L (ref 23–29)
COMPLEXED PSA SERPL-MCNC: 2.3 NG/ML (ref 0–4)
CREAT SERPL-MCNC: 1.4 MG/DL (ref 0.5–1.4)
DIFFERENTIAL METHOD: ABNORMAL
EOSINOPHIL # BLD AUTO: 0.3 K/UL (ref 0–0.5)
EOSINOPHIL NFR BLD: 3.5 % (ref 0–8)
ERYTHROCYTE [DISTWIDTH] IN BLOOD BY AUTOMATED COUNT: 13.3 % (ref 11.5–14.5)
EST. GFR  (NO RACE VARIABLE): 55.1 ML/MIN/1.73 M^2
GLUCOSE SERPL-MCNC: 87 MG/DL (ref 70–110)
HCT VFR BLD AUTO: 49.7 % (ref 40–54)
HGB BLD-MCNC: 15.7 G/DL (ref 14–18)
IMM GRANULOCYTES # BLD AUTO: 0.02 K/UL (ref 0–0.04)
IMM GRANULOCYTES NFR BLD AUTO: 0.2 % (ref 0–0.5)
LYMPHOCYTES # BLD AUTO: 2.5 K/UL (ref 1–4.8)
LYMPHOCYTES NFR BLD: 29.1 % (ref 18–48)
MCH RBC QN AUTO: 27.5 PG (ref 27–31)
MCHC RBC AUTO-ENTMCNC: 31.6 G/DL (ref 32–36)
MCV RBC AUTO: 87 FL (ref 82–98)
MONOCYTES # BLD AUTO: 0.9 K/UL (ref 0.3–1)
MONOCYTES NFR BLD: 10.6 % (ref 4–15)
NEUTROPHILS # BLD AUTO: 4.8 K/UL (ref 1.8–7.7)
NEUTROPHILS NFR BLD: 56 % (ref 38–73)
NRBC BLD-RTO: 0 /100 WBC
PLATELET # BLD AUTO: 199 K/UL (ref 150–450)
PMV BLD AUTO: 9.2 FL (ref 9.2–12.9)
POTASSIUM SERPL-SCNC: 4.8 MMOL/L (ref 3.5–5.1)
PROT SERPL-MCNC: 7.2 G/DL (ref 6–8.4)
RBC # BLD AUTO: 5.71 M/UL (ref 4.6–6.2)
SODIUM SERPL-SCNC: 144 MMOL/L (ref 136–145)
WBC # BLD AUTO: 8.53 K/UL (ref 3.9–12.7)

## 2022-11-09 PROCEDURE — 99204 OFFICE O/P NEW MOD 45 MIN: CPT | Mod: S$PBB,,, | Performed by: STUDENT IN AN ORGANIZED HEALTH CARE EDUCATION/TRAINING PROGRAM

## 2022-11-09 PROCEDURE — 85025 COMPLETE CBC W/AUTO DIFF WBC: CPT | Performed by: STUDENT IN AN ORGANIZED HEALTH CARE EDUCATION/TRAINING PROGRAM

## 2022-11-09 PROCEDURE — 36415 COLL VENOUS BLD VENIPUNCTURE: CPT | Performed by: STUDENT IN AN ORGANIZED HEALTH CARE EDUCATION/TRAINING PROGRAM

## 2022-11-09 PROCEDURE — 99999 PR PBB SHADOW E&M-EST. PATIENT-LVL IV: ICD-10-PCS | Mod: PBBFAC,,, | Performed by: STUDENT IN AN ORGANIZED HEALTH CARE EDUCATION/TRAINING PROGRAM

## 2022-11-09 PROCEDURE — 86677 HELICOBACTER PYLORI ANTIBODY: CPT | Performed by: STUDENT IN AN ORGANIZED HEALTH CARE EDUCATION/TRAINING PROGRAM

## 2022-11-09 PROCEDURE — 99999 PR PBB SHADOW E&M-EST. PATIENT-LVL IV: CPT | Mod: PBBFAC,,, | Performed by: STUDENT IN AN ORGANIZED HEALTH CARE EDUCATION/TRAINING PROGRAM

## 2022-11-09 PROCEDURE — 80053 COMPREHEN METABOLIC PANEL: CPT | Performed by: STUDENT IN AN ORGANIZED HEALTH CARE EDUCATION/TRAINING PROGRAM

## 2022-11-09 PROCEDURE — 99204 PR OFFICE/OUTPT VISIT, NEW, LEVL IV, 45-59 MIN: ICD-10-PCS | Mod: S$PBB,,, | Performed by: STUDENT IN AN ORGANIZED HEALTH CARE EDUCATION/TRAINING PROGRAM

## 2022-11-09 PROCEDURE — 84153 ASSAY OF PSA TOTAL: CPT | Performed by: INTERNAL MEDICINE

## 2022-11-09 PROCEDURE — 99214 OFFICE O/P EST MOD 30 MIN: CPT | Mod: PBBFAC | Performed by: STUDENT IN AN ORGANIZED HEALTH CARE EDUCATION/TRAINING PROGRAM

## 2022-11-09 NOTE — TELEPHONE ENCOUNTER
----- Message from Zeynep Walsh sent at 11/9/2022  9:35 AM CST -----  Regarding: Appointment  Contact: [phone]  Calling to schedule an appointment with any of the providers requested per referral for abdominal pains. Please call to schedule.

## 2022-11-09 NOTE — PROGRESS NOTES
[facility] Consultation Note    Reason for Consult:  The primary encounter diagnosis was Abdominal pain, unspecified abdominal location. A diagnosis of Calculus of gallbladder without cholecystitis without obstruction was also pertinent to this visit.    PCP:   Zhang Denise   [address] [facility] / [place] [id number]    Referring MD:  Zhang Denise Md  [address] [facility]  [place] [address]    HPI:  This is a 66 y.o. male here for evaluation of abdominal pain.    He notes that a few years ago he started to have a throbbing pain in his RUQ.  The pain would come and go, which he initially attributed to MSK pain.  He discussed this with his PCP who scheduled an ultrasound which was unremarkable.  Ultrasound was done in 2019 and pain persisted, so he was referred to GI clinic.     He notes that his abdominal pain occurs about 4 times a week.  It can last for 30-60 minutes.  The pain is fairly mild and he does not require any medications for it.  Usually resolves on its own, but lately he feels it more persistently.  He has not been able to correlate it with food, exercise, or movement.  Denies any nausea, diarrhea, constipation, or diarrhea.     Prior tobacco use, does drink alcohol - few times a month.  Retired .      No prior EGD.    ROS:  Constitutional: No fevers, chills, No weight loss  ENT: No allergies  CV: No chest pain  Pulm: No cough, No shortness of breath  Ophtho: No vision changes  GI: see HPI  Derm: No rash  Heme: No lymphadenopathy, No bruising  MSK: No arthritis  : No dysuria, No hematuria  Endo: No hot or cold intolerance  Neuro: No syncope, No seizure  Psych: No anxiety, No depression    Medical History:  has a past medical history of Ankle fracture, Cataract, Essential hypertension, HLD (hyperlipidemia), and Nuclear sclerosis - Both Eyes (04/16/2013).    Surgical History:  has a past surgical history that includes Back surgery; Fracture surgery;  "right ankle; and Colonoscopy (N/A, 12/11/2018).    Family History: family history includes Glaucoma in his mother; Heart disease in his maternal grandfather, maternal grandmother, and mother; Hyperlipidemia in his brother; Hypertension in his brother; No Known Problems in his father, maternal aunt, maternal uncle, paternal aunt, paternal grandfather, paternal grandmother, paternal uncle, and sister; Stroke in his mother..     Social History:  reports that he quit smoking about 2 years ago. His smoking use included cigarettes. He has a 3.75 pack-year smoking history. He has never used smokeless tobacco. He reports current alcohol use. He reports that he does not use drugs.    Review of patient's allergies indicates:   Allergen Reactions    Pcn [penicillins] Anaphylaxis    Candesartan      Itching - drug rash       Current Outpatient Rx   Medication Sig Dispense Refill    amLODIPine (NORVASC) 10 MG tablet TAKE 1 TABLET BY MOUTH EVERY DAY 30 tablet 11    aspirin (ECOTRIN) 81 MG EC tablet Take 81 mg by mouth once daily.      atorvastatin (LIPITOR) 20 MG tablet TAKE 1 TABLET BY MOUTH EVERY DAY 90 tablet 2    citalopram (CELEXA) 40 MG tablet Take 1 tablet (40 mg total) by mouth once daily. 90 tablet 2    triamcinolone acetonide 0.1% (KENALOG) 0.1 % cream AAA bid for 2-4 weeks per course. 454 g 0    erythromycin (ROMYCIN) ophthalmic ointment Place a 1/2 inch ribbon of ointment into the lower eyelid, at bedtime 1 g 0    HYDROcodone-acetaminophen (NORCO) 5-325 mg per tablet Take 1 tablet by mouth every 6 (six) hours as needed for Pain. 12 tablet 0    moxifloxacin (VIGAMOX) 0.5 % ophthalmic solution Place 1 drop into the left eye 3 (three) times daily. 3 mL 0       Objective Findings:    Vital Signs:  BP (!) 144/91 (BP Location: Left arm, Patient Position: Sitting, BP Method: Medium (Automatic))   Pulse 77   Ht 5' 9" (1.753 m)   Wt 95 kg (209 lb 7 oz)   BMI 30.93 kg/m²   Body mass index is 30.93 kg/m².    Physical " Exam:  General Appearance: Well appearing in no acute distress  Head:   Normocephalic, without obvious abnormality  Eyes:    No scleral icterus, EOMI  ENT: Neck supple, Lips, mucosa, and tongue normal; teeth and gums normal  Lungs: CTA bilaterally in anterior and posterior fields, no wheezes, no crackles.  Heart:  Regular rate and rhythm, S1, S2 normal, no murmurs heard  Abdomen: Soft, non tender, non distended with positive bowel sounds in all four quadrants. No hepatosplenomegaly, ascites, or mass, +midline hernia  Extremities: 2+ pulses, no clubbing, cyanosis or edema  Skin: No rash  Neurologic: CN II-XII intact      Labs:  Lab Results   Component Value Date    WBC 13.68 (H) 03/05/2022    HGB 15.7 03/05/2022    HCT 47.8 03/05/2022     (L) 03/05/2022    CHOL 137 03/05/2021    TRIG 91 03/05/2021    HDL 37 (L) 03/05/2021    ALT 19 03/05/2022    AST 19 03/05/2022     03/05/2022    K 4.1 03/05/2022     03/05/2022    CREATININE 1.2 03/05/2022    BUN 22 03/05/2022    CO2 21 (L) 03/05/2022    TSH 1.287 03/05/2021    PSA 2.8 03/05/2021    HGBA1C 5.4 03/05/2021       Imaging:  Ultrasound 12/5/19  Impression:     Aortic ectasia.  No evidence of aneurysmal dilatation.     Hepatic steatosis.     Cholelithiasis without evidence of cholecystitis.     Simple right renal cyst.       Endoscopy:    Colonoscopy 12/11/2018  Findings:        The perianal and digital rectal examinations were normal.        The colon (entire examined portion) appeared normal. No biopsies or        other specimens were collected for this exam.        The terminal ileum appeared normal.     Assessment:  1. Abdominal pain, unspecified abdominal location    2. Calculus of gallbladder without cholecystitis without obstruction      In brief, the patient is a 67yo man who presents with chronic abdominal pain.    The patient has a 3 year history of intermittent RUQ pain.  Prior ultrasound was notable for small gallstones, which may be cause  of intermittent pain.  Also have considered GERD, PUD, and IBS.  Have recommended we obtain CBC, CMP, and H. Pylori.  Also recommended RUQ ultrasound is repeated, but patient elected to defer given little impact on overall wellbeing.      I did note thrombocytopenia on CBC and steatosis noted on prior ultrasound.  Will repeat CBC and if persistently low platelets, will raise topic of abdominal imaging again.    Follow up if symptoms worsen or fail to improve.      Order summary:  Orders Placed This Encounter    CBC Auto Differential    Comprehensive Metabolic Panel    H. pylori Antibody, IgG         Thank you so much for allowing me to participate in the care of Anthony Garcia MD

## 2022-11-09 NOTE — TELEPHONE ENCOUNTER
Spoke with patient.  Aware , , and  first available appointment times will be after the first of the year.   Patient does have scheduled an appointment for this afternoon with .  Patient stated he will keep that appointment.   Simin

## 2022-11-11 LAB — H PYLORI IGG SERPL QL IA: ABNORMAL

## 2022-11-14 ENCOUNTER — PATIENT MESSAGE (OUTPATIENT)
Dept: GASTROENTEROLOGY | Facility: CLINIC | Age: 67
End: 2022-11-14
Payer: MEDICARE

## 2022-11-14 RX ORDER — METRONIDAZOLE 500 MG/1
500 TABLET ORAL 3 TIMES DAILY
Qty: 42 TABLET | Refills: 0 | Status: SHIPPED | OUTPATIENT
Start: 2022-11-14 | End: 2022-11-28

## 2022-11-14 RX ORDER — OMEPRAZOLE 40 MG/1
40 CAPSULE, DELAYED RELEASE ORAL 2 TIMES DAILY
Qty: 28 CAPSULE | Refills: 0 | Status: SHIPPED | OUTPATIENT
Start: 2022-11-14 | End: 2022-11-28

## 2022-11-14 RX ORDER — BISMUTH SUBSALICYLATE 262 MG/1
1 TABLET ORAL 4 TIMES DAILY
Qty: 56 TABLET | Refills: 0 | Status: SHIPPED | OUTPATIENT
Start: 2022-11-14 | End: 2022-11-28

## 2022-11-14 RX ORDER — DOXYCYCLINE 100 MG/1
100 CAPSULE ORAL EVERY 12 HOURS
Qty: 28 CAPSULE | Refills: 0 | Status: SHIPPED | OUTPATIENT
Start: 2022-11-14 | End: 2022-11-28

## 2022-11-15 ENCOUNTER — PATIENT MESSAGE (OUTPATIENT)
Dept: GASTROENTEROLOGY | Facility: CLINIC | Age: 67
End: 2022-11-15
Payer: MEDICARE

## 2023-03-23 DIAGNOSIS — Z72.0 TOBACCO ABUSE: Primary | Chronic | ICD-10-CM

## 2023-03-23 DIAGNOSIS — Z87.891 HISTORY OF TOBACCO USE: ICD-10-CM

## 2023-03-28 ENCOUNTER — TELEPHONE (OUTPATIENT)
Dept: PULMONOLOGY | Facility: CLINIC | Age: 68
End: 2023-03-28
Payer: MEDICARE

## 2023-03-31 ENCOUNTER — PATIENT MESSAGE (OUTPATIENT)
Dept: INTERNAL MEDICINE | Facility: CLINIC | Age: 68
End: 2023-03-31
Payer: MEDICARE

## 2023-04-27 ENCOUNTER — TELEPHONE (OUTPATIENT)
Dept: PULMONOLOGY | Facility: CLINIC | Age: 68
End: 2023-04-27
Payer: MEDICARE

## 2023-04-28 NOTE — PROGRESS NOTES
"OUTPATIENT PHYSICAL THERAPY  PHYSICAL THERAPY EVALUATION    Name: Anthony Hanna  [facility] Number: [id number]    Diagnosis:   Encounter Diagnoses   Name Primary?    Closed bimalleolar fracture of right ankle, initial encounter Yes    Acute right ankle pain     Decreased ROM of ankle     Ankle weakness       Physician: Anne Ellison PA-C  Treatment Orders: PT Eval and Treat  Past Medical History:   Diagnosis Date    HLD (hyperlipidemia)     Nuclear sclerosis - Both Eyes 04/16/2013    patient states he is not familiar with this diagnosis      Current Outpatient Prescriptions   Medication Sig    atorvastatin (LIPITOR) 20 MG tablet Take 20 mg by mouth once daily.    citalopram (CELEXA) 40 MG tablet     docusate sodium (COLACE) 100 MG capsule Take 1 capsule (100 mg total) by mouth 2 (two) times daily. Available OTC as well    hydrocodone-acetaminophen 7.5-325mg (NORCO) 7.5-325 mg per tablet     ondansetron (ZOFRAN) 8 MG tablet Take 1 tablet (8 mg total) by mouth every 12 (twelve) hours as needed for Nausea.    oxycodone-acetaminophen (PERCOCET)  mg per tablet Take 1 tablet by mouth every 4 (four) hours as needed for Pain.     No current facility-administered medications for this visit.      Review of patient's allergies indicates:   Allergen Reactions    Pcn [penicillins] Anaphylaxis       Time in: 9:00 AM  Time Out: 10:00 AM   Total Treatment Time: 60 minutes    Date of eval: 11/2/2017  Visit #: 1/20  Auth expiration: 12/31/17  POC expiration: 2/28/18     Precautions: standard    Subjective   History of Present Illness: pt reports falling off a ladder labor day weekend and then after 10 days he returned for an ORIF surgery. Pt reports using crutches until last Friday, when he was given the "OK" to put weight on it. Pt reports having a different pain which feels more like a throbbing, pt reports tenderness in ankle. Pt reports taking more pain pills not then after the surgery. Pt reports having   DOI: " "trimalleolar ORIF sx 9/13  Onset: sudden  Patient c/o: continuous symptoms  Pain Scale: Anthony rates pain on a scale of 0-10 to be 6 at worst; 4 currently; 0 at best .  Aggravating factors: moving, walking  Relieving factors: resting, pain medication    Previous treatment: none  Imaging: X-ray (pre and post sx)  Past surgical history: 20 yrs ago had discectomy    Prior level of function: none  Functional deficits: unable to drive far distances, unable to walk > 10 ft without pain, sit > stand  Occupation: reitred, work duties include: garden  Environment: two story home with 5 steps to enter, has handrail    No cultural or spiritual barriers identified to treatment or learning.  Patient's goals: The patients goal is to return to PLOF without pain or risk of re-injury.      Objective   Mental status: oriented x3  Posture/ Alignment: Fair, uses BUE to support trunk while seated    GAIT DEVIATIONS: Anthony amb with decreased sharmaine, decreased step length, decreased toe-to-floor clearance and decreased weight-shifting ability.    FUNCTION:   - Sit <--> Stand: demonstrates preference for L LE wbing, uses B UE support for transfer      ROM:      PROM Right Left Comment Norms   DF: 10 degrees * 20 degrees  20 deg   PF: 25 degrees * 50 degrees  50 deg   Eversion: 5 degrees 25 degrees  15 deg   Inversion: 20 degrees 45 degrees  35 deg   1st MTP Ext: 125 degrees 65 degrees  80 deg   1st MTP Flex: -15 degrees 45 degrees  45 deg   *denotes pain      Strength:      Right Left Comment   DF: 4-/5 5/5 Within available range   PF: 4-/5 5/5 "   Eversion: 4-/5 5/5 "   Inversion: 4-/5 5/5 "   1st MTP Ext: 4+/5 5/5 "   1st MTP Flex: 4+/5 5/5 "         Special Tests:   Figure 8: L 64.5cm, R 57cm    Palpation: warmth and tenderness to the touch at medial and lateral malleoulus and into achilles tendon, no palpable pedis pulses    Joint Play: decreased movement at talo-crural and talo-calcaneal joints with AP glides and inv/ev    Pt/family " was provided educational information, including: role of PT, goals for PT, scheduling - pt verbalized understanding. Discussed insurance plan with pt.     Assessment   Anthony is a 61 y.o. male referred to outpatient physical therapy with a medical diagnosis of R trimalleolar closed fracture s/p ORIF surgery. Demonstrates impairments including limitations as described in the problem list. Pt prognosis is Good. Positive prognostic factors include motivation to return to PLOF. Negative prognostic factors include advanced age, chronicity of pain and degree of current swelling. Pt will benefit from skilled outpatient physical therapy to address the above stated deficits, provide pt/family education, and to maximize pt's level of independence.     History  Co-morbidities and personal factors that may impact the plan of care Examination  Body Structures and Functions, activity limitations and participation restrictions that may impact the plan of care    Clinical Presentation   Co-morbidities:   none noted        Personal Factors:   coping style  lifestyle Body Regions:   lower extremities    Body Systems:   gross symmetry  ROM  strength  balance  gait  motor control        Participation Restrictions:   Unable to walk or stand for prolonged periods     Activity limitations:   Mobility  walking  driving (bike, car, motorcycle)    Self care  no deficits    Domestic Life  no deficits    Community and Social Life  community life  recreation and leisure         stable and uncomplicated                      low   low  moderate Decision Making/ Complexity Score:  low     Pt's spiritual, cultural and educational needs considered and pt agreeable to plan of care and goals as stated below:     Anticipated Barriers for physical therapy: none noted    GOALS:  Short Term GOALS:  In 4 weeks, pt. will:  Pt will report decrease in pain </= 3 at worst in order to tolerate driving.  Pt will demonstrate >/= 25%  increase in R ankle ROM to  allow for improved gait pattern with ambulation.  Pt will demonstrate increased gastroc strength in R LE by 1 MMT grade to increase tolerance to stair navigation.  Pt will demonstrate improved balance with SLS to 15 sec to signify improved intrinsic foot mm endurance.     Long Term GOALS:  In 8 weeks, pt. Will:  Pt will report no pain in order to return to gardening and yard work.  Pt will demonstrate increase in B ankle ROM to WFL in order to allow for improved gait pattern with ambulation.  Pt will demonstrate increased gastroc strength in B LE to 4+/5 MMT grade to increase tolerance to ambulation.  Pt will demonstrate improved balance with SLS to 30 sec to signify improved intrinsic foot mm endurance and decrease fall risk.  Pt will be independent with HEP and SX management         Plan   Outpatient physical therapy 1- 2 times weekly to include: pt ed, HEP, therapeutic exercises, therapeutic activities, neuromuscular re-education/ balance exercises, manual therapy, and modalities prn. Cont PT for 3-4 months. Pt may be seen by PTA as part of the rehabilitation team.     I certify the need for these services furnished under this plan of treatment and while under my care.    Keenan Barnett, PT   no

## 2023-05-02 ENCOUNTER — OFFICE VISIT (OUTPATIENT)
Dept: INTERNAL MEDICINE | Facility: CLINIC | Age: 68
End: 2023-05-02
Payer: MEDICARE

## 2023-05-02 ENCOUNTER — LAB VISIT (OUTPATIENT)
Dept: LAB | Facility: HOSPITAL | Age: 68
End: 2023-05-02
Attending: INTERNAL MEDICINE
Payer: MEDICARE

## 2023-05-02 VITALS
BODY MASS INDEX: 29.16 KG/M2 | DIASTOLIC BLOOD PRESSURE: 80 MMHG | SYSTOLIC BLOOD PRESSURE: 136 MMHG | OXYGEN SATURATION: 95 % | WEIGHT: 196.88 LBS | HEIGHT: 69 IN | HEART RATE: 81 BPM | TEMPERATURE: 98 F

## 2023-05-02 DIAGNOSIS — I10 ESSENTIAL HYPERTENSION: Primary | Chronic | ICD-10-CM

## 2023-05-02 DIAGNOSIS — A04.8 H. PYLORI INFECTION: ICD-10-CM

## 2023-05-02 DIAGNOSIS — F32.5 MAJOR DEPRESSIVE DISORDER WITH SINGLE EPISODE, IN FULL REMISSION: Chronic | ICD-10-CM

## 2023-05-02 DIAGNOSIS — Z12.5 PROSTATE CANCER SCREENING: ICD-10-CM

## 2023-05-02 DIAGNOSIS — E78.49 OTHER HYPERLIPIDEMIA: Chronic | ICD-10-CM

## 2023-05-02 DIAGNOSIS — I70.0 AORTIC ATHEROSCLEROSIS: ICD-10-CM

## 2023-05-02 DIAGNOSIS — Z00.00 ANNUAL PHYSICAL EXAM: ICD-10-CM

## 2023-05-02 DIAGNOSIS — I10 ESSENTIAL HYPERTENSION: Chronic | ICD-10-CM

## 2023-05-02 DIAGNOSIS — J43.9 PULMONARY EMPHYSEMA, UNSPECIFIED EMPHYSEMA TYPE: ICD-10-CM

## 2023-05-02 LAB
ALBUMIN SERPL BCP-MCNC: 4.2 G/DL (ref 3.5–5.2)
ALP SERPL-CCNC: 68 U/L (ref 55–135)
ALT SERPL W/O P-5'-P-CCNC: 16 U/L (ref 10–44)
ANION GAP SERPL CALC-SCNC: 7 MMOL/L (ref 8–16)
AST SERPL-CCNC: 13 U/L (ref 10–40)
BASOPHILS # BLD AUTO: 0.05 K/UL (ref 0–0.2)
BASOPHILS NFR BLD: 0.6 % (ref 0–1.9)
BILIRUB SERPL-MCNC: 0.6 MG/DL (ref 0.1–1)
BUN SERPL-MCNC: 25 MG/DL (ref 8–23)
CALCIUM SERPL-MCNC: 10.1 MG/DL (ref 8.7–10.5)
CHLORIDE SERPL-SCNC: 105 MMOL/L (ref 95–110)
CHOLEST SERPL-MCNC: 150 MG/DL (ref 120–199)
CHOLEST/HDLC SERPL: 3.6 {RATIO} (ref 2–5)
CO2 SERPL-SCNC: 27 MMOL/L (ref 23–29)
CREAT SERPL-MCNC: 1.2 MG/DL (ref 0.5–1.4)
DIFFERENTIAL METHOD: ABNORMAL
EOSINOPHIL # BLD AUTO: 0.3 K/UL (ref 0–0.5)
EOSINOPHIL NFR BLD: 3 % (ref 0–8)
ERYTHROCYTE [DISTWIDTH] IN BLOOD BY AUTOMATED COUNT: 13.9 % (ref 11.5–14.5)
EST. GFR  (NO RACE VARIABLE): >60 ML/MIN/1.73 M^2
GLUCOSE SERPL-MCNC: 89 MG/DL (ref 70–110)
HCT VFR BLD AUTO: 53 % (ref 40–54)
HDLC SERPL-MCNC: 42 MG/DL (ref 40–75)
HDLC SERPL: 28 % (ref 20–50)
HGB BLD-MCNC: 16.3 G/DL (ref 14–18)
IMM GRANULOCYTES # BLD AUTO: 0.03 K/UL (ref 0–0.04)
IMM GRANULOCYTES NFR BLD AUTO: 0.4 % (ref 0–0.5)
LDLC SERPL CALC-MCNC: 85.4 MG/DL (ref 63–159)
LYMPHOCYTES # BLD AUTO: 1.7 K/UL (ref 1–4.8)
LYMPHOCYTES NFR BLD: 20.5 % (ref 18–48)
MCH RBC QN AUTO: 27.6 PG (ref 27–31)
MCHC RBC AUTO-ENTMCNC: 30.8 G/DL (ref 32–36)
MCV RBC AUTO: 90 FL (ref 82–98)
MONOCYTES # BLD AUTO: 0.7 K/UL (ref 0.3–1)
MONOCYTES NFR BLD: 8.6 % (ref 4–15)
NEUTROPHILS # BLD AUTO: 5.7 K/UL (ref 1.8–7.7)
NEUTROPHILS NFR BLD: 66.9 % (ref 38–73)
NONHDLC SERPL-MCNC: 108 MG/DL
NRBC BLD-RTO: 0 /100 WBC
PLATELET # BLD AUTO: 233 K/UL (ref 150–450)
PMV BLD AUTO: 9.4 FL (ref 9.2–12.9)
POTASSIUM SERPL-SCNC: 5.1 MMOL/L (ref 3.5–5.1)
PROT SERPL-MCNC: 7.5 G/DL (ref 6–8.4)
RBC # BLD AUTO: 5.9 M/UL (ref 4.6–6.2)
SODIUM SERPL-SCNC: 139 MMOL/L (ref 136–145)
TRIGL SERPL-MCNC: 113 MG/DL (ref 30–150)
TSH SERPL DL<=0.005 MIU/L-ACNC: 1.88 UIU/ML (ref 0.4–4)
WBC # BLD AUTO: 8.47 K/UL (ref 3.9–12.7)

## 2023-05-02 PROCEDURE — 84153 ASSAY OF PSA TOTAL: CPT | Performed by: INTERNAL MEDICINE

## 2023-05-02 PROCEDURE — 99214 OFFICE O/P EST MOD 30 MIN: CPT | Mod: S$PBB,,, | Performed by: INTERNAL MEDICINE

## 2023-05-02 PROCEDURE — 80061 LIPID PANEL: CPT | Performed by: INTERNAL MEDICINE

## 2023-05-02 PROCEDURE — 36415 COLL VENOUS BLD VENIPUNCTURE: CPT | Mod: PO | Performed by: INTERNAL MEDICINE

## 2023-05-02 PROCEDURE — 99214 PR OFFICE/OUTPT VISIT, EST, LEVL IV, 30-39 MIN: ICD-10-PCS | Mod: S$PBB,,, | Performed by: INTERNAL MEDICINE

## 2023-05-02 PROCEDURE — 80053 COMPREHEN METABOLIC PANEL: CPT | Performed by: INTERNAL MEDICINE

## 2023-05-02 PROCEDURE — 85025 COMPLETE CBC W/AUTO DIFF WBC: CPT | Performed by: INTERNAL MEDICINE

## 2023-05-02 PROCEDURE — 99999 PR PBB SHADOW E&M-EST. PATIENT-LVL IV: ICD-10-PCS | Mod: PBBFAC,,, | Performed by: INTERNAL MEDICINE

## 2023-05-02 PROCEDURE — 99999 PR PBB SHADOW E&M-EST. PATIENT-LVL IV: CPT | Mod: PBBFAC,,, | Performed by: INTERNAL MEDICINE

## 2023-05-02 PROCEDURE — 84443 ASSAY THYROID STIM HORMONE: CPT | Performed by: INTERNAL MEDICINE

## 2023-05-02 PROCEDURE — 99214 OFFICE O/P EST MOD 30 MIN: CPT | Mod: PBBFAC,PO | Performed by: INTERNAL MEDICINE

## 2023-05-02 RX ORDER — VARICELLA-ZOSTER GE VAC,2 OF 2 50 MCG
1 VIAL (EA) INTRAMUSCULAR ONCE
Qty: 1 EACH | Refills: 1 | Status: SHIPPED | OUTPATIENT
Start: 2023-05-02 | End: 2023-05-02

## 2023-05-02 NOTE — PROGRESS NOTES
Subjective:       Patient ID: Anthony Hanna is a 67 y.o. male.    Chief Complaint: Follow-up    HPI    67 y.o. male here for follow-up of chronic medical conditions.     HTN - Patient is currently on Norvasc 10 mg. He does not check his BP at home. Side effects of medications note: none. Denies headaches, blurred vision, chest pain, shortness of breath, nausea.    HLD - Patient is currently on lipitor 20 mg.  His last lipid panel was   Cholesterol   Date Value Ref Range Status   03/05/2021 137 120 - 199 mg/dL Final     Comment:     The National Cholesterol Education Program (NCEP) has set the  following guidelines (reference ranges) for Cholesterol:  Optimal.....................<200 mg/dL  Borderline High.............200-239 mg/dL  High........................> or = 240 mg/dL       Triglycerides   Date Value Ref Range Status   03/05/2021 91 30 - 150 mg/dL Final     Comment:     The National Cholesterol Education Program (NCEP) has set the  following guidelines (reference values) for triglycerides:  Normal......................<150 mg/dL  Borderline High.............150-199 mg/dL  High........................200-499 mg/dL       HDL   Date Value Ref Range Status   03/05/2021 37 (L) 40 - 75 mg/dL Final     Comment:     The National Cholesterol Education Program (NCEP) has set the  following guidelines (reference values) for HDL Cholesterol:  Low...............<40 mg/dL  Optimal...........>60 mg/dL       LDL Cholesterol   Date Value Ref Range Status   03/05/2021 81.8 63.0 - 159.0 mg/dL Final     Comment:     The National Cholesterol Education Program (NCEP) has set the  following guidelines (reference values) for LDL Cholesterol:  Optimal.......................<130 mg/dL  Borderline High...............130-159 mg/dL  High..........................160-189 mg/dL  Very High.....................>190 mg/dL     .  Side effects of the medication: none.    Patient has depression and is on Celexa 40 mg.    Cholesterol:  needs  Vaccines: Influenza - ; Tetanus - 2017; Prevnar 20 - ; Zoster - not done; COVID - 5 done  Sexual Screening: active  STD screening: no concern  Eye exam: done last 1-2 yrs ago  Prostate: needs  Colonoscopy: , due     Exercise: walking 2 miles a day.  Diet: home cooked    Past Medical History:   Diagnosis Date    Ankle fracture     Cataract     Essential hypertension     HLD (hyperlipidemia)     Nuclear sclerosis - Both Eyes 2013    patient states he is not familiar with this diagnosis      Past Surgical History:   Procedure Laterality Date    BACK SURGERY      COLONOSCOPY N/A 2018    Procedure: COLONOSCOPY;  Surgeon: PUMA Fernandez MD;  Location: [address]);  Service: Endoscopy;  Laterality: N/A;    FRACTURE SURGERY      right ankle      SPINE SURGERY       Social History     Socioeconomic History    Marital status:    Tobacco Use    Smoking status: Former     Packs/day: 0.50     Years: 15.00     Pack years: 7.50     Types: Cigarettes     Quit date: 10/31/2020     Years since quittin.5     Passive exposure: Never    Smokeless tobacco: Never    Tobacco comments:     In Smoking cessation program   Substance and Sexual Activity    Alcohol use: Yes     Comment: couple beers a week or less    Drug use: No    Sexual activity: Yes     Partners: Female     Birth control/protection: None     Comment:      Review of patient's allergies indicates:   Allergen Reactions    Pcn [penicillins] Anaphylaxis    Candesartan      Itching - drug rash     Anthony [facility] had no medications administered during this visit.    Review of Systems      Objective:      Physical Exam  Vitals reviewed.   Constitutional:       Appearance: He is well-developed.   HENT:      Head: Normocephalic and atraumatic.      Mouth/Throat:      Pharynx: No oropharyngeal exudate.   Eyes:      General: No scleral icterus.        Right eye: No discharge.         Left eye: No discharge.      Pupils:  Pupils are equal, round, and reactive to light.   Neck:      Thyroid: No thyromegaly.      Trachea: No tracheal deviation.   Cardiovascular:      Rate and Rhythm: Normal rate and regular rhythm.      Heart sounds: Normal heart sounds. No murmur heard.    No friction rub. No gallop.   Pulmonary:      Effort: Pulmonary effort is normal. No respiratory distress.      Breath sounds: Normal breath sounds. No wheezing or rales.   Chest:      Chest wall: No tenderness.   Abdominal:      General: Bowel sounds are normal. There is no distension.      Palpations: Abdomen is soft. There is no mass.      Tenderness: There is no abdominal tenderness. There is no guarding or rebound.   Musculoskeletal:         General: No tenderness. Normal range of motion.      Cervical back: Normal range of motion and neck supple.   Skin:     General: Skin is warm and dry.      Coloration: Skin is not pale.      Findings: No erythema or rash.   Neurological:      Mental Status: He is alert and oriented to person, place, and time.   Psychiatric:         Behavior: Behavior normal.       Assessment:       1. Essential hypertension  - CBC Auto Differential; Future  - Comprehensive Metabolic Panel; Future  - TSH; Future  - Lipid Panel; Future    2. Other hyperlipidemia    3. Major depressive disorder with single episode, in full remission    4. Annual physical exam    5. Prostate cancer screening  - PSA, Screening; Future    6. H. pylori infection    7. Pulmonary emphysema, unspecified emphysema type    8. Aortic atherosclerosis      Plan:       1. Continue norvasc 10 mg  2/8. [id number] lipitor 20 mg daily.  3. Continue celexa 40 mg  4. Check CBC, CMP, TSH, lipids, PSA.  Discussed diet and exercise.  Discussed vaccines.    5.  Check PSA.    6. Patient does not wish to pursue scope or further treatment for H pylori.  7.  Offered patient PFTs, albuterol inhaler.  Patient declines.

## 2023-05-03 LAB — COMPLEXED PSA SERPL-MCNC: 3 NG/ML (ref 0–4)

## 2023-05-09 ENCOUNTER — HOSPITAL ENCOUNTER (OUTPATIENT)
Dept: RADIOLOGY | Facility: HOSPITAL | Age: 68
Discharge: HOME | End: 2023-05-09
Attending: INTERNAL MEDICINE
Payer: MEDICARE

## 2023-05-09 DIAGNOSIS — Z72.0 TOBACCO ABUSE: ICD-10-CM

## 2023-05-09 DIAGNOSIS — Z87.891 HISTORY OF TOBACCO USE: ICD-10-CM

## 2023-05-09 PROCEDURE — 71271 CT CHEST LUNG SCREENING LOW DOSE: ICD-10-PCS | Mod: 26,GC,, | Performed by: RADIOLOGY

## 2023-05-09 PROCEDURE — 71271 CT THORAX LUNG CANCER SCR C-: CPT | Mod: 26,GC,, | Performed by: RADIOLOGY

## 2023-05-09 PROCEDURE — 71271 CT THORAX LUNG CANCER SCR C-: CPT | Mod: TC

## 2023-05-25 ENCOUNTER — PES CALL (OUTPATIENT)
Dept: ADMINISTRATIVE | Facility: CLINIC | Age: 68
End: 2023-05-25
Payer: MEDICARE

## 2023-06-26 RX ORDER — AMLODIPINE BESYLATE 10 MG/1
10 TABLET ORAL DAILY
Qty: 90 TABLET | Refills: 3 | Status: SHIPPED | OUTPATIENT
Start: 2023-06-26

## 2023-06-26 NOTE — TELEPHONE ENCOUNTER
Refill Decision Note      Refill Decision Note   Anthony Hanna  is requesting a refill authorization.  Brief Assessment and Rationale for Refill:  Approve     Medication Therapy Plan:         Comments:     Note composed:9:06 AM 06/26/2023             Appointments     Last Visit   5/2/2023 Zhang Denise MD   Next Visit   Visit date not found Zhang Denise MD

## 2023-06-26 NOTE — TELEPHONE ENCOUNTER
No care due was identified.  [facility] Embedded Care Due Messages. Reference number: [id number].   6/26/2023 6:37:29 AM CDT

## 2023-09-19 RX ORDER — ATORVASTATIN CALCIUM 20 MG/1
20 TABLET, FILM COATED ORAL DAILY
Qty: 90 TABLET | Refills: 2 | Status: SHIPPED | OUTPATIENT
Start: 2023-09-19

## 2023-09-19 RX ORDER — CITALOPRAM 40 MG/1
40 TABLET, FILM COATED ORAL DAILY
Qty: 90 TABLET | Refills: 2 | Status: SHIPPED | OUTPATIENT
Start: 2023-09-19

## 2023-09-19 NOTE — TELEPHONE ENCOUNTER
Refill Decision Note   Anthony Hanna  is requesting a refill authorization.  Brief Assessment and Rationale for Refill:  Approve     Medication Therapy Plan:         Alert overridden per protocol: Yes   Comments:     No Care Gaps recommended.     Note composed:12:49 PM 09/19/2023

## 2023-09-19 NOTE — TELEPHONE ENCOUNTER
No care due was identified.  [facility] Embedded Care Due Messages. Reference number: [id number].   9/19/2023 9:05:01 AM CDT

## 2024-05-10 DIAGNOSIS — I10 ESSENTIAL HYPERTENSION: ICD-10-CM

## 2024-06-15 RX ORDER — AMLODIPINE BESYLATE 10 MG/1
10 TABLET ORAL DAILY
Qty: 90 TABLET | Refills: 0 | Status: SHIPPED | OUTPATIENT
Start: 2024-06-15

## 2024-06-15 NOTE — TELEPHONE ENCOUNTER
Care Due:                  Date            Visit Type   Department     Provider  --------------------------------------------------------------------------------                                [facility]                              ANNUAL                              CHECKUP/PHY  MET INTERNAL  Last Visit: 05-      S            FARHAD Denise                              [facility]                              ANNUAL                              CHECKUP/PHY  [facility] INTERNAL  Next Visit: 06-      [facility]       Zhang Denise                                                            Last  Test          Frequency    Reason                     Performed    Due Date  --------------------------------------------------------------------------------    CMP.........  12 months..  atorvastatin.............  05- 04-    Lipid Panel.  12 months..  atorvastatin.............  05- 04-    [facility] Embedded Care Due Messages. Reference number: [id number].   6/15/2024 7:34:22 AM CDT

## 2024-06-24 ENCOUNTER — LAB VISIT (OUTPATIENT)
Dept: LAB | Facility: HOSPITAL | Age: 69
End: 2024-06-24
Attending: INTERNAL MEDICINE
Payer: MEDICARE

## 2024-06-24 DIAGNOSIS — I10 ESSENTIAL HYPERTENSION: ICD-10-CM

## 2024-06-24 LAB
ALBUMIN SERPL BCP-MCNC: 3.8 G/DL (ref 3.5–5.2)
ALP SERPL-CCNC: 54 U/L (ref 55–135)
ALT SERPL W/O P-5'-P-CCNC: 13 U/L (ref 10–44)
ANION GAP SERPL CALC-SCNC: 8 MMOL/L (ref 8–16)
AST SERPL-CCNC: 14 U/L (ref 10–40)
BILIRUB SERPL-MCNC: 0.5 MG/DL (ref 0.1–1)
BUN SERPL-MCNC: 28 MG/DL (ref 8–23)
CALCIUM SERPL-MCNC: 9.4 MG/DL (ref 8.7–10.5)
CHLORIDE SERPL-SCNC: 107 MMOL/L (ref 95–110)
CO2 SERPL-SCNC: 26 MMOL/L (ref 23–29)
CREAT SERPL-MCNC: 1.3 MG/DL (ref 0.5–1.4)
EST. GFR  (NO RACE VARIABLE): 59.5 ML/MIN/1.73 M^2
GLUCOSE SERPL-MCNC: 87 MG/DL (ref 70–110)
POTASSIUM SERPL-SCNC: 3.8 MMOL/L (ref 3.5–5.1)
PROT SERPL-MCNC: 6.7 G/DL (ref 6–8.4)
SODIUM SERPL-SCNC: 141 MMOL/L (ref 136–145)

## 2024-06-24 PROCEDURE — 36415 COLL VENOUS BLD VENIPUNCTURE: CPT | Mod: PO | Performed by: INTERNAL MEDICINE

## 2024-06-24 PROCEDURE — 80053 COMPREHEN METABOLIC PANEL: CPT | Performed by: INTERNAL MEDICINE

## 2024-06-25 ENCOUNTER — OFFICE VISIT (OUTPATIENT)
Dept: INTERNAL MEDICINE | Facility: CLINIC | Age: 69
End: 2024-06-25
Payer: MEDICARE

## 2024-06-25 ENCOUNTER — LAB VISIT (OUTPATIENT)
Dept: LAB | Facility: HOSPITAL | Age: 69
End: 2024-06-25
Attending: INTERNAL MEDICINE
Payer: MEDICARE

## 2024-06-25 VITALS
RESPIRATION RATE: 16 BRPM | OXYGEN SATURATION: 95 % | HEART RATE: 85 BPM | TEMPERATURE: 98 F | WEIGHT: 208 LBS | DIASTOLIC BLOOD PRESSURE: 90 MMHG | BODY MASS INDEX: 30.72 KG/M2 | SYSTOLIC BLOOD PRESSURE: 150 MMHG

## 2024-06-25 DIAGNOSIS — Z12.5 PROSTATE CANCER SCREENING: ICD-10-CM

## 2024-06-25 DIAGNOSIS — I70.0 AORTIC ATHEROSCLEROSIS: ICD-10-CM

## 2024-06-25 DIAGNOSIS — J43.9 PULMONARY EMPHYSEMA, UNSPECIFIED EMPHYSEMA TYPE: ICD-10-CM

## 2024-06-25 DIAGNOSIS — F32.5 MAJOR DEPRESSIVE DISORDER WITH SINGLE EPISODE, IN FULL REMISSION: Chronic | ICD-10-CM

## 2024-06-25 DIAGNOSIS — E78.49 OTHER HYPERLIPIDEMIA: Chronic | ICD-10-CM

## 2024-06-25 DIAGNOSIS — L98.9 DISEASE OF SKIN AND SUBCUTANEOUS TISSUE: ICD-10-CM

## 2024-06-25 DIAGNOSIS — I10 ESSENTIAL HYPERTENSION: Primary | Chronic | ICD-10-CM

## 2024-06-25 DIAGNOSIS — Z72.0 TOBACCO ABUSE: ICD-10-CM

## 2024-06-25 DIAGNOSIS — Z87.891 PERSONAL HISTORY OF NICOTINE DEPENDENCE: ICD-10-CM

## 2024-06-25 DIAGNOSIS — I10 ESSENTIAL HYPERTENSION: Chronic | ICD-10-CM

## 2024-06-25 LAB
BASOPHILS # BLD AUTO: 0.04 K/UL (ref 0–0.2)
BASOPHILS NFR BLD: 0.6 % (ref 0–1.9)
CHOLEST SERPL-MCNC: 145 MG/DL (ref 120–199)
CHOLEST/HDLC SERPL: 3.4 {RATIO} (ref 2–5)
COMPLEXED PSA SERPL-MCNC: 2.1 NG/ML (ref 0–4)
DIFFERENTIAL METHOD BLD: NORMAL
EOSINOPHIL # BLD AUTO: 0.2 K/UL (ref 0–0.5)
EOSINOPHIL NFR BLD: 2.5 % (ref 0–8)
ERYTHROCYTE [DISTWIDTH] IN BLOOD BY AUTOMATED COUNT: 14 % (ref 11.5–14.5)
HCT VFR BLD AUTO: 48.2 % (ref 40–54)
HDLC SERPL-MCNC: 43 MG/DL (ref 40–75)
HDLC SERPL: 29.7 % (ref 20–50)
HGB BLD-MCNC: 15.6 G/DL (ref 14–18)
IMM GRANULOCYTES # BLD AUTO: 0.01 K/UL (ref 0–0.04)
IMM GRANULOCYTES NFR BLD AUTO: 0.1 % (ref 0–0.5)
LDLC SERPL CALC-MCNC: 81.6 MG/DL (ref 63–159)
LYMPHOCYTES # BLD AUTO: 1.8 K/UL (ref 1–4.8)
LYMPHOCYTES NFR BLD: 26.9 % (ref 18–48)
MCH RBC QN AUTO: 28 PG (ref 27–31)
MCHC RBC AUTO-ENTMCNC: 32.4 G/DL (ref 32–36)
MCV RBC AUTO: 87 FL (ref 82–98)
MONOCYTES # BLD AUTO: 0.5 K/UL (ref 0.3–1)
MONOCYTES NFR BLD: 7.9 % (ref 4–15)
NEUTROPHILS # BLD AUTO: 4.1 K/UL (ref 1.8–7.7)
NEUTROPHILS NFR BLD: 62 % (ref 38–73)
NONHDLC SERPL-MCNC: 102 MG/DL
NRBC BLD-RTO: 0 /100 WBC
PLATELET # BLD AUTO: 180 K/UL (ref 150–450)
PMV BLD AUTO: 9.2 FL (ref 9.2–12.9)
RBC # BLD AUTO: 5.57 M/UL (ref 4.6–6.2)
TRIGL SERPL-MCNC: 102 MG/DL (ref 30–150)
TSH SERPL DL<=0.005 MIU/L-ACNC: 1.42 UIU/ML (ref 0.4–4)
WBC # BLD AUTO: 6.69 K/UL (ref 3.9–12.7)

## 2024-06-25 PROCEDURE — 99213 OFFICE O/P EST LOW 20 MIN: CPT | Mod: PBBFAC,PO | Performed by: INTERNAL MEDICINE

## 2024-06-25 PROCEDURE — 80061 LIPID PANEL: CPT | Performed by: INTERNAL MEDICINE

## 2024-06-25 PROCEDURE — 85025 COMPLETE CBC W/AUTO DIFF WBC: CPT | Performed by: INTERNAL MEDICINE

## 2024-06-25 PROCEDURE — 99999 PR PBB SHADOW E&M-EST. PATIENT-LVL III: CPT | Mod: PBBFAC,,, | Performed by: INTERNAL MEDICINE

## 2024-06-25 PROCEDURE — 84443 ASSAY THYROID STIM HORMONE: CPT | Performed by: INTERNAL MEDICINE

## 2024-06-25 PROCEDURE — 84153 ASSAY OF PSA TOTAL: CPT | Performed by: INTERNAL MEDICINE

## 2024-06-25 PROCEDURE — 36415 COLL VENOUS BLD VENIPUNCTURE: CPT | Mod: PO | Performed by: INTERNAL MEDICINE

## 2024-06-25 RX ORDER — AMLODIPINE BESYLATE 10 MG/1
10 TABLET ORAL DAILY
Qty: 90 TABLET | Refills: 3 | Status: SHIPPED | OUTPATIENT
Start: 2024-06-25

## 2024-06-25 RX ORDER — CITALOPRAM 40 MG/1
40 TABLET, FILM COATED ORAL DAILY
Qty: 90 TABLET | Refills: 3 | Status: SHIPPED | OUTPATIENT
Start: 2024-06-25

## 2024-06-25 RX ORDER — ATORVASTATIN CALCIUM 20 MG/1
20 TABLET, FILM COATED ORAL DAILY
Qty: 90 TABLET | Refills: 3 | Status: SHIPPED | OUTPATIENT
Start: 2024-06-25

## 2024-06-25 RX ORDER — HYDROCHLOROTHIAZIDE 25 MG/1
25 TABLET ORAL DAILY
Qty: 90 TABLET | Refills: 3 | Status: SHIPPED | OUTPATIENT
Start: 2024-06-25 | End: 2025-06-25

## 2024-06-25 RX ORDER — TRIAMCINOLONE ACETONIDE 1 MG/G
CREAM TOPICAL
Qty: 454 G | Refills: 0 | Status: SHIPPED | OUTPATIENT
Start: 2024-06-25

## 2024-06-25 RX ORDER — ALBUTEROL SULFATE 90 UG/1
1-2 AEROSOL, METERED RESPIRATORY (INHALATION) EVERY 6 HOURS PRN
Qty: 18 G | Refills: 5 | Status: SHIPPED | OUTPATIENT
Start: 2024-06-25

## 2024-06-25 NOTE — PROGRESS NOTES
Subjective:       Patient ID: Anthony Hanna is a 68 y.o. male.    Chief Complaint: Follow-up    HPI    68-year-old male here for follow-up.    HTN -  Patient is currently on Norvasc 10 mg. He does not check his BP at home. Side effects of medications note: none. Denies headaches, blurred vision, chest pain, shortness of breath, nausea.  He has been on the low sodium diet.  He does not add salt to his food.    He gets winded when he cuts the yard.  His wife had albuterol.  He tried this when he was winded.  It did not do anything.    HLD/aortic atherosclerosis - Patient is currently on Lipitor 20 mg.  His last lipid panel was   Cholesterol   Date Value Ref Range Status   05/02/2023 150 120 - 199 mg/dL Final     Comment:     The National Cholesterol Education Program (NCEP) has set the  following guidelines (reference ranges) for Cholesterol:  Optimal.....................<200 mg/dL  Borderline High.............200-239 mg/dL  High........................> or = 240 mg/dL       Triglycerides   Date Value Ref Range Status   05/02/2023 113 30 - 150 mg/dL Final     Comment:     The National Cholesterol Education Program (NCEP) has set the  following guidelines (reference values) for triglycerides:  Normal......................<150 mg/dL  Borderline High.............150-199 mg/dL  High........................200-499 mg/dL       HDL   Date Value Ref Range Status   05/02/2023 42 40 - 75 mg/dL Final     Comment:     The National Cholesterol Education Program (NCEP) has set the  following guidelines (reference values) for HDL Cholesterol:  Low...............<40 mg/dL  Optimal...........>60 mg/dL       LDL Cholesterol   Date Value Ref Range Status   05/02/2023 85.4 63.0 - 159.0 mg/dL Final     Comment:     The National Cholesterol Education Program (NCEP) has set the  following guidelines (reference values) for LDL Cholesterol:  Optimal.......................<130 mg/dL  Borderline High...............130-159  mg/dL  High..........................160-189 mg/dL  Very High.....................>190 mg/dL     .  Side effects of the medication: none.    Patient has depression and is on Celexa 40 mg.      Review of Systems   Constitutional:  Negative for activity change.   HENT:  Negative for hearing loss and trouble swallowing.    Eyes:  Negative for discharge.   Respiratory:  Negative for chest tightness and wheezing.    Cardiovascular:  Negative for chest pain and palpitations.   Gastrointestinal:  Negative for constipation, diarrhea and vomiting.   Genitourinary:  Negative for difficulty urinating and hematuria.   Neurological:  Negative for headaches.   Psychiatric/Behavioral:  Negative for dysphoric mood.          Objective:      Physical Exam  Vitals reviewed.   Constitutional:       Appearance: He is well-developed.   HENT:      Head: Normocephalic and atraumatic.      Mouth/Throat:      Pharynx: No oropharyngeal exudate.   Eyes:      General: No scleral icterus.        Right eye: No discharge.         Left eye: No discharge.      Pupils: Pupils are equal, round, and reactive to light.   Neck:      Thyroid: No thyromegaly.      Trachea: No tracheal deviation.   Cardiovascular:      Rate and Rhythm: Normal rate and regular rhythm.      Heart sounds: Normal heart sounds. No murmur heard.     No friction rub. No gallop.   Pulmonary:      Effort: Pulmonary effort is normal. No respiratory distress.      Breath sounds: Normal breath sounds. No wheezing or rales.   Chest:      Chest wall: No tenderness.   Abdominal:      General: Bowel sounds are normal. There is no distension.      Palpations: Abdomen is soft. There is no mass.      Tenderness: There is no abdominal tenderness. There is no guarding or rebound.   Musculoskeletal:         General: No tenderness. Normal range of motion.      Cervical back: Normal range of motion and neck supple.   Skin:     General: Skin is warm and dry.      Coloration: Skin is not pale.       Findings: No erythema or rash.   Neurological:      Mental Status: He is alert and oriented to person, place, and time.   Psychiatric:         Behavior: Behavior normal.         Assessment:       1. Essential hypertension  - CBC Auto Differential; Future  - TSH; Future  - Lipid Panel; Future    2. Other hyperlipidemia    3. Aortic atherosclerosis    4. Major depressive disorder with single episode, in full remission    5. Prostate cancer screening  - PSA, Screening; Future    6. Pulmonary emphysema, unspecified emphysema type  - Complete PFT with bronchodilator; Future    7. Tobacco abuse  - CT Chest Lung Screening Low Dose; Future    8. Personal history of nicotine dependence  - CT Chest Lung Screening Low Dose; Future    9. Disease of skin and subcutaneous tissue  - triamcinolone acetonide 0.1% (KENALOG) 0.1 % cream; AAA bid for 2-4 weeks per course.  Dispense: 454 g; Refill: 0      Plan:       1. Continue amlodipine 10 mg.  Start HCTZ 25 mg.  Return to clinic in a month reassess.  Plan to check CMP at this time.  2/3.  Continue Lipitor 20 mg p.o.   4. Continue Celexa 40 mg  5. Check PSA.    6.  Check PFTs.    7/8.  Check CT lungs.    9.  Kenalog cream refilled.    Check CBC, TSH, lipids.

## 2024-07-01 ENCOUNTER — HOSPITAL ENCOUNTER (OUTPATIENT)
Dept: RADIOLOGY | Facility: HOSPITAL | Age: 69
Discharge: HOME | End: 2024-07-01
Attending: INTERNAL MEDICINE
Payer: MEDICARE

## 2024-07-01 ENCOUNTER — HOSPITAL ENCOUNTER (OUTPATIENT)
Dept: PULMONOLOGY | Facility: CLINIC | Age: 69
Discharge: HOME | End: 2024-07-01
Payer: MEDICARE

## 2024-07-01 DIAGNOSIS — Z87.891 PERSONAL HISTORY OF NICOTINE DEPENDENCE: ICD-10-CM

## 2024-07-01 DIAGNOSIS — Z72.0 TOBACCO ABUSE: ICD-10-CM

## 2024-07-01 DIAGNOSIS — J43.9 PULMONARY EMPHYSEMA, UNSPECIFIED EMPHYSEMA TYPE: ICD-10-CM

## 2024-07-01 LAB
DLCO ADJ PRE: 24.94 ML/(MIN*MMHG) (ref 19.61–33.47)
DLCO SINGLE BREATH LLN: 19.61
DLCO SINGLE BREATH PRE REF: 96.5 %
DLCO SINGLE BREATH REF: 26.54
DLCOC SBVA LLN: 2.64
DLCOC SBVA PRE REF: 100.7 %
DLCOC SBVA REF: 3.83
DLCOC SINGLE BREATH LLN: 19.61
DLCOC SINGLE BREATH PRE REF: 94 %
DLCOC SINGLE BREATH REF: 26.54
DLCOCSBVAULN: 5.02
DLCOCSINGLEBREATHULN: 33.47
DLCOCSINGLEBREATHZSCORE: -0.38
DLCOSINGLEBREATHULN: 33.47
DLCOSINGLEBREATHZSCORE: -0.22
DLCOVA LLN: 2.64
DLCOVA PRE REF: 103.4 %
DLCOVA PRE: 3.96 ML/(MIN*MMHG*L) (ref 2.64–5.02)
DLCOVA REF: 3.83
DLCOVAULN: 5.02
DLVAADJ PRE: 3.86 ML/(MIN*MMHG*L) (ref 2.64–5.02)
ERV LLN: -16448.95
ERV PRE REF: 55 %
ERV REF: 1.05
ERVULN: ABNORMAL
FEF 25 75 LLN: 1.05
FEF 25 75 PRE REF: 89.5 %
FEF 25 75 REF: 2.41
FET100 CHG: 3 %
FEV05 LLN: 1.43
FEV05 REF: 2.57
FEV1 CHG: 9.6 %
FEV1 FVC LLN: 63
FEV1 FVC PRE REF: 97.2 %
FEV1 FVC REF: 76
FEV1 LLN: 2.27
FEV1 PRE REF: 88.9 %
FEV1 REF: 3.13
FEV1FVCZSCORE: -0.28
FEV1ZSCORE: -0.67
FRCPLETH LLN: 2.65
FRCPLETH PREREF: 106.4 %
FRCPLETH REF: 3.63
FRCPLETHULN: 4.62
FVC CHG: 9.5 %
FVC LLN: 3.06
FVC PRE REF: 91.2 %
FVC REF: 4.12
FVCZSCORE: -0.56
IVC PRE: 3.99 L (ref 3.06–5.2)
IVC SINGLE BREATH LLN: 3.06
IVC SINGLE BREATH PRE REF: 96.8 %
IVC SINGLE BREATH REF: 4.12
IVCSINGLEBREATHULN: 5.2
LLN IC: -9999996.94
PEF LLN: 5.98
PEF PRE REF: 77 %
PEF REF: 8.23
PHYSICIAN COMMENT: ABNORMAL
POST FEF 25 75: 2.16 L/S (ref 1.05–4.32)
POST FET 100: 7.22 SEC
POST FEV1 FVC: 74.17 % (ref 62.87–88.03)
POST FEV1: 3.05 L (ref 2.27–3.94)
POST FEV5: 2.17 L (ref 1.43–3.7)
POST FVC: 4.12 L (ref 3.06–5.2)
POST PEF: 7.64 L/S (ref 5.98–10.48)
PRE DLCO: 25.61 ML/(MIN*MMHG) (ref 19.61–33.47)
PRE ERV: 0.58 L (ref -16448.95–16451.05)
PRE FEF 25 75: 2.16 L/S (ref 1.05–4.32)
PRE FET 100: 7.02 SEC
PRE FEV05 REF: 77.6 %
PRE FEV1 FVC: 74.07 % (ref 62.87–88.03)
PRE FEV1: 2.79 L (ref 2.27–3.94)
PRE FEV5: 1.99 L (ref 1.43–3.7)
PRE FRC PL: 3.86 L (ref 2.65–4.62)
PRE FVC: 3.76 L (ref 3.06–5.2)
PRE IC: 3.41 L (ref -9999996.94–#######.####)
PRE PEF: 6.34 L/S (ref 5.98–10.48)
PRE REF IC: 111.5 %
PRE RV: 3.29 L (ref 1.91–3.26)
PRE TLC: 7.28 L (ref 5.77–8.07)
RAW PRE REF: 86.5 %
RAW PRE: 2.65 CMH2O*S/L (ref 3.06–3.06)
RAW REF: 3.06
REF IC: 3.06
RV LLN: 1.91
RV PRE REF: 127.2 %
RV REF: 2.58
RVTLC LLN: 32
RVTLC PRE REF: 110.5 %
RVTLC PRE: 45.17 % (ref 31.89–49.85)
RVTLC REF: 41
RVTLCULN: 50
RVULN: 3.26
SGAW PRE REF: 94.4 %
SGAW PRE: 0.08 1/(CMH2O*S) (ref 0.08–0.08)
SGAW REF: 0.08
TLC LLN: 5.77
TLC PRE REF: 105.1 %
TLC REF: 6.92
TLC ULN: 8.07
TLCZSCORE: 0.51
ULN IC: ABNORMAL
VA PRE: 6.46 L (ref 6.77–6.77)
VA SINGLE BREATH LLN: 6.77
VA SINGLE BREATH PRE REF: 95.4 %
VA SINGLE BREATH REF: 6.77
VASINGLEBREATHULN: 6.77
VC LLN: 3.06
VC PRE REF: 96.8 %
VC PRE: 3.99 L (ref 3.06–5.2)
VC REF: 4.12
VC ULN: 5.2

## 2024-07-01 PROCEDURE — 71271 CT THORAX LUNG CANCER SCR C-: CPT | Mod: TC

## 2024-07-01 PROCEDURE — 71271 CT THORAX LUNG CANCER SCR C-: CPT | Mod: 26,,, | Performed by: STUDENT IN AN ORGANIZED HEALTH CARE EDUCATION/TRAINING PROGRAM

## 2024-07-01 PROCEDURE — 94060 EVALUATION OF WHEEZING: CPT | Mod: PBBFAC | Performed by: INTERNAL MEDICINE

## 2024-07-01 PROCEDURE — 94726 PLETHYSMOGRAPHY LUNG VOLUMES: CPT | Mod: PBBFAC | Performed by: INTERNAL MEDICINE

## 2024-07-01 PROCEDURE — 94729 DIFFUSING CAPACITY: CPT | Mod: PBBFAC | Performed by: INTERNAL MEDICINE

## 2024-09-25 DIAGNOSIS — Z00.00 ENCOUNTER FOR MEDICARE ANNUAL WELLNESS EXAM: ICD-10-CM

## 2025-02-21 DIAGNOSIS — Z00.00 ENCOUNTER FOR MEDICARE ANNUAL WELLNESS EXAM: ICD-10-CM

## 2025-03-24 ENCOUNTER — PATIENT MESSAGE (OUTPATIENT)
Dept: INTERNAL MEDICINE | Facility: CLINIC | Age: 70
End: 2025-03-24
Payer: MEDICARE

## 2025-05-04 ENCOUNTER — OFFICE VISIT (OUTPATIENT)
Dept: URGENT CARE | Facility: CLINIC | Age: 70
End: 2025-05-04
Payer: MEDICARE

## 2025-05-04 ENCOUNTER — RESULTS FOLLOW-UP (OUTPATIENT)
Dept: URGENT CARE | Facility: CLINIC | Age: 70
End: 2025-05-04

## 2025-05-04 VITALS
HEIGHT: 69 IN | RESPIRATION RATE: 18 BRPM | OXYGEN SATURATION: 95 % | TEMPERATURE: 99 F | BODY MASS INDEX: 30.81 KG/M2 | SYSTOLIC BLOOD PRESSURE: 126 MMHG | WEIGHT: 208 LBS | DIASTOLIC BLOOD PRESSURE: 81 MMHG | HEART RATE: 80 BPM

## 2025-05-04 DIAGNOSIS — M79.645 FINGER PAIN, LEFT: Primary | ICD-10-CM

## 2025-05-04 DIAGNOSIS — L03.012 CELLULITIS OF LEFT FINGER: ICD-10-CM

## 2025-05-04 PROCEDURE — 99213 OFFICE O/P EST LOW 20 MIN: CPT | Mod: S$GLB,,, | Performed by: PHYSICIAN ASSISTANT

## 2025-05-04 PROCEDURE — 73140 X-RAY EXAM OF FINGER(S): CPT | Mod: FY,LT,S$GLB, | Performed by: RADIOLOGY

## 2025-05-04 RX ORDER — SULFAMETHOXAZOLE AND TRIMETHOPRIM 800; 160 MG/1; MG/1
1 TABLET ORAL 2 TIMES DAILY
Qty: 14 TABLET | Refills: 0 | Status: SHIPPED | OUTPATIENT
Start: 2025-05-04 | End: 2025-05-11

## 2025-05-04 NOTE — PROGRESS NOTES
"Subjective:      Patient ID: Anthony Hanna is a 69 y.o. male.    Vitals:  height is 5' 9" (1.753 m) and weight is 94.3 kg (208 lb). His oral temperature is 99.1 °F (37.3 °C). His blood pressure is 126/81 and his pulse is 80. His respiration is 18 and oxygen saturation is 95%.     Chief Complaint: Foreign Body    This is a 69 y.o. male who presents today with a chief complaint of foreign body. Patient may having a wood splinter in his left index finger. Incident happened on Wednesday.  Patient states he did was not wearing gloves.  He initially pulled the splinter out in the his wife who is a nurse enough the cyst took it tweezers and grabbed a piece out as well.  Patient rates his pain an 8/10 when it is pushed 3/10 when he is just sitting.  Took Advil with some pain relief.  There is swelling.  Patient denies any fevers or chills, numbness, decreased range of motion.      Foreign Body  The incident occurred 3 to 5 days ago. Suspected object: wood. Intake: finger. The incident was witnessed/reported by The patient. Pertinent negatives include no fever.       Constitution: Negative for chills and fever.   Musculoskeletal:  Positive for pain, trauma and joint swelling. Negative for joint pain and abnormal ROM of joint.   Skin:  Positive for puncture wound and erythema. Negative for rash and bruising.   Neurological:  Negative for numbness, tingling and tremors.      Past Medical History:   Diagnosis Date    Ankle fracture     Cataract     Essential hypertension     HLD (hyperlipidemia)     Nuclear sclerosis - Both Eyes 04/16/2013    patient states he is not familiar with this diagnosis        Past Surgical History:   Procedure Laterality Date    BACK SURGERY      COLONOSCOPY N/A 12/11/2018    Procedure: COLONOSCOPY;  Surgeon: PUMA Fernandez MD;  Location: [facility] ([address] [place] [address]);  Service: Endoscopy;  Laterality: N/A;    FRACTURE SURGERY      right ankle      SPINE SURGERY         Family History   Problem Relation " Name Age of Onset    Glaucoma Mother      Stroke Mother      Heart disease Mother          valve replacement    No Known Problems Father      No Known Problems Sister      Hypertension Brother      Hyperlipidemia Brother      No Known Problems Maternal Aunt      No Known Problems Maternal Uncle      No Known Problems Paternal Aunt      No Known Problems Paternal Uncle      Heart disease Maternal Grandmother      Heart disease Maternal Grandfather      No Known Problems Paternal Grandmother      No Known Problems Paternal Grandfather      Amblyopia Neg Hx      Blindness Neg Hx      Cancer Neg Hx      Cataracts Neg Hx      Diabetes Neg Hx      Macular degeneration Neg Hx      Retinal detachment Neg Hx      Strabismus Neg Hx      Thyroid disease Neg Hx         Social History     Socioeconomic History    Marital status:    Tobacco Use    Smoking status: Former     Current packs/day: 0.00     Average packs/day: 0.5 packs/day for 15.0 years (7.5 ttl pk-yrs)     Types: Cigarettes     Start date: 10/31/2005     Quit date: 10/31/2020     Years since quittin.5     Passive exposure: Never    Smokeless tobacco: Never    Tobacco comments:     In Smoking cessation program   Substance and Sexual Activity    Alcohol use: Yes     Comment: couple beers a week or less    Drug use: No    Sexual activity: Yes     Partners: Female     Birth control/protection: None     Comment:      Social Drivers of Health     Financial Resource Strain: Low Risk  (2024)    Overall Financial Resource Strain (CARDIA)     Difficulty of Paying Living Expenses: Not hard at all   Food Insecurity: No Food Insecurity (2024)    Hunger Vital Sign     Worried About Running Out of Food in the Last Year: Never true     Ran Out of Food in the Last Year: Never true   Transportation Needs: No Transportation Needs (3/6/2021)    PRAPARE - Transportation     Lack of Transportation (Medical): No     Lack of Transportation (Non-Medical): No    Physical Activity: Insufficiently Active (6/24/2024)    Exercise Vital Sign     Days of Exercise per Week: 3 days     Minutes of Exercise per Session: 30 min   Stress: Stress Concern Present (6/24/2024)    [employer] [place] [employer] - Occupational Stress Questionnaire     Feeling of Stress : To some extent   Housing Stability: Unknown (6/24/2024)    Housing Stability Vital Sign     Unable to Pay for Housing in the Last Year: No       Current Medications[1]    Review of patient's allergies indicates:   Allergen Reactions    Pcn [penicillins] Anaphylaxis    Candesartan      Itching - drug rash       Objective:     Physical Exam   Constitutional:  Non-toxic appearance. He does not appear ill. No distress.   Abdominal: Normal appearance.   Musculoskeletal: Normal range of motion.         General: Swelling, tenderness and signs of injury present. No deformity. Normal range of motion.      Left hand: Left index finger: Exhibits swelling and tenderness.        Hands:    Neurological: He is alert.   Skin: Skin is warm, dry, not diaphoretic, not pale and no rash. erythema No bruising   Nursing note and vitals reviewed.      Assessment:     1. Finger pain, left    2. Cellulitis of left finger        Plan:       Finger pain, left  -     X-Ray Finger 2 or More Views Left  -     sulfamethoxazole-trimethoprim 800-160mg (BACTRIM DS) 800-160 mg Tab; Take 1 tablet by mouth 2 (two) times daily. for 7 days  Dispense: 14 tablet; Refill: 0    Cellulitis of left finger  -     X-Ray Finger 2 or More Views Left  -     sulfamethoxazole-trimethoprim 800-160mg (BACTRIM DS) 800-160 mg Tab; Take 1 tablet by mouth 2 (two) times daily. for 7 days  Dispense: 14 tablet; Refill: 0           Results reviewed  I have reviewed the patient chart and pertinent past imaging/labs.       Patient Instructions   Do not soak the area in water (no tubs or swimming). Wash the area with antibacterial soap and water at least twice daily unless more  frequently soiled, then clean more often.  Do not use neosporin/polysporin, hydrogen peroxide or alcohol. You were prescribed bactrim for a antimicrobial. Discussed possible s/e including SJS.   All wounds will leave a scar. To prevent the scar from bleaching in the sun make sure to wear sunscreen in the sun after the wound has healed. We will notify you of official results once available. I did not see any foreign body on preliminary results. Use home tylenol/advil rotate every 4 hours with food as needed for pain relief. Follow up with urgent care if symptoms do not improve in 2-3 days. You declined a referral to hand specialist should you change your mind please notify urgent care.              [1]   Current Outpatient Medications   Medication Sig Dispense Refill    amLODIPine (NORVASC) 10 MG tablet Take 1 tablet (10 mg total) by mouth once daily. 90 tablet 3    atorvastatin (LIPITOR) 20 MG tablet Take 1 tablet (20 mg total) by mouth once daily. 90 tablet 3    citalopram (CELEXA) 40 MG tablet Take 1 tablet (40 mg total) by mouth once daily. 90 tablet 3    hydroCHLOROthiazide (HYDRODIURIL) 25 MG tablet Take 1 tablet (25 mg total) by mouth once daily. 90 tablet 3    albuterol (PROVENTIL/VENTOLIN HFA) 90 mcg/actuation inhaler Inhale 1-2 puffs into the lungs every 6 (six) hours as needed for Shortness of Breath. (Patient not taking: Reported on 5/4/2025) 18 g 5     No current facility-administered medications for this visit.

## 2025-05-04 NOTE — PATIENT INSTRUCTIONS
Do not soak the area in water (no tubs or swimming). Wash the area with antibacterial soap and water at least twice daily unless more frequently soiled, then clean more often.  Do not use neosporin/polysporin, hydrogen peroxide or alcohol. You were prescribed bactrim for a antimicrobial. Discussed possible s/e including SJS.   All wounds will leave a scar. To prevent the scar from bleaching in the sun make sure to wear sunscreen in the sun after the wound has healed. We will notify you of official results once available. I did not see any foreign body on preliminary results. Use home tylenol/advil rotate every 4 hours with food as needed for pain relief. Follow up with urgent care if symptoms do not improve in 2-3 days. You declined a referral to hand specialist should you change your mind please notify urgent care.

## 2025-06-16 ENCOUNTER — PATIENT MESSAGE (OUTPATIENT)
Dept: INTERNAL MEDICINE | Facility: CLINIC | Age: 70
End: 2025-06-16
Payer: MEDICARE

## 2025-06-16 DIAGNOSIS — I10 ESSENTIAL HYPERTENSION: Chronic | ICD-10-CM

## 2025-06-16 DIAGNOSIS — Z12.5 PROSTATE CANCER SCREENING: ICD-10-CM

## 2025-06-17 ENCOUNTER — TELEPHONE (OUTPATIENT)
Dept: INTERNAL MEDICINE | Facility: CLINIC | Age: 70
End: 2025-06-17
Payer: MEDICARE

## 2025-06-17 ENCOUNTER — PATIENT MESSAGE (OUTPATIENT)
Dept: INTERNAL MEDICINE | Facility: CLINIC | Age: 70
End: 2025-06-17
Payer: MEDICARE

## 2025-06-17 DIAGNOSIS — Z72.0 TOBACCO ABUSE: ICD-10-CM

## 2025-06-17 DIAGNOSIS — Z12.2 SCREENING FOR LUNG CANCER: Primary | ICD-10-CM

## 2025-06-17 DIAGNOSIS — Z87.891 PERSONAL HISTORY OF NICOTINE DEPENDENCE: ICD-10-CM

## 2025-06-17 NOTE — TELEPHONE ENCOUNTER
Copied from CRM #[id number]. Topic: General Inquiry - Patient Advice  >> Jun 17, 2025 12:34 PM Toña wrote:  Type: Orders Request    What orders/ testing are being requested?  low dosage CT lung scan     Is there a future appointment scheduled for the patient with PCP?Yes     When? 6/27/25    Would you prefer a response via [employer]?No    Comments:Please call pt to advise [phone]

## 2025-06-17 NOTE — TELEPHONE ENCOUNTER
Called pt, informed we are waiting for order to be signed, then our schedulers will reach out to help schedule.

## 2025-06-23 ENCOUNTER — HOSPITAL ENCOUNTER (OUTPATIENT)
Dept: RADIOLOGY | Facility: HOSPITAL | Age: 70
Discharge: HOME | End: 2025-06-23
Attending: HOSPITALIST
Payer: MEDICARE

## 2025-06-23 ENCOUNTER — RESULTS FOLLOW-UP (OUTPATIENT)
Dept: INTERNAL MEDICINE | Facility: CLINIC | Age: 70
End: 2025-06-23

## 2025-06-23 DIAGNOSIS — Z72.0 TOBACCO ABUSE: ICD-10-CM

## 2025-06-23 DIAGNOSIS — Z87.891 PERSONAL HISTORY OF NICOTINE DEPENDENCE: ICD-10-CM

## 2025-06-23 DIAGNOSIS — Z12.2 SCREENING FOR LUNG CANCER: ICD-10-CM

## 2025-06-23 PROCEDURE — 71271 CT THORAX LUNG CANCER SCR C-: CPT | Mod: TC

## 2025-06-23 PROCEDURE — 71271 CT THORAX LUNG CANCER SCR C-: CPT | Mod: 26,,, | Performed by: STUDENT IN AN ORGANIZED HEALTH CARE EDUCATION/TRAINING PROGRAM

## 2025-06-26 ENCOUNTER — TELEPHONE (OUTPATIENT)
Dept: INTERNAL MEDICINE | Facility: CLINIC | Age: 70
End: 2025-06-26
Payer: MEDICARE

## 2025-06-26 NOTE — TELEPHONE ENCOUNTER
Copied from CRM #[id number]. Topic: General Inquiry - Test Results  >> Jun 26, 2025  3:21 PM Cecilia wrote:  Patient called, requested a call back from nurse in regards his test results, test done on 06/23/25.  # [phone] Thank you.

## 2025-06-26 NOTE — TELEPHONE ENCOUNTER
Pt wanted results of CT.    Pt would like to know what his results are for Low Dose CT. Pt was informed results may take up to 7 business days.

## 2025-06-27 ENCOUNTER — OFFICE VISIT (OUTPATIENT)
Dept: INTERNAL MEDICINE | Facility: CLINIC | Age: 70
End: 2025-06-27
Payer: MEDICARE

## 2025-06-27 ENCOUNTER — RESULTS FOLLOW-UP (OUTPATIENT)
Dept: INTERNAL MEDICINE | Facility: CLINIC | Age: 70
End: 2025-06-27

## 2025-06-27 VITALS
WEIGHT: 204.13 LBS | HEIGHT: 69 IN | SYSTOLIC BLOOD PRESSURE: 122 MMHG | HEART RATE: 75 BPM | TEMPERATURE: 97 F | BODY MASS INDEX: 30.23 KG/M2 | DIASTOLIC BLOOD PRESSURE: 82 MMHG | OXYGEN SATURATION: 96 % | RESPIRATION RATE: 14 BRPM

## 2025-06-27 DIAGNOSIS — I70.0 AORTIC ATHEROSCLEROSIS: Chronic | ICD-10-CM

## 2025-06-27 DIAGNOSIS — E78.49 OTHER HYPERLIPIDEMIA: Chronic | ICD-10-CM

## 2025-06-27 DIAGNOSIS — I25.10 ATHEROSCLEROSIS OF CORONARY ARTERY, UNSPECIFIED VESSEL OR LESION TYPE, UNSPECIFIED WHETHER ANGINA PRESENT, UNSPECIFIED WHETHER NATIVE OR TRANSPLANTED HEART: Primary | ICD-10-CM

## 2025-06-27 DIAGNOSIS — Z00.00 ENCOUNTER FOR ANNUAL HEALTH EXAMINATION: Primary | ICD-10-CM

## 2025-06-27 DIAGNOSIS — I10 ESSENTIAL HYPERTENSION: Chronic | ICD-10-CM

## 2025-06-27 DIAGNOSIS — F32.5 MAJOR DEPRESSIVE DISORDER WITH SINGLE EPISODE, IN FULL REMISSION: Chronic | ICD-10-CM

## 2025-06-27 DIAGNOSIS — Z72.0 TOBACCO ABUSE: Chronic | ICD-10-CM

## 2025-06-27 DIAGNOSIS — J43.9 PULMONARY EMPHYSEMA, UNSPECIFIED EMPHYSEMA TYPE: ICD-10-CM

## 2025-06-27 PROCEDURE — 99213 OFFICE O/P EST LOW 20 MIN: CPT | Mod: PBBFAC,PO

## 2025-06-27 PROCEDURE — 99999 PR PBB SHADOW E&M-EST. PATIENT-LVL III: CPT | Mod: PBBFAC,,,

## 2025-06-27 RX ORDER — ATORVASTATIN CALCIUM 20 MG/1
20 TABLET, FILM COATED ORAL DAILY
Qty: 90 TABLET | Refills: 3 | Status: SHIPPED | OUTPATIENT
Start: 2025-06-27

## 2025-06-27 RX ORDER — AMLODIPINE BESYLATE 10 MG/1
10 TABLET ORAL DAILY
Qty: 90 TABLET | Refills: 3 | Status: SHIPPED | OUTPATIENT
Start: 2025-06-27

## 2025-06-27 RX ORDER — CITALOPRAM 40 MG/1
40 TABLET ORAL DAILY
Qty: 90 TABLET | Refills: 3 | Status: SHIPPED | OUTPATIENT
Start: 2025-06-27

## 2025-06-27 RX ORDER — ALBUTEROL SULFATE 90 UG/1
1-2 INHALANT RESPIRATORY (INHALATION) EVERY 6 HOURS PRN
Qty: 18 G | Refills: 5 | Status: SHIPPED | OUTPATIENT
Start: 2025-06-27

## 2025-06-27 RX ORDER — HYDROCHLOROTHIAZIDE 25 MG/1
25 TABLET ORAL DAILY
Qty: 90 TABLET | Refills: 3 | Status: SHIPPED | OUTPATIENT
Start: 2025-06-27 | End: 2026-06-27

## 2025-06-27 NOTE — TELEPHONE ENCOUNTER
"Sent pt a message in [employer] re:     I received a message back from the radiologist.     "It is taking up to a week to get these results."  "

## 2025-06-27 NOTE — PROGRESS NOTES
69 y.o. male here for annual exam.     Cholesterol: UTD  Vaccines: Influenza -out of season ; Tetanus - UTD, next due 2027; Prevnar 20 - needs; Zoster - needs; COVID - UTD  Sexual Screening:   STD screening: no concerns  Eye exam: last eye exam a few months ago  Prostate: UTD, 2.65  Colonoscopy: UTD, next due 2028  A1c: UTD    Exercise: He walks his dog, fishes, and does yard work. He does not have a formal exercise regimen.   Diet: He eats home cooked meals.     History of Present Illness               Review of Systems  Physical Exam  Vitals reviewed.   Constitutional:       General: He is not in acute distress.     Appearance: Normal appearance. He is not ill-appearing, toxic-appearing or diaphoretic.   HENT:      Head: Normocephalic and atraumatic.      Right Ear: Tympanic membrane, ear canal and external ear normal.      Left Ear: Tympanic membrane, ear canal and external ear normal.      Nose: Nose normal.      Mouth/Throat:      Mouth: Mucous membranes are moist.      Pharynx: Oropharynx is clear. No oropharyngeal exudate.   Eyes:      General: No scleral icterus.     Conjunctiva/sclera: Conjunctivae normal.      Pupils: Pupils are equal, round, and reactive to light.   Cardiovascular:      Rate and Rhythm: Normal rate and regular rhythm.      Pulses: Normal pulses.           Radial pulses are 2+ on the right side and 2+ on the left side.        Dorsalis pedis pulses are 2+ on the right side and 2+ on the left side.      Heart sounds: Normal heart sounds. No murmur heard.     No gallop.   Pulmonary:      Effort: Pulmonary effort is normal. No respiratory distress.      Breath sounds: Normal breath sounds. No wheezing, rhonchi or rales.   Abdominal:      General: Bowel sounds are normal. There is no distension.      Palpations: Abdomen is soft. There is no mass.      Tenderness: There is no abdominal tenderness. There is no guarding or rebound.   Musculoskeletal:         General: No swelling or  deformity. Normal range of motion.      Cervical back: Normal range of motion and neck supple.      Right lower leg: No edema.      Left lower leg: No edema.   Skin:     General: Skin is warm and dry.      Capillary Refill: Capillary refill takes less than 2 seconds.   Neurological:      Mental Status: He is alert and oriented to person, place, and time. Mental status is at baseline.      GCS: GCS eye subscore is 4. GCS verbal subscore is 5. GCS motor subscore is 6.   Psychiatric:         Behavior: Behavior normal.          Past Medical History:   Diagnosis Date    Ankle fracture     Cataract     Essential hypertension     HLD (hyperlipidemia)     Nuclear sclerosis - Both Eyes 04/16/2013    patient states he is not familiar with this diagnosis      Past Surgical History:   Procedure Laterality Date    BACK SURGERY      COLONOSCOPY N/A 12/11/2018    Procedure: COLONOSCOPY;  Surgeon: PUMA Fernandez MD;  Location: [facility] ([address] [place] [address]);  Service: Endoscopy;  Laterality: N/A;    FRACTURE SURGERY      right ankle      SPINE SURGERY       Social History[1]  Review of patient's allergies indicates:   Allergen Reactions    Pcn [penicillins] Anaphylaxis    Candesartan      Itching - drug rash     Anthony Hanna had no medications administered during this visit.    Assessment & Plan               Anthony was seen today for shortness of breath and annual exam.    Diagnoses and all orders for this visit:    Encounter for annual health examination  Discussed all age related screenings which are currently up-to-date.  Discussed vaccines--patient defers Prevnar immunization.  COVID vaccination to be obtained at pharmacy if patient desires.  Discussed diet and exercise.    Essential hypertension  Chronic, stable.  Continue amlodipine and hydrochlorothiazide as prescribed.  Refills provided on medications.  Monitor.    Other hyperlipidemia  Aortic atherosclerosis  Chronic, stable.  Continue atorvastatin as prescribed.  Refills  provided.  Monitor.    Major depressive disorder with single episode, in full remission  Chronic, stable.  Continue citalopram as prescribed.  Refills provided.  Monitor.    Pulmonary emphysema  Chronic, stable.  Continue as needed albuterol use.  Monitor.    Tobacco abuse  Chronic.  Smoking cessation advised.  Monitor.    Other orders  -     albuterol (PROVENTIL/VENTOLIN HFA) 90 mcg/actuation inhaler; Inhale 1-2 puffs into the lungs every 6 (six) hours as needed for Shortness of Breath.  -     amLODIPine (NORVASC) 10 MG tablet; Take 1 tablet (10 mg total) by mouth once daily.  -     atorvastatin (LIPITOR) 20 MG tablet; Take 1 tablet (20 mg total) by mouth once daily.  -     citalopram (CELEXA) 40 MG tablet; Take 1 tablet (40 mg total) by mouth once daily.  -     hydroCHLOROthiazide (HYDRODIURIL) 25 MG tablet; Take 1 tablet (25 mg total) by mouth once daily.      RETURN TO CLINIC IN 1 YEAR FOR NEXT ANNUAL EXAM OR SOONER SHOULD CONCERNS ARISE.         Zhang Drake,MSN, APRN, FNP-C  [facility], Internal Medicine  9:34 AM         [1]   Social History  Socioeconomic History    Marital status:    Tobacco Use    Smoking status: Former     Current packs/day: 0.00     Average packs/day: 0.5 packs/day for 15.0 years (7.5 ttl pk-yrs)     Types: Cigarettes     Start date: 10/31/2005     Quit date: 10/31/2020     Years since quittin.6     Passive exposure: Never    Smokeless tobacco: Never    Tobacco comments:     In Smoking cessation program   Substance and Sexual Activity    Alcohol use: Yes     Comment: couple beers a week or less    Drug use: No    Sexual activity: Yes     Partners: Female     Birth control/protection: None     Comment:      Social Drivers of Health     Financial Resource Strain: Low Risk  (2024)    Overall Financial Resource Strain (CARDIA)     Difficulty of Paying Living Expenses: Not hard at all   Food Insecurity: No Food Insecurity (2024)    Hunger Vital  Sign     Worried About Running Out of Food in the Last Year: Never true     Ran Out of Food in the Last Year: Never true   Transportation Needs: No Transportation Needs (3/6/2021)    PRAPARE - Transportation     Lack of Transportation (Medical): No     Lack of Transportation (Non-Medical): No   Physical Activity: Insufficiently Active (6/24/2024)    Exercise Vital Sign     Days of Exercise per Week: 3 days     Minutes of Exercise per Session: 30 min   Stress: Stress Concern Present (6/24/2024)    [employer] - Occupational Stress Questionnaire     Feeling of Stress : To some extent   Housing Stability: Unknown (6/24/2024)    Housing Stability Vital Sign     Unable to Pay for Housing in the Last Year: No

## 2025-06-30 ENCOUNTER — TELEPHONE (OUTPATIENT)
Dept: INTERNAL MEDICINE | Facility: CLINIC | Age: 70
End: 2025-06-30
Payer: MEDICARE

## 2025-06-30 NOTE — TELEPHONE ENCOUNTER
Please call and notify patient that due to reports of dyspnea on exertion, I have ordered a stress echo for further evaluation.  The referral coordinator will be reaching out to assist him in scheduling this appointment.    KEYA

## 2025-06-30 NOTE — TELEPHONE ENCOUNTER
Patient has a stress test ordered because of the shortness of breath and the atherosclerosis found on the CT scan.  My will also place a referral to Cardiology for him.

## 2025-06-30 NOTE — TELEPHONE ENCOUNTER
"Pt wants to know if he should see Cardiology d/t "Mild atheromatous disease is seen in the aorta. The coronary arteries show moderate multivessel calcified atheromatous disease"  "

## 2025-06-30 NOTE — TELEPHONE ENCOUNTER
Copied from CRM #[id number]. Topic: Escalation - Escalation To Clinic  >> Jun 30, 2025  9:49 AM Cecilia wrote:  Patient called, would like to get a call back from SHWETA Drake.   In regards test. Thank you.  >> Jun 30, 2025  9:59 AM Med Assistant Holly wrote:    ----- Message -----  From: Cecilia Abbott  Sent: 6/30/2025   9:50 AM CDT  To: Vidal Khan

## 2025-06-30 NOTE — TELEPHONE ENCOUNTER
"LVM for pt:    "  Please call and notify patient that due to reports of dyspnea on exertion, I have ordered a stress echo for further evaluation.  The referral coordinator will be reaching out to assist him in scheduling this appointment.     RM      "  Msg sent to referral coordinators.  "

## 2025-06-30 NOTE — TELEPHONE ENCOUNTER
We received another msg from pt. RM will call pt later today to discuss Stress Echo & Cardiology Referral.

## 2025-06-30 NOTE — TELEPHONE ENCOUNTER
"Sent pt a message in [email or web address] re:      "  Patient has a stress test ordered because of the shortness of breath and the atherosclerosis found on the CT scan.  I will also place a referral to Cardiology for him.      "     Someone from scheduling will contact you to schedule your Stress Echo and Cardiology consult.    Msg sent to referral coordinators.  "

## 2025-07-01 ENCOUNTER — TELEPHONE (OUTPATIENT)
Dept: INTERNAL MEDICINE | Facility: CLINIC | Age: 70
End: 2025-07-01
Payer: MEDICARE

## 2025-07-01 NOTE — TELEPHONE ENCOUNTER
Called patient to discuss ordered stress test and answer questions regarding necessity of test.     KEYA

## 2025-07-07 ENCOUNTER — TELEPHONE (OUTPATIENT)
Dept: INTERNAL MEDICINE | Facility: CLINIC | Age: 70
End: 2025-07-07
Payer: MEDICARE

## 2025-07-07 ENCOUNTER — HOSPITAL ENCOUNTER (OUTPATIENT)
Dept: CARDIOLOGY | Facility: HOSPITAL | Age: 70
Discharge: HOME | End: 2025-07-07
Payer: MEDICARE

## 2025-07-07 VITALS — BODY MASS INDEX: 30.36 KG/M2 | WEIGHT: 205 LBS | HEIGHT: 69 IN

## 2025-07-07 DIAGNOSIS — R06.02 SHORTNESS OF BREATH: ICD-10-CM

## 2025-07-07 LAB
AORTIC SIZE INDEX (SOV): 1.6 CM/M2
AORTIC SIZE INDEX: 1.5 CM/M2
ASCENDING AORTA: 3.1 CM
AV AREA BY CONTINUOUS VTI: 2.4 CM2
AV INDEX (PROSTH): 0.69
AV LVOT MEAN GRADIENT: 2 MMHG
AV LVOT PEAK GRADIENT: 3 MMHG
AV MEAN GRADIENT: 4 MMHG
AV PEAK GRADIENT: 7 MMHG
AV VALVE AREA BY VELOCITY RATIO: 2.1 CM²
AV VALVE AREA: 2.4 CM2
AV VELOCITY RATIO: 0.62
BSA FOR ECHO PROCEDURE: 2.13 M2
CV ECHO LV RWT: 0.42 CM
CV STRESS BASE HR: 75 BPM
DIASTOLIC BLOOD PRESSURE: 85 MMHG
DOP CALC AO PEAK VEL: 1.3 M/S
DOP CALC AO VTI: 22.4 CM
DOP CALC LVOT AREA: 3.5 CM2
DOP CALC LVOT DIAMETER: 2.1 CM
DOP CALC LVOT PEAK VEL: 0.8 M/S
DOP CALC LVOT STROKE VOLUME: 53.7 CM3
DOP CALCLVOT PEAK VEL VTI: 15.5 CM
E WAVE DECELERATION TIME: 259 MS
E/A RATIO: 0.69
E/E' RATIO: 10 M/S
ECHO EF ESTIMATED: 60 %
ECHO LV POSTERIOR WALL: 0.9 CM (ref 0.6–1.1)
EJECTION FRACTION: 58 %
FRACTIONAL SHORTENING: 32.6 % (ref 28–44)
INTERVENTRICULAR SEPTUM: 0.9 CM (ref 0.6–1.1)
IVC DIAMETER: 1.21 CM
IVRT: 117 MS
LA MAJOR: 5.6 CM
LA MINOR: 5.1 CM
LA WIDTH: 3.6 CM
LEFT ATRIUM SIZE: 3.3 CM
LEFT ATRIUM VOLUME INDEX: 26 ML/M2
LEFT ATRIUM VOLUME: 54 CM3
LEFT INTERNAL DIMENSION IN SYSTOLE: 2.9 CM (ref 2.1–4)
LEFT VENTRICLE DIASTOLIC VOLUME INDEX: 40.19 ML/M2
LEFT VENTRICLE DIASTOLIC VOLUME: 84 ML
LEFT VENTRICLE MASS INDEX: 59 G/M2
LEFT VENTRICLE SYSTOLIC VOLUME INDEX: 15.8 ML/M2
LEFT VENTRICLE SYSTOLIC VOLUME: 33 ML
LEFT VENTRICULAR INTERNAL DIMENSION IN DIASTOLE: 4.3 CM (ref 3.5–6)
LEFT VENTRICULAR MASS: 123.3 G
LV LATERAL E/E' RATIO: 8.4
LV SEPTAL E/E' RATIO: 11.8
MV A" WAVE DURATION": 111.32 MS
MV PEAK A VEL: 0.85 M/S
MV PEAK E VEL: 0.59 M/S
OHS CV CPX 1 MINUTE RECOVERY HEART RATE: 112 BPM
OHS CV CPX 85 PERCENT MAX PREDICTED HEART RATE MALE: 128
OHS CV CPX ESTIMATED METS: 11
OHS CV CPX MAX PREDICTED HEART RATE: 150
OHS CV CPX PATIENT IS FEMALE: 0
OHS CV CPX PATIENT IS MALE: 1
OHS CV CPX PEAK DIASTOLIC BLOOD PRESSURE: 54 MMHG
OHS CV CPX PEAK HEAR RATE: 130 BPM
OHS CV CPX PEAK RATE PRESSURE PRODUCT: NORMAL
OHS CV CPX PEAK SYSTOLIC BLOOD PRESSURE: 175 MMHG
OHS CV CPX PERCENT MAX PREDICTED HEART RATE ACHIEVED: 87
OHS CV CPX RATE PRESSURE PRODUCT PRESENTING: 9375
OHS CV RV/LV RATIO: 0.63 CM
PISA TR MAX VEL: 2 M/S
POST STRESS EJECTION FRACTION: 68 %
PULM VEIN A" WAVE DURATION": 111.32 MS
PULM VEIN S/D RATIO: 1.63
PULMONIC VEIN PEAK A VELOCITY: 0.3 M/S
PV PEAK D VEL: 0.32 M/S
PV PEAK S VEL: 0.52 M/S
RA MAJOR: 4.95 CM
RA PRESSURE ESTIMATED: 3 MMHG
RA WIDTH: 3.21 CM
RIGHT VENTRICLE DIASTOLIC BASEL DIMENSION: 2.7 CM
RV TB RVSP: 5 MMHG
RV TISSUE DOPPLER FREE WALL SYSTOLIC VELOCITY 1 (APICAL 4 CHAMBER VIEW): 16.58 CM/S
SINUS: 3.4 CM
STJ: 3 CM
STRESS ECHO POST EXERCISE DUR MIN: 6 MINUTES
STRESS ECHO POST EXERCISE DUR SEC: 37 SECONDS
SYSTOLIC BLOOD PRESSURE: 125 MMHG
TDI LATERAL: 0.07 M/S
TDI SEPTAL: 0.05 M/S
TDI: 0.06 M/S
TRICUSPID ANNULAR PLANE SYSTOLIC EXCURSION: 2.1 CM
TV PEAK GRADIENT: 16 MMHG
TV REST PULMONARY ARTERY PRESSURE: 19 MMHG
Z-SCORE OF LEFT VENTRICULAR DIMENSION IN END DIASTOLE: -4.05
Z-SCORE OF LEFT VENTRICULAR DIMENSION IN END SYSTOLE: -2.43

## 2025-07-07 PROCEDURE — 93351 STRESS TTE COMPLETE: CPT

## 2025-07-07 PROCEDURE — 93351 STRESS TTE COMPLETE: CPT | Mod: 26,,, | Performed by: INTERNAL MEDICINE

## 2025-07-07 NOTE — TELEPHONE ENCOUNTER
Copied from CRM #[id number]. Topic: General Inquiry - Patient Advice  >> Jul 1, 2025 10:41 AM Nikia wrote:  .1MEDICALADVICE     Patient is calling for Medical Advice regarding: Patient is following up about a  call back form staff Patient states has been waiting for the call since yesterday.     How long has patient had these symptoms: N/A    Pharmacy name and phone#: n/a    Patient wants a call back or thru [facility], provide patient's call back phone number: Patient [phone]    Comments: Thank you     Please advise patient replies from provider may take up to 48 hours.  >> Jul 1, 2025 11:11 AM Med Assistant Holly wrote:  Pt requests call back.  ----- Message -----  From: Nikia Rose  Sent: 7/1/2025  10:46 AM CDT  To: Vidal Khna

## 2025-07-15 ENCOUNTER — OFFICE VISIT (OUTPATIENT)
Dept: CARDIOLOGY | Facility: CLINIC | Age: 70
End: 2025-07-15
Payer: MEDICARE

## 2025-07-15 VITALS
SYSTOLIC BLOOD PRESSURE: 146 MMHG | HEIGHT: 69 IN | BODY MASS INDEX: 29.26 KG/M2 | HEART RATE: 68 BPM | DIASTOLIC BLOOD PRESSURE: 90 MMHG | WEIGHT: 197.56 LBS

## 2025-07-15 DIAGNOSIS — I25.84 CORONARY ATHEROSCLEROSIS DUE TO CALCIFIED CORONARY LESION: Primary | ICD-10-CM

## 2025-07-15 DIAGNOSIS — E78.49 OTHER HYPERLIPIDEMIA: Chronic | ICD-10-CM

## 2025-07-15 DIAGNOSIS — I25.10 CORONARY ATHEROSCLEROSIS DUE TO CALCIFIED CORONARY LESION: Primary | ICD-10-CM

## 2025-07-15 PROCEDURE — 99213 OFFICE O/P EST LOW 20 MIN: CPT | Mod: PBBFAC,PO | Performed by: INTERNAL MEDICINE

## 2025-07-15 PROCEDURE — 99204 OFFICE O/P NEW MOD 45 MIN: CPT | Mod: S$PBB,,, | Performed by: INTERNAL MEDICINE

## 2025-07-15 PROCEDURE — 99999 PR PBB SHADOW E&M-EST. PATIENT-LVL III: CPT | Mod: PBBFAC,,, | Performed by: INTERNAL MEDICINE

## 2025-07-15 RX ORDER — ASPIRIN 81 MG/1
81 TABLET ORAL DAILY
COMMUNITY
Start: 2025-07-15 | End: 2026-07-15

## 2025-07-15 RX ORDER — ATORVASTATIN CALCIUM 40 MG/1
40 TABLET, FILM COATED ORAL DAILY
Qty: 90 TABLET | Refills: 3 | Status: SHIPPED | OUTPATIENT
Start: 2025-07-15

## 2025-07-15 NOTE — PROGRESS NOTES
Subjective:     Chief Complaint:  Anthony Hanna is a 69 y.o. male referred by Zhang Denise MD for evaluation of atherosclerosis of coronary artery.    Problem List and HPI:   Coronary calcification  Hypercholesterolemia - on a statin since 2017  Hypertension   Tobacco use  History of Present Illness    Mr. Hanna presents today for evaluation of coronary atherosclerosis noted on CT of the chest He reports dyspnea with exertion, specifically when pushing a lawnmower, cutting grass, and walking up stairs. He denies chest tightness, pressure, or heaviness associated with these episodes. His younger brother underwent CABG with multiple bypasses and is currently on cholesterol medication. This brother has a history of smoking and poor diet. Another younger brother was recently diagnosed with an aneurysm discovered incidentally on low-dose CT, requiring immediate medical intervention. He has been taking cholesterol medication since 2017, not at a high dose. His recent cholesterol levels have been in the 80s range, with the most recent reading at 90. He maintains a predominantly healthy diet influenced by his wife, consisting primarily of chicken and pork while avoiding red meat. The household diet is mostly meatless with occasional fish. He uses sugar-free products and minimizes salt intake, typically not adding salt to meals. Fried food consumption is very limited. He reports longstanding intermittent mild abdominal pain that occurs frequently but is not continuous. A previous gastroenterologist evaluation, including an US in 2019, found no serious underlying issues. Pain does not significantly impact his daily functioning.             Review of patient's allergies indicates:   Allergen Reactions    Pcn [penicillins] Anaphylaxis    Candesartan      Itching - drug rash        Current Outpatient Medications   Medication Sig    albuterol (PROVENTIL/VENTOLIN HFA) 90 mcg/actuation inhaler Inhale 1-2 puffs into the lungs  "every 6 (six) hours as needed for Shortness of Breath.    amLODIPine (NORVASC) 10 MG tablet Take 1 tablet (10 mg total) by mouth once daily.    citalopram (CELEXA) 40 MG tablet Take 1 tablet (40 mg total) by mouth once daily.    hydroCHLOROthiazide (HYDRODIURIL) 25 MG tablet Take 1 tablet (25 mg total) by mouth once daily.    aspirin (ECOTRIN) 81 MG EC tablet Take 1 tablet (81 mg total) by mouth once daily.    atorvastatin (LIPITOR) 40 MG tablet Take 1 tablet (40 mg total) by mouth once daily.     No current facility-administered medications for this visit.         Past Medical and Surgical history:  Anthony Hanna  has a past medical history of Ankle fracture, Cataract, Essential hypertension, HLD (hyperlipidemia), and Nuclear sclerosis - Both Eyes (04/16/2013).   Past Surgical History:   Procedure Laterality Date    BACK SURGERY      COLONOSCOPY N/A 12/11/2018    Procedure: COLONOSCOPY;  Surgeon: PUMA Fernandez MD;  Location: [facility] ([address] [place] [address]);  Service: Endoscopy;  Laterality: N/A;    FRACTURE SURGERY      right ankle      SPINE SURGERY         Social history:  Anthony Hanna  reports that he quit smoking about 4 years ago. His smoking use included cigarettes. He started smoking about 19 years ago. He has a 7.5 pack-year smoking history. He has never been exposed to tobacco smoke. He has never used smokeless tobacco. He reports current alcohol use. He reports that he does not use drugs.    Family history:  Anthony's family history includes Glaucoma in his mother; Heart disease in his maternal grandfather, maternal grandmother, and mother; Hyperlipidemia in his brother; Hypertension in his brother; No Known Problems in his father, maternal aunt, maternal uncle, paternal aunt, paternal grandfather, paternal grandmother, paternal uncle, and sister; Stroke in his mother.      Objective:   BP (!) 146/90   Pulse 68   Ht 5' 9" (1.753 m)   Wt 89.6 kg (197 lb 8.5 oz)   BMI 29.17 kg/m²    Physical " Exam  Constitutional:       Appearance: He is well-developed.      Comments:      HENT:      Head: Normocephalic and atraumatic.   Neck:      Vascular: No carotid bruit or JVD.   Cardiovascular:      Rate and Rhythm: Normal rate and regular rhythm.      Pulses:           Radial pulses are 2+ on the right side and 2+ on the left side.        Posterior tibial pulses are 1+ on the right side and 1+ on the left side.      Heart sounds: S1 normal and S2 normal. No murmur heard.     No gallop.   Pulmonary:      Effort: Pulmonary effort is normal.      Breath sounds: No wheezing or rales.   Chest:      Chest wall: There is no dullness to percussion.   Abdominal:      Palpations: Abdomen is soft. There is no hepatomegaly or splenomegaly.      Tenderness: There is no abdominal tenderness.   Musculoskeletal:      Right lower leg: No edema.      Left lower leg: No edema.   Skin:     General: Skin is warm and dry.      Findings: No bruising.      Nails: There is no clubbing.   Neurological:      Mental Status: He is alert and oriented to person, place, and time.      Gait: Gait normal.   Psychiatric:         Speech: Speech normal.         Behavior: Behavior normal.         Thought Content: Thought content normal.         Judgment: Judgment normal.             Labs      No results found for this or any previous visit.       No results found for this or any previous visit.      Results for orders placed or performed during the hospital encounter of 06/23/25 (from the past 2160 hours)   CT Chest Lung Screening Low Dose    Narrative    EXAMINATION:  CT CHEST LUNG SCREENING LOW DOSE    CLINICAL HISTORY:  Lung cancer screening, >= 20 pk-yr smoking history, risk factor(s) (Age >= 50y);    TECHNIQUE:  CT scan of the chest without intravenous contrast, using low-dose lung cancer screening protocol.    COMPARISON:  CT chest lung screening scan dated 07/01/2024.    FINDINGS:  Lines and tubes: None    Lungs and airways: No significant  emphysema.  Bandlike densities in the lingula and lung bases likely representing subsegmental atelectasis versus scarring, increased in conspicuity compared prior study.  No discrete nodules noted.    Pleural: The pleural spaces are clear    Base of neck, mediastinum and heart: The thyroid gland is normal. No mediastinal, hilar or axillary lymphadenopathy is seen. The heart has normal size.The pericardium is normal.  Mild atheromatous disease is seen in the aorta. The coronary arteries show moderate multivessel calcified atheromatous disease.    Soft tissues: Unremarkable    Abdomen: The study was performed without contrast and with lower than standard dose. These factors reduce the sensitivity of small lesions in the upper abdomen.  Given the significant limitations, no focal lesions is seen within the visualized upper abdomen.    Bones: Cervical spondylosis.    Explanation of the Lung-RADS categories can be found at: [email or web address]      Impression    Lung-RADS Category: 1 - Negative - Continue annual screening with LDCT in 12 months.    Clinically or potentially clinically significant non lung cancer finding: None.    Prior Lung Cancer Modifier:  No history of prior lung cancer.      Electronically signed by: Tulio Gimenez  Date:    06/27/2025  Time:    09:49           Assessment and Plan:       ICD-10-CM ICD-9-CM   1. Coronary atherosclerosis due to calcified coronary lesion  I25.10 414.00    I25.84 414.4   2. Hyperlipidemia  E78.49 272.4                Assessment & Plan    I25.10, I25.84 Coronary atherosclerosis due to calcified coronary lesion  E78.49 Other hyperlipidemia    PLAN SUMMARY:  - Ordered CMP in 3 months  - Increased atorvastatin from 20 mg to 40 mg daily  - Started aspirin 81 mg daily  - Ordered abdominal US to screen for abdominal aortic aneurysm  - Recommend dietary changes: use olive oil or avocado oil butter, reduce saturated  fat intake, limit eggs to 5-6 per week  - Mr. Maldonado to improve diet and exercise habits  - Ordered lipid profile in 3 months  - No regular cardiology follow-up needed    CORONARY ATHEROSCLEROSIS DUE TO CALCIFIED CORONARY LESION:  - Coronary atherosclerosis noted on chest CT.  - Stress echocardiogram results showed no evidence of ischemia and normal EF.  - Explained calcium score >300 indicates aspirin therapy.  - Explained relationship between calcium scores, age, and cardiovascular risk.  - Started aspirin 81 mg daily.  - No need for regular cardiology follow-up.  - Recommend repeating abdominal US due to family history of abdominal aortic aneurysm and smoking history.  - Ordered abdominal US to screen for abdominal aortic aneurysm.  - Ordered CMP (CMP) in 3 months.    OTHER HYPERLIPIDEMIA:  - Discussed options to lower LDL to <70: increase atorvastatin, add ezetimibe, or focus on diet/exercise.  - Increased atorvastatin from 20 mg to 40 mg daily.  - Ordered lipid profile in 3 months.  - Discussed factors affecting cholesterol levels, including liver production (70%) and dietary intake (30%).  - Clarified impact of various foods on cholesterol and saturated fat intake.  - Explained differences between cholesterol and saturated fat content in foods.  - Mr. Maldonado to work on improving diet and exercise habits to help lower cholesterol levels.  - Advised limiting egg consumption to no more than 5-6 eggs per week.  - Recommend using butter made from olive oil or avocado oil for cooking.  - Suggested reducing consumption of foods high in saturated fat.  - Ordered CMP (CMP) in 3 months.           Orders entered during this encounter:    Coronary atherosclerosis due to calcified coronary lesion  -     Ambulatory referral/consult to Cardiology  -     aspirin (ECOTRIN) 81 MG EC tablet; Take 1 tablet (81 mg total) by mouth once daily.    Hyperlipidemia  -     Lipid Panel; Future; Expected date: 10/15/2025  -      Comprehensive Metabolic Panel; Future; Expected date: 10/15/2025    Other orders  -     atorvastatin (LIPITOR) 40 MG tablet; Take 1 tablet (40 mg total) by mouth once daily.  Dispense: 90 tablet; Refill: 3         Follow up if symptoms worsen or fail to improve.      This note was generated with the assistance of ambient listening technology. Verbal consent was obtained by the patient and accompanying visitor(s) for the recording of patient appointment to facilitate this note. I attest to having reviewed and edited the generated note for accuracy, though some syntax or spelling errors may persist. Please contact the author of this note for any clarification.

## 2025-07-17 ENCOUNTER — TELEPHONE (OUTPATIENT)
Dept: CARDIOLOGY | Facility: CLINIC | Age: 70
End: 2025-07-17
Payer: MEDICARE

## 2025-07-17 NOTE — TELEPHONE ENCOUNTER
Called pt to schedule labs to be done mid October as ordered recently by . Pt states he is not ready to schedule lab appointment at this time. Pt states he will schedule his labs at a later date.

## (undated) DEVICE — PAD CAST SPECIALIST STRL 6

## (undated) DEVICE — ELECTRODE REM PLYHSV RETURN 9

## (undated) DEVICE — APPLICATOR CHLORAPREP ORN 26ML

## (undated) DEVICE — SEE MEDLINE ITEM 152515

## (undated) DEVICE — BANDAGE ACE ELASTIC 6"

## (undated) DEVICE — WIRE-K 20MM X 150MM.
Type: IMPLANTABLE DEVICE | Site: ANKLE | Status: NON-FUNCTIONAL
Removed: 2017-09-13

## (undated) DEVICE — DRESSING N ADH OIL EMUL 3X8

## (undated) DEVICE — BANDAGE ESMARK 6X12

## (undated) DEVICE — SEE MEDLINE ITEM 146298

## (undated) DEVICE — TRAY MINOR ORTHO

## (undated) DEVICE — DRAPE STERI U-SHAPED 47X51IN

## (undated) DEVICE — SEE MEDLINE ITEM 152764

## (undated) DEVICE — SEE MEDLINE ITEM 152622

## (undated) DEVICE — GAUZE SPONGE 4X4 12PLY

## (undated) DEVICE — SUT ETHILON 3/0 18IN PS-1

## (undated) DEVICE — DRESSING N ADH OIL EMUL 3X16

## (undated) DEVICE — SEE MEDLINE ITEM 152529

## (undated) DEVICE — DRAPE C-ARMOR EQUIPMENT COVER

## (undated) DEVICE — CATH SUCTION 10FR

## (undated) DEVICE — BIT BRILL 2.5

## (undated) DEVICE — PAD CAST SPECIALIST STRL 4

## (undated) DEVICE — DRAPE PLASTIC U 60X72

## (undated) DEVICE — SEE MEDLINE ITEM 157150

## (undated) DEVICE — SUT 3-0 VICRYL SH CR/8 18

## (undated) DEVICE — SUT VICRYL PLUS 0 CT1 18IN

## (undated) DEVICE — BLADE SURG CARBON STEEL #10

## (undated) DEVICE — TOURNIQUET SB QC DP 34X4IN

## (undated) DEVICE — SEE MEDLINE ITEM 146271

## (undated) DEVICE — DRAPE C ARM 42 X 120 10/BX